# Patient Record
Sex: MALE | Race: ASIAN | NOT HISPANIC OR LATINO | Employment: FULL TIME | URBAN - METROPOLITAN AREA
[De-identification: names, ages, dates, MRNs, and addresses within clinical notes are randomized per-mention and may not be internally consistent; named-entity substitution may affect disease eponyms.]

---

## 2017-01-16 RX ORDER — BETAMETHASONE DIPROPIONATE 0.5 MG/G
CREAM TOPICAL
Qty: 1 TUBE | Refills: 0 | Status: SHIPPED | OUTPATIENT
Start: 2017-01-16 | End: 2017-10-19 | Stop reason: SDUPTHER

## 2017-01-16 NOTE — TELEPHONE ENCOUNTER
Was the patient seen in the last year in this department? Yes  Seen 11/21/16    Does patient have an active prescription for medications requested? No     Received Request Via: Pharmacy

## 2017-01-30 DIAGNOSIS — G47.09 OTHER INSOMNIA: ICD-10-CM

## 2017-01-31 RX ORDER — ZOLPIDEM TARTRATE 10 MG/1
10 TABLET ORAL NIGHTLY PRN
Qty: 30 EACH | Refills: 0 | Status: SHIPPED
Start: 2017-01-31 | End: 2017-03-27 | Stop reason: SDUPTHER

## 2017-01-31 NOTE — TELEPHONE ENCOUNTER
From: Don Olivas Jr.  To: Rebekah Calix M.D.  Sent: 1/30/2017 8:54 PM PST  Subject: Medication Renewal Request    Original authorizing provider: CLYDE Dow Jr. would like a refill of the following medications:  zolpidem (AMBIEN) 10 MG Tab [Rebekah Calix M.D.]    Preferred pharmacy: [facility] [id number] [facility], [place] - [address]    Comment:

## 2017-03-27 DIAGNOSIS — G47.09 OTHER INSOMNIA: ICD-10-CM

## 2017-03-27 RX ORDER — ZOLPIDEM TARTRATE 10 MG/1
10 TABLET ORAL NIGHTLY PRN
Qty: 30 EACH | Refills: 0 | Status: SHIPPED
Start: 2017-03-27 | End: 2017-05-09 | Stop reason: SDUPTHER

## 2017-03-27 NOTE — TELEPHONE ENCOUNTER
From: Don Olivas Jr.  To: Rebekah Calix M.D.  Sent: 3/27/2017 1:12 PM PDT  Subject: Medication Renewal Request    Original authorizing provider: CLYDE Dow Jr. would like a refill of the following medications:  zolpidem (AMBIEN) 10 MG Tab [Rebekah Calix M.D.]    Preferred pharmacy: [facility] [id number] [facility], [place] - [address]    Comment:

## 2017-04-03 ENCOUNTER — OFFICE VISIT (OUTPATIENT)
Dept: ENDOCRINOLOGY | Facility: MEDICAL CENTER | Age: 52
End: 2017-04-03
Payer: COMMERCIAL

## 2017-04-03 VITALS
BODY MASS INDEX: 27.65 KG/M2 | OXYGEN SATURATION: 96 % | SYSTOLIC BLOOD PRESSURE: 140 MMHG | HEIGHT: 67 IN | WEIGHT: 176.2 LBS | HEART RATE: 96 BPM | DIASTOLIC BLOOD PRESSURE: 90 MMHG

## 2017-04-03 DIAGNOSIS — C73 PAPILLARY THYROID CARCINOMA (HCC): ICD-10-CM

## 2017-04-03 DIAGNOSIS — E89.0 POSTSURGICAL HYPOTHYROIDISM: ICD-10-CM

## 2017-04-03 PROCEDURE — 99214 OFFICE O/P EST MOD 30 MIN: CPT | Performed by: INTERNAL MEDICINE

## 2017-04-03 NOTE — MR AVS SNAPSHOT
"Don Olivas Jr.   4/3/2017 12:20 PM   Office Visit   MRN: [id number]    Department:  Endocrinology [facility]   Dept Phone:  [phone]    Description:  Male : 1965   Provider:  Henry Hernández M.D.           Reason for Visit     Follow-Up Papillary Thyroid Carcinoma      Allergies as of 4/3/2017     Allergen Noted Reactions    Lisinopril 2015       Cough      You were diagnosed with     Papillary thyroid carcinoma (CMS-HCC)   [120477]         Vital Signs     Blood Pressure Pulse Height Weight Body Mass Index Oxygen Saturation    140/90 mmHg 96 1.689 m (5' 6.5\") 79.924 kg (176 lb 3.2 oz) 28.02 kg/m2 96%    Smoking Status                   Current Every Day Smoker           Basic Information     Date Of Birth Sex Race Ethnicity Preferred Language    1965 Male  Non- English      Your appointments     May 22, 2017 10:20 AM   Established Patient with Rebekah Calix M.D.   [facility])    [address]  [address]  [facility] [address] [phone]           You will be receiving a confirmation call a few days before your appointment from our automated call confirmation system.            Oct 02, 2017 10:40 AM   Established Patient with Henry Hernández M.D.   [facility]    [address], [address]  [facility] [address] [phone]           You will be receiving a confirmation call a few days before your appointment from our automated call confirmation system.              Problem List              ICD-10-CM Priority Class Noted - Resolved    Hypothyroidism E03.9   Unknown - Present    Insomnia G47.00   Unknown - Present    Papillary carcinoma of thyroid (CMS-HCC) C73   Unknown - Present    Postsurgical hypothyroidism E89.0   10/24/2012 - Present    ASTHMA    2013 - Present    HTN (hypertension) I10   Unknown - Present    Parotid gland enlargement K11.1   2015 - Present    Essential " hypertension I10   7/13/2015 - Present    Vitamin D deficiency E55.9   10/29/2015 - Present      Health Maintenance        Date Due Completion Dates    IMM DTaP/Tdap/Td Vaccine (1 - Tdap) 11/14/1983 ---    IMM PNEUMOCOCCAL 19-64 (ADULT) MEDIUM RISK SERIES (1 of 1 - PPSV23) 11/14/1983 ---    COLONOSCOPY 11/14/2014 ---            Current Immunizations     Influenza TIV (IM) 10/3/2012, 9/25/2012    Influenza Vaccine Quad Inj (Pf) 11/21/2016, 10/22/2014    Influenza Vaccine Quad Inj (Preserved) 10/29/2015    Tuberculin Skin Test 1/4/2013  4:05 PM, 1/25/2012 12:34 PM, 1/18/2012  4:20 PM      Below and/or attached are the medications your provider expects you to take. Review all of your home medications and newly ordered medications with your provider and/or pharmacist. Follow medication instructions as directed by your provider and/or pharmacist. Please keep your medication list with you and share with your provider. Update the information when medications are discontinued, doses are changed, or new medications (including over-the-counter products) are added; and carry medication information at all times in the event of emergency situations     Allergies:  LISINOPRIL - (reactions not documented)               Medications  Valid as of: April 03, 2017 - 12:22 PM    Generic Name Brand Name Tablet Size Instructions for use    Albuterol Sulfate (Aero Soln) albuterol 108 (90 BASE) MCG/ACT Inhale 2 Puffs by mouth every 6 hours as needed for Shortness of Breath.        Betamethasone Dipropionate (Cream) DIPROLENE 0.05 % Apply to affected areas twice daily.        Betamethasone Dipropionate Aug (Cream) DIPROLENE-AF 0.05 % APPLY TO AFFECTED AREAS TWICE DAILY.        Calcium Carb-Cholecalciferol   Take  by mouth 2 Times a Day.        Calcium Carbonate Antacid   Take 1,000 mg by mouth 2 Times a Day.        Levothyroxine Sodium (Tab) SYNTHROID 100 MCG Take 1 Tab by mouth every day.        Lisinopril (Tab) PRINIVIL 10 MG Take 1 Tab by  mouth every day.        Losartan Potassium (Tab) COZAAR 25 MG Take 1 Tab by mouth every day.        Meloxicam (Tab) MOBIC 15 MG TAKE 1 TABLET BY MOUTH EVERY DAY        Zolpidem Tartrate (Tab) AMBIEN 10 MG Take 1 Tab by mouth at bedtime as needed for Sleep.        .                 Medicines prescribed today were sent to:     [facility] [id number] - [place], [place] - [address]    [address] [place] [address]    Phone: [phone] Fax: [phone]    Open 24 Hours?: No      Medication refill instructions:       If your prescription bottle indicates you have medication refills left, it is not necessary to call your provider’s office. Please contact your pharmacy and they will refill your medication.    If your prescription bottle indicates you do not have any refills left, you may request refills at any time through one of the following ways: The online [employer] system (except Urgent Care), by calling your provider’s office, or by asking your pharmacy to contact your provider’s office with a refill request. Medication refills are processed only during regular business hours and may not be available until the next business day. Your provider may request additional information or to have a follow-up visit with you prior to refilling your medication.   *Please Note: Medication refills are assigned a new Rx number when refilled electronically. Your pharmacy may indicate that no refills were authorized even though a new prescription for the same medication is available at the pharmacy. Please request the medicine by name with the pharmacy before contacting your provider for a refill.        Your To Do List     Future Labs/Procedures Complete By Expires    ANTITHYROGLOBULIN AB  As directed 4/3/2018    ANTITHYROGLOBULIN AB  As directed 4/3/2018    FREE THYROXINE  As directed 4/3/2018    FREE THYROXINE  As directed 4/3/2018    THYROGLOBULIN, QT  As directed 4/3/2018    THYROGLOBULIN, QT  As directed 4/3/2018       TSH  As directed 4/3/2018    TSH  As directed 4/3/2018    US-SOFT TISSUES OF HEAD - NECK  As directed 4/3/2018         MyChart Access Code: Activation code not generated  Current MyChart Status: Active          Quit Tobacco Information     Do you want to quit using tobacco?    Quitting tobacco decreases risks of cancer, heart and lung disease, increases life expectancy, improves sense of taste and smell, and increases spending money, among other benefits.    If you are thinking about quitting, we can help.  • [facility] Quit Tobacco Program: [phone]  o Program occurs weekly for four weeks and includes pharmacist consultation on products to support quitting smoking or chewing tobacco. A provider referral is needed for pharmacist consultation.  • Tobacco Users Help Hotline: 3-939-QUITNOW (669-1270) or https://[place].quitlogix.org/  o Free, confidential telephone and online coaching for [place] residents. Sessions are designed on a schedule that is convenient for you. Eligible clients receive free nicotine replacement therapy.  • Nationally: www.smokefree.gov  o Information and professional assistance to support both immediate and long-term needs as you become, and remain, a non-smoker. Smokefree.gov allows you to choose the help that best fits your needs.

## 2017-04-03 NOTE — PROGRESS NOTES
Endocrinology Clinic Progress Note  PCP: Rebekah Calix M.D.    CC: Papillary thyroid carcinoma    HPI:  Don Olivas Jr. is a 51 y.o. old patient who comes in today for routine follow up. Patient is new to me today, previously saw Dr. Peguero. In 2007 he underwent total thyroidectomy, pathology report reviewed. He had 2.0 cm focus of papillary thyroid cancer in right thyroid lobe, no extrathyroidal extension, surgical margin uninvolved. He also had a 0.3 cm focus of papillary thyroid carcinoma in the left thyroid lobe, without extrathyroidal extension. He did not receive radioactive iodine remnant ablation. Thyroid bed ultrasound in May 2015 showed 22 mm mass in the right thyroid bed with internal vascular flow. It also showed possible remnant thyroid tissue measuring 12 mm in the left thyroid lobe. He also had 22 mm cystic mass in the left parotid gland. FNA of left parotid mass in July 2015 was benign. He denies noticing any neck lumps or masses. He is currently on levothyroxine 100 µg daily. He reports compliance with medications. No family history of thyroid cancer.    ROS:  Constitutional: No unintentional weight loss  Endo: Denies excessive thirst or frequent urination    Past Medical History:  Patient Active Problem List    Diagnosis Date Noted   • Vitamin D deficiency 10/29/2015   • Essential hypertension 07/13/2015   • Parotid gland enlargement 06/24/2015   • HTN (hypertension)    • ASTHMA 01/09/2013   • Postsurgical hypothyroidism 10/24/2012   • Hypothyroidism    • Insomnia    • Papillary carcinoma of thyroid (CMS-HCC)        Medications:    Current outpatient prescriptions:   •  zolpidem (AMBIEN) 10 MG Tab, Take 1 Tab by mouth at bedtime as needed for Sleep., Disp: 30 Each, Rfl: 0  •  levothyroxine (SYNTHROID) 100 MCG Tab, Take 1 Tab by mouth every day., Disp: 30 Tab, Rfl: 5  •  losartan (COZAAR) 25 MG Tab, Take 1 Tab by mouth every day., Disp: 30 Tab, Rfl: 5  •  meloxicam (MOBIC) 15 MG  tablet, TAKE 1 TABLET BY MOUTH EVERY DAY, Disp: 30 Tab, Rfl: 1  •  Calcium Carbonate Antacid (TUMS PO), Take 1,000 mg by mouth 2 Times a Day., Disp: , Rfl:   •  Calcium Carb-Cholecalciferol (CALCIUM 600 + D PO), Take  by mouth 2 Times a Day., Disp: , Rfl:   •  Aug Betamethasone Dipropionate (DIPROLENE-AF) 0.05 % Cream, APPLY TO AFFECTED AREAS TWICE DAILY., Disp: 1 Tube, Rfl: 0  •  betamethasone dipropionate (DIPROLENE) 0.05 % Cream, Apply to affected areas twice daily., Disp: 30 g, Rfl: 1  •  lisinopril (PRINIVIL) 10 MG TABS, Take 1 Tab by mouth every day., Disp: 30 Tab, Rfl: 5  •  albuterol (VENTOLIN OR PROVENTIL) 108 (90 BASE) MCG/ACT AERS, Inhale 2 Puffs by mouth every 6 hours as needed for Shortness of Breath., Disp: 8.5 g, Rfl: 3    Labs:   [facility]  [facility] [id number][facility] as of 4/3/2017 12:29   Ref. Range 11/19/2016 09:34   TSH Latest Ref Range: 0.300-3.700 uIU/mL 0.300   Free T-4 Latest Ref Range: 0.53-1.43 ng/dL 0.97   T3,Free Latest Ref Range: 2.40-4.20 pg/mL 3.47   Thyroglobulin by LC-MC/MS-S/P Latest Ref Range: 1.3-31.8 ng/mL Not Applicable   Thyroglobulin Latest Ref Range: 1.3-31.8 ng/mL 2.0   Anti-Thyroglobulin Latest Ref Range: 0.0-4.0 IU/mL <0.9     Pathology report from 2007:  FINAL DIAGNOSIS:    A.  Right thyroid lobe:  Carcinoma having the following characteristics:    -Histologic type:       Papillary carcinoma.    -Extent of invasion:  The tumor measures 2 cm in greatest dimension and there is no evidence  of extra-thyroidal extension.    -Margin status:       The surgical margin is uninvolved by carcinoma.  B.  Left thyroid lobe:  Carcinoma having the following characteristics:    -Histologic type:       Papillary carcinoma.    -Extent of invasion:  The tumor measures 0.3 cm in greatest dimension and there is no evidence  of extra-thyroidal extension.    -Margin status:       The surgical margin is uninvolved by carcinoma.    5/4/2015 12:04 PM    HISTORY/REASON FOR EXAM:  " Left parotid gland mass, post 2007 thyroidectomy for papillary carcinoma    TECHNIQUE/EXAM DESCRIPTION:  Ultrasound of the soft tissues of the head and neck.    COMPARISON:  5/22/07    FINDINGS:  The thyroid gland is surgically absent by report.    There is intermediate echogenicity material in the right thyroid bed measuring 12 x 22 x 13 mm. In the center of this there is an indistinct hypoechoic region measuring 8 x 8 mm. There is internal vascular flow. No calcification    In the left thyroid bed there is what also appears to represent some thyroid gland measuring 12 x 7 x 6 mm    In the region of the left parotid gland/tail there is a cystic mass measuring 22 x 20 x 11 mm. Some internal echoes are seen and calcification along the margins is not excluded. The 2007 comparison demonstrated a 15 x 13 x 12 mm similar cystic mass in   this region         Impression        Solid tissue in the thyroid bed is suspicious for residual thyroid gland. Recommend clinical correlation and if indicated iodine-123 study could be performed    Suspicious for a solid 8mm nodule within the right thyroid lobe    Left parotid tail mass has enlarged measuring 22 from 15 mm previously. Reportedly this was previously biopsied. Recommend clinical correlation.         Physical Examination:  Vital signs: /90 mmHg  Pulse 96  Ht 1.689 m (5' 6.5\")  Wt 79.924 kg (176 lb 3.2 oz)  BMI 28.02 kg/m2  SpO2 96%  General: No apparent distress, cooperative  Eyes: No scleral icterus, no discharge, normal eyelids  Neck: No abnormal masses on palpation  Resp: Normal effort, clear to auscultation bilaterally  CVS: Regular rate and rhythm, S1 S2 normal, no murmur  Extremities: No lower extremity edema  Musculoskeletal: Normal digits and nails  Skin: No rash on visible skin  Psych: Alert and oriented, normal mood and affect    Assessment and Plan:    1. Papillary thyroid carcinoma (CMS-HCC)  · Status post total thyroidectomy in 2007. Pathology " showed  2.0 cm focus of papillary thyroid cancer in right thyroid lobe, no extrathyroidal extension, surgical margin uninvolved. He also had a 0.3 cm focus of papillary thyroid carcinoma in the left thyroid lobe, without extrathyroidal extension.   · He did not receive radioactive iodine remnant ablation.   · Thyroid bed ultrasound in May 2015 showed 22 mm mass in the right thyroid bed with internal vascular flow. It also showed possible remnant thyroid tissue measuring 12 mm in the left thyroid lobe. He also had 22 mm cystic mass in the left parotid gland. FNA of left parotid mass in July 2015 was benign.  · Thyroglobulin level in November 2016 was 2.0, compared to 3.1 in 2015, thyroglobulin antibody is negative  · We discussed that at this time he meets criteria for indeterminate response versus structural incomplete response  · We will repeat thyroid bed ultrasound now  · We will repeat thyroglobulin panel now and then again in 6 months  - FREE THYROXINE; Future  - TSH; Future  - THYROGLOBULIN, QT; Future  - ANTITHYROGLOBULIN AB; Future  - US-SOFT TISSUES OF HEAD - NECK; Future  - TSH; Future  - FREE THYROXINE; Future  - THYROGLOBULIN, QT; Future  - ANTITHYROGLOBULIN AB; Future    2. Postsurgical hypothyroidism  · Goal TSH at this time in the range of 0.1-0.5  · Most recent TSH 0.3  · Continue levothyroxine 100 µg daily  · Repeat labs for TSH and free T4 now    Return in about 6 months (around 10/3/2017).    Thank you for allowing me to participate in the care of this patient.    Henry Hernández M.D.    CC:   Rebekah Calix M.D.    This note was created using voice recognition software (Dragon). The accuracy of the dictation is limited by the abilities of the software. I have reviewed the note prior to signing, however some errors in grammar and context are still possible. If you have any questions related to this note please do not hesitate to contact our office.

## 2017-04-11 ENCOUNTER — HOSPITAL ENCOUNTER (OUTPATIENT)
Dept: LAB | Facility: MEDICAL CENTER | Age: 52
End: 2017-04-11
Attending: INTERNAL MEDICINE
Payer: COMMERCIAL

## 2017-04-11 DIAGNOSIS — C73 PAPILLARY THYROID CARCINOMA (HCC): ICD-10-CM

## 2017-04-11 LAB
T4 FREE SERPL-MCNC: 1.19 NG/DL (ref 0.53–1.43)
TSH SERPL DL<=0.005 MIU/L-ACNC: 0.08 UIU/ML (ref 0.3–3.7)

## 2017-04-11 PROCEDURE — 36415 COLL VENOUS BLD VENIPUNCTURE: CPT

## 2017-04-11 PROCEDURE — 84443 ASSAY THYROID STIM HORMONE: CPT

## 2017-04-11 PROCEDURE — 86800 THYROGLOBULIN ANTIBODY: CPT

## 2017-04-11 PROCEDURE — 84432 ASSAY OF THYROGLOBULIN: CPT | Mod: 91

## 2017-04-11 PROCEDURE — 84439 ASSAY OF FREE THYROXINE: CPT

## 2017-04-13 LAB
THYROGLOB AB SERPL-ACNC: <0.9 IU/ML (ref 0–4)
THYROGLOB SERPL-MCNC: 2 NG/ML (ref 1.3–31.8)
THYROGLOB SERPL-MCNC: NORMAL NG/ML (ref 1.3–31.8)

## 2017-04-18 ENCOUNTER — HOSPITAL ENCOUNTER (OUTPATIENT)
Dept: RADIOLOGY | Facility: MEDICAL CENTER | Age: 52
End: 2017-04-18
Attending: INTERNAL MEDICINE
Payer: COMMERCIAL

## 2017-04-18 DIAGNOSIS — E89.0 POSTSURGICAL HYPOTHYROIDISM: ICD-10-CM

## 2017-04-18 DIAGNOSIS — C73 PAPILLARY THYROID CARCINOMA (HCC): ICD-10-CM

## 2017-04-18 DIAGNOSIS — I10 ESSENTIAL HYPERTENSION: ICD-10-CM

## 2017-04-18 PROCEDURE — 76536 US EXAM OF HEAD AND NECK: CPT

## 2017-04-18 RX ORDER — LEVOTHYROXINE SODIUM 0.1 MG/1
100 TABLET ORAL
Qty: 30 TAB | Refills: 0 | OUTPATIENT
Start: 2017-04-18

## 2017-04-18 RX ORDER — LEVOTHYROXINE SODIUM 0.1 MG/1
100 TABLET ORAL
Qty: 30 TAB | Refills: 5 | Status: SHIPPED | OUTPATIENT
Start: 2017-04-18 | End: 2017-07-24 | Stop reason: SDUPTHER

## 2017-04-18 RX ORDER — LOSARTAN POTASSIUM 25 MG/1
25 TABLET ORAL
Qty: 30 TAB | Refills: 5 | Status: SHIPPED | OUTPATIENT
Start: 2017-04-18 | End: 2017-07-24 | Stop reason: SDUPTHER

## 2017-04-18 RX ORDER — LOSARTAN POTASSIUM 25 MG/1
25 TABLET ORAL
Qty: 30 TAB | Refills: 0 | OUTPATIENT
Start: 2017-04-18

## 2017-04-21 ENCOUNTER — TELEPHONE (OUTPATIENT)
Dept: ENDOCRINOLOGY | Facility: MEDICAL CENTER | Age: 52
End: 2017-04-21

## 2017-04-21 NOTE — TELEPHONE ENCOUNTER
Called Pt and notified  Thyroid bed ultrasound shows a questionable area measuring 1.2 cm. Please schedule an appointment in the clinic to follow-up on this finding, within the next month.    Thank you  Lilibeth

## 2017-05-08 DIAGNOSIS — G47.09 OTHER INSOMNIA: ICD-10-CM

## 2017-05-09 RX ORDER — ZOLPIDEM TARTRATE 10 MG/1
10 TABLET ORAL NIGHTLY PRN
Qty: 30 EACH | Refills: 0 | Status: CANCELLED
Start: 2017-05-09

## 2017-05-09 RX ORDER — ZOLPIDEM TARTRATE 10 MG/1
10 TABLET ORAL NIGHTLY PRN
Qty: 30 EACH | Refills: 0 | Status: SHIPPED | OUTPATIENT
Start: 2017-05-09 | End: 2017-07-05 | Stop reason: SDUPTHER

## 2017-05-09 NOTE — TELEPHONE ENCOUNTER
Was the patient seen in the last year in this department? Yes    Last appt 11/21/17, pending appt 5/22/17    Does patient have an active prescription for medications requested? No   Last filled 3/27/17    Received Request Via: Patient

## 2017-05-09 NOTE — TELEPHONE ENCOUNTER
From: Don Olivas Jr.  To: Sussy Tan M.D.  Sent: 5/8/2017 8:35 PM PDT  Subject: Medication Renewal Request    Original authorizing provider: CLYDE Alejo Jr. would like a refill of the following medications:  zolpidem (AMBIEN) 10 MG Tab [Sussy Tan M.D.]    Preferred pharmacy: [facility] [place] - [address]    Comment:

## 2017-05-11 NOTE — TELEPHONE ENCOUNTER
I have refilled this on 5/9/17. Please check with pharmacy if they got the fax if not okay to call in prescription.

## 2017-05-12 DIAGNOSIS — M54.5 ACUTE LOW BACK PAIN, UNSPECIFIED BACK PAIN LATERALITY, WITH SCIATICA PRESENCE UNSPECIFIED: ICD-10-CM

## 2017-05-12 RX ORDER — MELOXICAM 15 MG/1
15 TABLET ORAL
Qty: 30 TAB | Refills: 0 | OUTPATIENT
Start: 2017-05-12

## 2017-05-15 ENCOUNTER — OFFICE VISIT (OUTPATIENT)
Dept: ENDOCRINOLOGY | Facility: MEDICAL CENTER | Age: 52
End: 2017-05-15
Payer: COMMERCIAL

## 2017-05-15 VITALS
HEIGHT: 66 IN | OXYGEN SATURATION: 98 % | DIASTOLIC BLOOD PRESSURE: 78 MMHG | WEIGHT: 172.4 LBS | BODY MASS INDEX: 27.71 KG/M2 | SYSTOLIC BLOOD PRESSURE: 128 MMHG | HEART RATE: 85 BPM

## 2017-05-15 DIAGNOSIS — C73 PAPILLARY THYROID CARCINOMA (HCC): ICD-10-CM

## 2017-05-15 DIAGNOSIS — E89.0 POSTSURGICAL HYPOTHYROIDISM: ICD-10-CM

## 2017-05-15 PROCEDURE — 99213 OFFICE O/P EST LOW 20 MIN: CPT | Performed by: INTERNAL MEDICINE

## 2017-05-15 NOTE — PROGRESS NOTES
"Endocrinology Clinic Progress Note  PCP: Rebekah Calix M.D.    CC: Here to discuss thyroid ultrasound report    HPI:  Don Olivas Jr. is a 51 y.o. old patient who comes in today for follow up. Thyroid bed ultrasound in April 2017 showed 1.2 cm residual left thyroid tissue and 1.6 cm residual right thyroid. He underwent total thyroidectomy in 2007, details below. His most recent thyroglobulin level is 2.0, with undetectable thyroglobulin antibody level.    Copy of HPI from last clinic visit note: \"In 2007 he underwent total thyroidectomy, pathology report reviewed. He had 2.0 cm focus of papillary thyroid cancer in right thyroid lobe, no extrathyroidal extension, surgical margin uninvolved. He also had a 0.3 cm focus of papillary thyroid carcinoma in the left thyroid lobe, without extrathyroidal extension. He did not receive radioactive iodine remnant ablation. Thyroid bed ultrasound in May 2015 showed 22 mm mass in the right thyroid bed with internal vascular flow. It also showed possible remnant thyroid tissue measuring 12 mm in the left thyroid lobe. He also had 22 mm cystic mass in the left parotid gland. FNA of left parotid mass in July 2015 was benign. He denies noticing any neck lumps or masses. He is currently on levothyroxine 100 µg daily. He reports compliance with medications. No family history of thyroid cancer.\"    ROS:  Constitutional: No unintentional weight loss    Past Medical History:  Patient Active Problem List    Diagnosis Date Noted   • Vitamin D deficiency 10/29/2015   • Essential hypertension 07/13/2015   • Parotid gland enlargement 06/24/2015   • HTN (hypertension)    • ASTHMA 01/09/2013   • Postsurgical hypothyroidism 10/24/2012   • Hypothyroidism    • Insomnia    • Papillary carcinoma of thyroid (CMS-HCC)        Medications:    Current outpatient prescriptions:   •  meloxicam (MOBIC) 15 MG tablet, TAKE 1 TABLET BY MOUTH EVERY DAY, Disp: 30 Tab, Rfl: 2  •  zolpidem (AMBIEN) 10 MG " Tab, Take 1 Tab by mouth at bedtime as needed for Sleep., Disp: 30 Each, Rfl: 0  •  levothyroxine (SYNTHROID) 100 MCG Tab, Take 1 Tab by mouth every day., Disp: 30 Tab, Rfl: 5  •  losartan (COZAAR) 25 MG Tab, Take 1 Tab by mouth every day., Disp: 30 Tab, Rfl: 5  •  Aug Betamethasone Dipropionate (DIPROLENE-AF) 0.05 % Cream, APPLY TO AFFECTED AREAS TWICE DAILY., Disp: 1 Tube, Rfl: 0  •  betamethasone dipropionate (DIPROLENE) 0.05 % Cream, Apply to affected areas twice daily., Disp: 30 g, Rfl: 1  •  Calcium Carbonate Antacid (TUMS PO), Take 1,000 mg by mouth 2 Times a Day., Disp: , Rfl:   •  Calcium Carb-Cholecalciferol (CALCIUM 600 + D PO), Take  by mouth 2 Times a Day., Disp: , Rfl:   •  lisinopril (PRINIVIL) 10 MG TABS, Take 1 Tab by mouth every day., Disp: 30 Tab, Rfl: 5  •  albuterol (VENTOLIN OR PROVENTIL) 108 (90 BASE) MCG/ACT AERS, Inhale 2 Puffs by mouth every 6 hours as needed for Shortness of Breath., Disp: 8.5 g, Rfl: 3    Labs:  Results for [facility], ALBA EVI IRBY (MRN [id number]) as of 5/15/2017 10:47   Ref. Range 2015 07:54 2015 07:47 2015 08:13 2016 06:09 2016 09:34 2017 15:24   TSH Latest Ref Range: 0.300-3.700 uIU/mL 1.230 0.130 (L)   0.300 0.080 (L)   Free T-4 Latest Ref Range: 0.53-1.43 ng/dL 0.86 0.84   0.97 1.19   T3 Latest Ref Range: 60.0-181.0 ng/dL 97.5 91.5       T3,Free Latest Ref Range: 2.40-4.20 pg/mL     3.47    Thyroglobulin by LC-MC/MS-S/P Latest Ref Range: 1.3-31.8 ng/mL Not Applicable    Not Applicable Not Applicable   Thyroglobulin Latest Ref Range: 1.3-31.8 ng/mL 3.1    2.0 2.0   Anti-Thyroglobulin Latest Ref Range: 0.0-4.0 IU/mL <0.9    <0.9 <0.9       Imagin2017 3:50 PM    HISTORY/REASON FOR EXAM:  Papillary carcinoma thyroid gland      TECHNIQUE/EXAM DESCRIPTION:  Ultrasound of the soft tissues of the head and neck.    COMPARISON:  2015    FINDINGS:  The thyroid gland is heterogeneous.  Vascularity is normal.    The right lobe of the  "thyroid gland measures 1.10 cm x 1.66 cm x 1.13 cm.  The residual left lobe of the thyroid gland measures 1.04 cm x 1.26 cm x 0.82 cm.  The isthmus measures 0.22 cm.    No abnormal appearing lymph nodes are identified in the neck bilaterally.         Impression        1.  No focal nodules or masses are identified in the remaining thyroid gland on the current exam.    2.  Residual or recurrent tissue again appears to be present in the left thyroid bed measuring 1.2 x 1.0 x 0.8 cm. Prior exam was 1.2 x 0.7 x 0.6 cm.     Cytology from 2007:  FINAL DIAGNOSIS:    A.  Right thyroid lobe:  Carcinoma having the following characteristics:    -Histologic type:       Papillary carcinoma.    -Extent of invasion:  The tumor measures 2 cm in greatest dimension and there is no evidence  of extra-thyroidal extension.    -Margin status:       The surgical margin is uninvolved by carcinoma.  B.  Left thyroid lobe:  Carcinoma having the following characteristics:    -Histologic type:       Papillary carcinoma.    -Extent of invasion:  The tumor measures 0.3 cm in greatest dimension and there is no evidence  of extra-thyroidal extension.    -Margin status:       The surgical margin is uninvolved by carcinoma.    COMMENT:  The pathologic AJCC stage is pT1; pNX; pMX.      Physical Examination:  Vital signs: /78 mmHg  Pulse 85  Ht 1.676 m (5' 6\")  Wt 78.2 kg (172 lb 6.4 oz)  BMI 27.84 kg/m2  SpO2 98%  General: No apparent distress, cooperative  Eyes: No scleral icterus, no discharge  Resp: Normal effort, clear to auscultation bilaterally  CVS: Regular rate and rhythm, S1 S2 normal, no murmur  Extremities: No lower extremity edema  Psych: Alert and oriented, normal mood and affect    Assessment and Plan:    1. Papillary thyroid carcinoma (CMS-HCC)  · Status post total thyroidectomy in 2007. Pathology showed  2.0 cm focus of papillary thyroid cancer in right thyroid lobe, no extrathyroidal extension, surgical margin uninvolved. " He also had a 0.3 cm focus of papillary thyroid carcinoma in the left thyroid lobe, without extrathyroidal extension.    · He did not receive radioactive iodine remnant ablation  · Thyroid bed ultrasound in May 2015 showed 22 mm mass in the right thyroid bed with internal vascular flow. It also showed possible remnant thyroid tissue measuring 12 mm in the left thyroid lobe. He also had 22 mm cystic mass in the left parotid gland. FNA of left parotid mass in July 2015 was benign.  · Thyroglobulin level in April 2017 is 2.0, compared to 3.1 in 2015, thyroglobulin antibody is negative  · Thyroid bed ultrasound in April 2017 showed 1.2 cm residual left thyroid tissue and 1.6 cm residual right thyroid  · We will refer to Dr. Angel Lindsey for completion thyroidectomy  · Following repeat surgery we will proceed with radioactive iodine remnant ablation    2. Postsurgical hypothyroidism  · Goal TSH at this time in the range of <0.1  · Most recent TSH 0.08  · Continue levothyroxine 100 µg daily    Return in about 3 months (around 8/15/2017).    Thank you for allowing me to participate in the care of this patient.    Henry Hernández M.D.    CC:   Rebekah Calix M.D.  Angel Lindsey M.D.    This note was created using voice recognition software (Dragon). The accuracy of the dictation is limited by the abilities of the software. I have reviewed the note prior to signing, however some errors in grammar and context are still possible. If you have any questions related to this note please do not hesitate to contact our office.

## 2017-05-15 NOTE — MR AVS SNAPSHOT
"Don Olivas Jr.   5/15/2017 10:40 AM   Office Visit   MRN: [id number]    Department:  Endocrinology [facility]   Dept Phone:  [phone]    Description:  Male : 1965   Provider:  Henry Hernández M.D.           Reason for Visit     Follow-Up Papillary Thyroid Carcinoma      Allergies as of 5/15/2017     Allergen Noted Reactions    Lisinopril 2015       Cough      You were diagnosed with     Papillary thyroid carcinoma (CMS-HCC)   [528432]       Postsurgical hypothyroidism   [244.0.ICD-9-CM]         Vital Signs     Blood Pressure Pulse Height Weight Body Mass Index Oxygen Saturation    128/78 mmHg 85 1.676 m (5' 6\") 78.2 kg (172 lb 6.4 oz) 27.84 kg/m2 98%    Smoking Status                   Current Every Day Smoker           Basic Information     Date Of Birth Sex Race Ethnicity Preferred Language    1965 Male  Non- English      Your appointments     May 22, 2017 10:20 AM   Established Patient with Rebekah Calix M.D.   [facility] [employer] [facility] (--)    [address]  [address]  [place] [id number] [phone]           You will be receiving a confirmation call a few days before your appointment from our automated call confirmation system.            Aug 15, 2017  7:20 AM   Established Patient with Henry Hernández M.D.   [facility] ([facility])    [address], [address]  [place] [address] [phone]           You will be receiving a confirmation call a few days before your appointment from our automated call confirmation system.            Oct 02, 2017 10:40 AM   Established Patient with Henry Hernández M.D.   [facility] ([facility])    [address], [address]  [place] [address] [phone]           You will be receiving a confirmation call a few days before your appointment from our automated call confirmation system.              Problem List              ICD-10-CM Priority " Class Noted - Resolved    Hypothyroidism E03.9   Unknown - Present    Insomnia G47.00   Unknown - Present    Papillary carcinoma of thyroid (CMS-HCC) C73   Unknown - Present    Postsurgical hypothyroidism E89.0   10/24/2012 - Present    ASTHMA    1/9/2013 - Present    HTN (hypertension) I10   Unknown - Present    Parotid gland enlargement K11.1   6/24/2015 - Present    Essential hypertension I10   7/13/2015 - Present    Vitamin D deficiency E55.9   10/29/2015 - Present      Health Maintenance        Date Due Completion Dates    IMM DTaP/Tdap/Td Vaccine (1 - Tdap) 11/14/1983 ---    IMM PNEUMOCOCCAL 19-64 (ADULT) MEDIUM RISK SERIES (1 of 1 - PPSV23) 11/14/1983 ---    COLONOSCOPY 11/14/2014 ---            Current Immunizations     Influenza TIV (IM) 10/3/2012, 9/25/2012    Influenza Vaccine Quad Inj (Pf) 11/21/2016, 10/22/2014    Influenza Vaccine Quad Inj (Preserved) 10/29/2015    Tuberculin Skin Test 1/4/2013  4:05 PM, 1/25/2012 12:34 PM, 1/18/2012  4:20 PM      Below and/or attached are the medications your provider expects you to take. Review all of your home medications and newly ordered medications with your provider and/or pharmacist. Follow medication instructions as directed by your provider and/or pharmacist. Please keep your medication list with you and share with your provider. Update the information when medications are discontinued, doses are changed, or new medications (including over-the-counter products) are added; and carry medication information at all times in the event of emergency situations     Allergies:  LISINOPRIL - (reactions not documented)               Medications  Valid as of: May 15, 2017 - 10:47 AM    Generic Name Brand Name Tablet Size Instructions for use    Albuterol Sulfate (Aero Soln) albuterol 108 (90 BASE) MCG/ACT Inhale 2 Puffs by mouth every 6 hours as needed for Shortness of Breath.        Betamethasone Dipropionate (Cream) DIPROLENE 0.05 % Apply to affected areas twice daily.         Betamethasone Dipropionate Aug (Cream) DIPROLENE-AF 0.05 % APPLY TO AFFECTED AREAS TWICE DAILY.        Calcium Carb-Cholecalciferol   Take  by mouth 2 Times a Day.        Calcium Carbonate Antacid   Take 1,000 mg by mouth 2 Times a Day.        Levothyroxine Sodium (Tab) SYNTHROID 100 MCG Take 1 Tab by mouth every day.        Lisinopril (Tab) PRINIVIL 10 MG Take 1 Tab by mouth every day.        Losartan Potassium (Tab) COZAAR 25 MG Take 1 Tab by mouth every day.        Meloxicam (Tab) MOBIC 15 MG TAKE 1 TABLET BY MOUTH EVERY DAY        Zolpidem Tartrate (Tab) AMBIEN 10 MG Take 1 Tab by mouth at bedtime as needed for Sleep.        .                 Medicines prescribed today were sent to:     [id number] - [place], [place] - [address] [address]    Phone: [phone] Fax: [phone]    Open 24 Hours?: No      Medication refill instructions:       If your prescription bottle indicates you have medication refills left, it is not necessary to call your provider’s office. Please contact your pharmacy and they will refill your medication.    If your prescription bottle indicates you do not have any refills left, you may request refills at any time through one of the following ways: The online [employer] system (except Urgent Care), by calling your provider’s office, or by asking your pharmacy to contact your provider’s office with a refill request. Medication refills are processed only during regular business hours and may not be available until the next business day. Your provider may request additional information or to have a follow-up visit with you prior to refilling your medication.   *Please Note: Medication refills are assigned a new Rx number when refilled electronically. Your pharmacy may indicate that no refills were authorized even though a new prescription for the same medication is available at the pharmacy. Please request the medicine by name with the  pharmacy before contacting your provider for a refill.        Referral     A referral request has been sent to our patient care coordination department. Please allow 3-5 business days for us to process this request and contact you either by phone or mail. If you do not hear from us by the 5th business day, please call us at [phone].           [employer] Access Code: Activation code not generated  Current MyChart Status: Active          Quit Tobacco Information     Do you want to quit using tobacco?    Quitting tobacco decreases risks of cancer, heart and lung disease, increases life expectancy, improves sense of taste and smell, and increases spending money, among other benefits.    If you are thinking about quitting, we can help.  • [employer] Quit Tobacco Program: 778.766.3854  o Program occurs weekly for four weeks and includes pharmacist consultation on products to support quitting smoking or chewing tobacco. A provider referral is needed for pharmacist consultation.  • Tobacco Users Help Hotline: 8-241-QUIT-NOW (231-5375) or https://[place].quitlogix.org/  o Free, confidential telephone and online coaching for [place] residents. Sessions are designed on a schedule that is convenient for you. Eligible clients receive free nicotine replacement therapy.  • Nationally: www.smokefree.gov  o Information and professional assistance to support both immediate and long-term needs as you become, and remain, a non-smoker. Smokefree.gov allows you to choose the help that best fits your needs.

## 2017-05-22 ENCOUNTER — APPOINTMENT (OUTPATIENT)
Dept: MEDICAL GROUP | Facility: PHYSICIAN GROUP | Age: 52
End: 2017-05-22
Payer: COMMERCIAL

## 2017-07-05 RX ORDER — ZOLPIDEM TARTRATE 10 MG/1
10 TABLET ORAL NIGHTLY PRN
Qty: 30 EACH | Refills: 0 | Status: SHIPPED
Start: 2017-07-05 | End: 2017-08-25 | Stop reason: SDUPTHER

## 2017-07-05 NOTE — TELEPHONE ENCOUNTER
From: Don Olivas Jr.  To: Rebekah Calix M.D.  Sent: 7/4/2017 7:53 AM PDT  Subject: Medication Renewal Request    Original authorizing provider: CLYDE Dow Jr. would like a refill of the following medications:  zolpidem (AMBIEN) 10 MG Tab [Rebekah Calix M.D.]    Preferred pharmacy: [id number] [place] - [address]    Comment:

## 2017-07-11 ENCOUNTER — HOSPITAL ENCOUNTER (OUTPATIENT)
Dept: RADIOLOGY | Facility: MEDICAL CENTER | Age: 52
End: 2017-07-11
Attending: SURGERY
Payer: COMMERCIAL

## 2017-07-11 DIAGNOSIS — C73 MALIGNANT NEOPLASM OF THYROID GLAND (HCC): ICD-10-CM

## 2017-07-24 DIAGNOSIS — I10 ESSENTIAL HYPERTENSION: ICD-10-CM

## 2017-07-24 DIAGNOSIS — E89.0 POSTSURGICAL HYPOTHYROIDISM: ICD-10-CM

## 2017-07-24 RX ORDER — LEVOTHYROXINE SODIUM 0.1 MG/1
100 TABLET ORAL
Qty: 30 TAB | Refills: 5 | Status: SHIPPED | OUTPATIENT
Start: 2017-07-24 | End: 2018-02-02 | Stop reason: SDUPTHER

## 2017-07-24 RX ORDER — LOSARTAN POTASSIUM 25 MG/1
25 TABLET ORAL
Qty: 30 TAB | Refills: 5 | Status: SHIPPED | OUTPATIENT
Start: 2017-07-24 | End: 2017-07-25

## 2017-07-24 NOTE — TELEPHONE ENCOUNTER
Pharmacy requested refill of losartan for a Dr. Calix patient. Please check if patient is taking both lisinopril and losartan. He should not be taking both of these at the same time.  Sussy Tan M.D.

## 2017-07-25 RX ORDER — LOSARTAN POTASSIUM 25 MG/1
TABLET ORAL
Qty: 90 TAB | Refills: 3 | Status: SHIPPED | OUTPATIENT
Start: 2017-07-25 | End: 2018-08-07 | Stop reason: SDUPTHER

## 2017-07-25 NOTE — TELEPHONE ENCOUNTER
Phone Number Called: [phone] (home)     Message: LVM to call back.     Left Message for patient to call back: yes

## 2017-07-25 NOTE — TELEPHONE ENCOUNTER
Phone Number Called: [phone] (home)     Message: LVM to call back.     Left Message for patient to call back: yes

## 2017-07-25 NOTE — TELEPHONE ENCOUNTER
Phone Number Called: [phone] (home)     Message: Pt is only taking losartan      Left Message for patient to call back: N\A

## 2017-08-15 ENCOUNTER — APPOINTMENT (OUTPATIENT)
Dept: ENDOCRINOLOGY | Facility: MEDICAL CENTER | Age: 52
End: 2017-08-15
Payer: COMMERCIAL

## 2017-08-25 RX ORDER — ZOLPIDEM TARTRATE 10 MG/1
10 TABLET ORAL NIGHTLY PRN
Qty: 30 EACH | Refills: 0 | Status: SHIPPED
Start: 2017-08-25 | End: 2017-10-09 | Stop reason: SDUPTHER

## 2017-08-25 NOTE — TELEPHONE ENCOUNTER
Was the patient seen in the last year in this department? Yes   Last appt 11/21/16    Does patient have an active prescription for medications requested? No   Last filled 7/5/17     Received Request Via: Patient

## 2017-08-25 NOTE — TELEPHONE ENCOUNTER
Patient should be seen at least once every 6 months to be able to continue getting refills because this is a controlled substance. He also needs follow-up of his medical problems every 6 months. He did not return in May 2017. Patient needs follow-up appointment. No further refill after this without appointment.

## 2017-08-25 NOTE — TELEPHONE ENCOUNTER
From: Don Olivas Jr.  To: Rebekah Calix M.D.  Sent: 8/24/2017 6:30 PM PDT  Subject: Medication Renewal Request    Original authorizing provider: CLYDE Dow Jr. would like a refill of the following medications:  zolpidem (AMBIEN) 10 MG Tab [Rebekah Calix M.D.]    Preferred pharmacy: [id number], [place] - [address]    Comment:

## 2017-08-28 ENCOUNTER — OFFICE VISIT (OUTPATIENT)
Dept: MEDICAL GROUP | Facility: PHYSICIAN GROUP | Age: 52
End: 2017-08-28
Payer: COMMERCIAL

## 2017-08-28 VITALS
WEIGHT: 175 LBS | HEIGHT: 67 IN | TEMPERATURE: 99.2 F | HEART RATE: 82 BPM | DIASTOLIC BLOOD PRESSURE: 80 MMHG | RESPIRATION RATE: 18 BRPM | OXYGEN SATURATION: 97 % | BODY MASS INDEX: 27.47 KG/M2 | SYSTOLIC BLOOD PRESSURE: 132 MMHG

## 2017-08-28 DIAGNOSIS — G47.00 INSOMNIA, UNSPECIFIED TYPE: ICD-10-CM

## 2017-08-28 DIAGNOSIS — C73 PAPILLARY CARCINOMA OF THYROID (HCC): ICD-10-CM

## 2017-08-28 DIAGNOSIS — E89.0 POSTSURGICAL HYPOTHYROIDISM: ICD-10-CM

## 2017-08-28 DIAGNOSIS — Z12.5 SCREENING FOR PROSTATE CANCER: ICD-10-CM

## 2017-08-28 DIAGNOSIS — I10 ESSENTIAL HYPERTENSION: ICD-10-CM

## 2017-08-28 DIAGNOSIS — Z23 NEED FOR TDAP VACCINATION: ICD-10-CM

## 2017-08-28 DIAGNOSIS — E78.5 DYSLIPIDEMIA: ICD-10-CM

## 2017-08-28 PROCEDURE — 90471 IMMUNIZATION ADMIN: CPT | Performed by: FAMILY MEDICINE

## 2017-08-28 PROCEDURE — 99214 OFFICE O/P EST MOD 30 MIN: CPT | Mod: 25 | Performed by: FAMILY MEDICINE

## 2017-08-28 PROCEDURE — 90715 TDAP VACCINE 7 YRS/> IM: CPT | Performed by: FAMILY MEDICINE

## 2017-08-28 ASSESSMENT — PAIN SCALES - GENERAL: PAINLEVEL: NO PAIN

## 2017-08-28 ASSESSMENT — PATIENT HEALTH QUESTIONNAIRE - PHQ9: CLINICAL INTERPRETATION OF PHQ2 SCORE: 0

## 2017-08-28 NOTE — PROGRESS NOTES
"Subjective:      Don Olivas Jr. is a 51 y.o. male who presents with Medication Refill            HPI     Patient returns for follow-up of his medical problems. The last visit was in 11/16.    He has been going to the endocrinologist for papillary carcinoma of the thyroid status post thyroidectomy in 2007. His ultrasound showed residual left thyroid tissue 1.2 cm and right thyroid tissue 1.6 cm. He was referred to the surgeon for completion total thyroidectomy. He was seen and evaluated by Dr. Lindsey was subsequently sent him for FNA. He said he was asked to pay of $800 on the day of the procedure which he couldn't pay. He was also sent to ENT specialist for evaluation of hoarseness of voice. He couldn't remember who he saw. He said he had laryngoscopy that did not show any abnormality. He continues to take thyroid replacement for hypothyroidism.    In terms of his hypertension, this is under control on losartan.    He continues to manage his dyslipidemia with his own efforts. He has not done a follow-up blood work as ordered. His ten-year cardiovascular disease risk was low below 7.5%    He continues to take zolpidem for insomnia with good results.    I referred him for colonoscopy and he has not made the appointment yet.    He needs tdap.    Past medical history, past surgical history, family history reviewed-no changes    Social history reviewed-no changes    Allergies reviewed-no changes    Medications reviewed-no changes.    ROS     Review of systems as per history of present illness, the rest are negative.       Objective:     /80   Pulse 82   Temp 37.3 °C (99.2 °F)   Resp 18   Ht 1.689 m (5' 6.5\")   Wt 79.4 kg (175 lb)   SpO2 97%   BMI 27.82 kg/m²        Physical Exam     Examined alert, awake, oriented, not in distress    Neck-supple, no lymphadenopathy, no thyromegaly  Lungs-clear to auscultation, no rales, no wheezes  Heart-regular rate and rhythm, no murmur  Extremities-no edema, " clubbing, cyanosis            Assessment/Plan:     1. Papillary carcinoma of thyroid (CMS-HCC)  Advised to call the surgeon's office to let them know that she couldn't afford the co-pay for FNA so they can advise on the next step.  - LIPID PROFILE; Future  - COMP METABOLIC PANEL; Future  - CBC WITH DIFFERENTIAL; Future  - PROSTATE SPECIFIC AG SCREENING; Future    2. Postsurgical hypothyroidism  He has residual thyroid tissue. He couldn't afford the co-pay for the FNA. He was advised to call the surgeon's office to inform them about this. Continue thyroid replacement. He has an appointment with endocrinologist in October.  - LIPID PROFILE; Future  - COMP METABOLIC PANEL; Future  - CBC WITH DIFFERENTIAL; Future  - PROSTATE SPECIFIC AG SCREENING; Future    3. Essential hypertension  Controlled on his medication. I rechecked his blood pressure and he was 124/82.  - LIPID PROFILE; Future  - COMP METABOLIC PANEL; Future  - CBC WITH DIFFERENTIAL; Future  - PROSTATE SPECIFIC AG SCREENING; Future    4. Dyslipidemia  He will do blood work as previously ordered. He will continue to manage this with his own efforts.  - LIPID PROFILE; Future  - COMP METABOLIC PANEL; Future  - CBC WITH DIFFERENTIAL; Future  - PROSTATE SPECIFIC AG SCREENING; Future    5. Insomnia, unspecified type  Continue zolpidem.  - LIPID PROFILE; Future  - COMP METABOLIC PANEL; Future  - CBC WITH DIFFERENTIAL; Future  - PROSTATE SPECIFIC AG SCREENING; Future    6. Screening for prostate cancer  We will do a screening PSA.  - LIPID PROFILE; Future  - COMP METABOLIC PANEL; Future  - CBC WITH DIFFERENTIAL; Future  - PROSTATE SPECIFIC AG SCREENING; Future    7. Need for Tdap vaccination  He was given tdap.  - TDAP VACCINE =>8YO IM    Follow-up in 6 months or sooner if needed.      Please note that this dictation was created using voice recognition software. I have worked with consultants from the vendor as well as technical experts from [employer] to optimize  the interface. I have made every reasonable attempt to correct obvious errors, but I expect that there are errors of grammar and possibly content I did not discover before finalizing the note.

## 2017-08-29 ENCOUNTER — HOSPITAL ENCOUNTER (OUTPATIENT)
Dept: LAB | Facility: MEDICAL CENTER | Age: 52
End: 2017-08-29
Attending: FAMILY MEDICINE
Payer: COMMERCIAL

## 2017-08-29 DIAGNOSIS — C73 PAPILLARY CARCINOMA OF THYROID (HCC): ICD-10-CM

## 2017-08-29 DIAGNOSIS — I10 ESSENTIAL HYPERTENSION: ICD-10-CM

## 2017-08-29 DIAGNOSIS — Z12.5 SCREENING FOR PROSTATE CANCER: ICD-10-CM

## 2017-08-29 DIAGNOSIS — E78.5 DYSLIPIDEMIA: ICD-10-CM

## 2017-08-29 DIAGNOSIS — G47.00 INSOMNIA, UNSPECIFIED TYPE: ICD-10-CM

## 2017-08-29 DIAGNOSIS — E89.0 POSTSURGICAL HYPOTHYROIDISM: ICD-10-CM

## 2017-08-29 LAB
ALBUMIN SERPL BCP-MCNC: 3.9 G/DL (ref 3.2–4.9)
ALBUMIN/GLOB SERPL: 1.3 G/DL
ALP SERPL-CCNC: 54 U/L (ref 30–99)
ALT SERPL-CCNC: 42 U/L (ref 2–50)
ANION GAP SERPL CALC-SCNC: 8 MMOL/L (ref 0–11.9)
AST SERPL-CCNC: 32 U/L (ref 12–45)
BASOPHILS # BLD AUTO: 1.3 % (ref 0–1.8)
BASOPHILS # BLD: 0.09 K/UL (ref 0–0.12)
BILIRUB SERPL-MCNC: 0.7 MG/DL (ref 0.1–1.5)
BUN SERPL-MCNC: 15 MG/DL (ref 8–22)
CALCIUM SERPL-MCNC: 8.2 MG/DL (ref 8.5–10.5)
CHLORIDE SERPL-SCNC: 105 MMOL/L (ref 96–112)
CHOLEST SERPL-MCNC: 191 MG/DL (ref 100–199)
CO2 SERPL-SCNC: 26 MMOL/L (ref 20–33)
CREAT SERPL-MCNC: 1.02 MG/DL (ref 0.5–1.4)
EOSINOPHIL # BLD AUTO: 0.31 K/UL (ref 0–0.51)
EOSINOPHIL NFR BLD: 4.5 % (ref 0–6.9)
ERYTHROCYTE [DISTWIDTH] IN BLOOD BY AUTOMATED COUNT: 42.3 FL (ref 35.9–50)
GFR SERPL CREATININE-BSD FRML MDRD: >60 ML/MIN/1.73 M 2
GLOBULIN SER CALC-MCNC: 2.9 G/DL (ref 1.9–3.5)
GLUCOSE SERPL-MCNC: 82 MG/DL (ref 65–99)
HCT VFR BLD AUTO: 47.9 % (ref 42–52)
HDLC SERPL-MCNC: 49 MG/DL
HGB BLD-MCNC: 16.8 G/DL (ref 14–18)
IMM GRANULOCYTES # BLD AUTO: 0.04 K/UL (ref 0–0.11)
IMM GRANULOCYTES NFR BLD AUTO: 0.6 % (ref 0–0.9)
LDLC SERPL CALC-MCNC: 111 MG/DL
LYMPHOCYTES # BLD AUTO: 1.46 K/UL (ref 1–4.8)
LYMPHOCYTES NFR BLD: 21.2 % (ref 22–41)
MCH RBC QN AUTO: 32.2 PG (ref 27–33)
MCHC RBC AUTO-ENTMCNC: 35.1 G/DL (ref 33.7–35.3)
MCV RBC AUTO: 91.8 FL (ref 81.4–97.8)
MONOCYTES # BLD AUTO: 0.68 K/UL (ref 0–0.85)
MONOCYTES NFR BLD AUTO: 9.9 % (ref 0–13.4)
NEUTROPHILS # BLD AUTO: 4.3 K/UL (ref 1.82–7.42)
NEUTROPHILS NFR BLD: 62.5 % (ref 44–72)
NRBC # BLD AUTO: 0 K/UL
NRBC BLD AUTO-RTO: 0 /100 WBC
PLATELET # BLD AUTO: 171 K/UL (ref 164–446)
PMV BLD AUTO: 11 FL (ref 9–12.9)
POTASSIUM SERPL-SCNC: 3.9 MMOL/L (ref 3.6–5.5)
PROT SERPL-MCNC: 6.8 G/DL (ref 6–8.2)
PSA SERPL-MCNC: 0.68 NG/ML (ref 0–4)
RBC # BLD AUTO: 5.22 M/UL (ref 4.7–6.1)
SODIUM SERPL-SCNC: 139 MMOL/L (ref 135–145)
TRIGL SERPL-MCNC: 155 MG/DL (ref 0–149)
WBC # BLD AUTO: 6.9 K/UL (ref 4.8–10.8)

## 2017-08-29 PROCEDURE — 84153 ASSAY OF PSA TOTAL: CPT

## 2017-08-29 PROCEDURE — 36415 COLL VENOUS BLD VENIPUNCTURE: CPT

## 2017-08-29 PROCEDURE — 80061 LIPID PANEL: CPT

## 2017-08-29 PROCEDURE — 80053 COMPREHEN METABOLIC PANEL: CPT

## 2017-08-29 PROCEDURE — 85025 COMPLETE CBC W/AUTO DIFF WBC: CPT

## 2017-08-29 RX ORDER — ATORVASTATIN CALCIUM 20 MG/1
20 TABLET, FILM COATED ORAL EVERY EVENING
Qty: 90 TAB | Refills: 1 | Status: SHIPPED | OUTPATIENT
Start: 2017-08-29 | End: 2017-08-29 | Stop reason: SDUPTHER

## 2017-08-29 RX ORDER — ATORVASTATIN CALCIUM 20 MG/1
20 TABLET, FILM COATED ORAL EVERY EVENING
Qty: 90 TAB | Refills: 1 | Status: SHIPPED | OUTPATIENT
Start: 2017-08-29 | End: 2018-03-03 | Stop reason: SDUPTHER

## 2017-10-08 RX ORDER — ZOLPIDEM TARTRATE 10 MG/1
10 TABLET ORAL NIGHTLY PRN
Qty: 30 EACH | Refills: 0 | Status: CANCELLED | OUTPATIENT
Start: 2017-10-08

## 2017-10-09 RX ORDER — ZOLPIDEM TARTRATE 10 MG/1
10 TABLET ORAL NIGHTLY PRN
Qty: 30 EACH | Refills: 0 | Status: SHIPPED
Start: 2017-10-09 | End: 2017-12-04 | Stop reason: SDUPTHER

## 2017-10-09 NOTE — TELEPHONE ENCOUNTER
Was the patient seen in the last year in this department? Yes     Does patient have an active prescription for medications requested? No     Received Request Via: Patient      in media.

## 2017-10-09 NOTE — TELEPHONE ENCOUNTER
From: Don Olivas Jr.  Sent: 10/8/2017 9:21 PM PDT  Subject: Medication Renewal Request    Don Olivas Jr. would like a refill of the following medications:  zolpidem (AMBIEN) 10 MG Tab [Rebekah Calix M.D.]    Preferred pharmacy: [id number] - [place], [place] - [address]    Comment:

## 2017-11-01 ENCOUNTER — NON-PROVIDER VISIT (OUTPATIENT)
Dept: URGENT CARE | Facility: PHYSICIAN GROUP | Age: 52
End: 2017-11-01
Payer: COMMERCIAL

## 2017-11-01 DIAGNOSIS — Z23 NEED FOR INFLUENZA VACCINATION: ICD-10-CM

## 2017-11-01 PROCEDURE — 90471 IMMUNIZATION ADMIN: CPT | Performed by: FAMILY MEDICINE

## 2017-11-01 PROCEDURE — 90686 IIV4 VACC NO PRSV 0.5 ML IM: CPT | Performed by: FAMILY MEDICINE

## 2017-12-04 DIAGNOSIS — G47.00 INSOMNIA, UNSPECIFIED TYPE: ICD-10-CM

## 2017-12-04 RX ORDER — ZOLPIDEM TARTRATE 10 MG/1
10 TABLET ORAL NIGHTLY PRN
Qty: 30 EACH | Refills: 0 | Status: SHIPPED
Start: 2017-12-04 | End: 2018-01-29 | Stop reason: SDUPTHER

## 2018-01-29 DIAGNOSIS — G47.00 INSOMNIA, UNSPECIFIED TYPE: ICD-10-CM

## 2018-01-29 RX ORDER — ZOLPIDEM TARTRATE 10 MG/1
10 TABLET ORAL NIGHTLY PRN
Qty: 30 EACH | Refills: 0 | Status: SHIPPED
Start: 2018-01-29 | End: 2018-03-26 | Stop reason: SDUPTHER

## 2018-01-29 RX ORDER — ZOLPIDEM TARTRATE 10 MG/1
10 TABLET ORAL NIGHTLY PRN
Qty: 30 EACH | Refills: 0 | Status: CANCELLED | OUTPATIENT
Start: 2018-01-29 | End: 2018-02-28

## 2018-02-02 DIAGNOSIS — E89.0 POSTSURGICAL HYPOTHYROIDISM: ICD-10-CM

## 2018-02-02 DIAGNOSIS — I10 ESSENTIAL HYPERTENSION: ICD-10-CM

## 2018-02-02 RX ORDER — LOSARTAN POTASSIUM 25 MG/1
TABLET ORAL
Qty: 30 TAB | Refills: 5 | Status: SHIPPED | OUTPATIENT
Start: 2018-02-02 | End: 2018-03-05

## 2018-02-02 RX ORDER — LEVOTHYROXINE SODIUM 0.1 MG/1
TABLET ORAL
Qty: 30 TAB | Refills: 5 | Status: SHIPPED | OUTPATIENT
Start: 2018-02-02 | End: 2018-08-07 | Stop reason: SDUPTHER

## 2018-03-05 ENCOUNTER — OFFICE VISIT (OUTPATIENT)
Dept: MEDICAL GROUP | Facility: PHYSICIAN GROUP | Age: 53
End: 2018-03-05
Payer: COMMERCIAL

## 2018-03-05 VITALS
HEART RATE: 89 BPM | TEMPERATURE: 98.2 F | OXYGEN SATURATION: 97 % | BODY MASS INDEX: 28.58 KG/M2 | SYSTOLIC BLOOD PRESSURE: 120 MMHG | HEIGHT: 67 IN | DIASTOLIC BLOOD PRESSURE: 80 MMHG | WEIGHT: 182.1 LBS

## 2018-03-05 DIAGNOSIS — Z12.11 SCREEN FOR COLON CANCER: ICD-10-CM

## 2018-03-05 DIAGNOSIS — Z12.5 SCREENING FOR PROSTATE CANCER: ICD-10-CM

## 2018-03-05 DIAGNOSIS — C73 PAPILLARY CARCINOMA OF THYROID (HCC): ICD-10-CM

## 2018-03-05 DIAGNOSIS — F13.20 SEDATIVE, HYPNOTIC OR ANXIOLYTIC DEPENDENCE (HCC): ICD-10-CM

## 2018-03-05 DIAGNOSIS — E78.5 DYSLIPIDEMIA: ICD-10-CM

## 2018-03-05 DIAGNOSIS — E89.0 POSTSURGICAL HYPOTHYROIDISM: ICD-10-CM

## 2018-03-05 DIAGNOSIS — G47.00 INSOMNIA, UNSPECIFIED TYPE: ICD-10-CM

## 2018-03-05 DIAGNOSIS — I10 ESSENTIAL HYPERTENSION: ICD-10-CM

## 2018-03-05 PROCEDURE — 99214 OFFICE O/P EST MOD 30 MIN: CPT | Performed by: FAMILY MEDICINE

## 2018-03-05 NOTE — PROGRESS NOTES
"Subjective:      Don Olivas Jr. is a 52 y.o. male who presents with Follow-Up            HPI     Patient returns for follow-up of his medical problems.    For his papillary carcinoma of the thyroid, he underwent thyroidectomy. Ultrasound however showed he has residual tissue in both the right and left lobes. He was referred back to the surgeon and the surgeon recommended FNA of the residual tissue. The co-pay is very expensive for the patient and he would rather have total thyroidectomy done. He has not made a follow-up appointment with his endocrinologist ordered a surgeon yet. He continues to take thyroid replacement regularly.    Blood pressure is under control on losartan.    We started him on atorvastatin and of August 2017 because his ten-year ASCVD risk was 9.2%. He is taking the medication regularly without side effects. We need to do follow-up lipid panel to check for improvement.    He continues to take zolpidem as needed for insomnia. The last dose was 4 days ago. He does not take it every night.    Past medical history, past surgical history, family history reviewed-no changes    Social history reviewed-no changes    Allergies reviewed-no changes    Medications reviewed-no changes    ROS     As per history of present illness, the rest are negative.       Objective:     /80   Pulse 89   Temp 36.8 °C (98.2 °F)   Ht 1.689 m (5' 6.5\")   Wt 82.6 kg (182 lb 1.6 oz)   SpO2 97%   BMI 28.95 kg/m²      Physical Exam     Examined alert, awake, oriented, not in distress    Neck-supple, no lymphadenopathy, no thyromegaly  Lungs-clear to auscultation, no rales, no wheezes  Heart-regular rate and rhythm, no murmur  Extremities-no edema, clubbing, cyanosis          Assessment/Plan:     1. Papillary carcinoma of thyroid (CMS-HCC  He was given phone number of the surgeon so he can make an appointment for follow-up. He will discuss with surgeon if he can proceed with total thyroidectomy instead of " having FNA done first which has a very high co-pay for him.    2. Postsurgical hypothyroidism  He will follow-up with his endocrinologist. He will continue thyroid replacement.    3. Essential hypertension  Controlled on his medication.  - LIPID PROFILE; Future  - COMP METABOLIC PANEL; Future    4. Dyslipidemia  He will do follow-up lipid panel to see if this has improved with atorvastatin.  - LIPID PROFILE; Future  - COMP METABOLIC PANEL; Future    5. Insomnia, unspecified type  [facility] drug screen was done today. Controlled substance treatment agreement was signed today. His last dose of the zolpidem was 4 days ago therefore we may not be able to detect the medication in his system. He is not due for refill yet. He will not take the controlled substance with alcohol, recreational drugs or other illicit drugs.I have reviewed the medical records, the prescription monitoring program and I have determined that the controlled prescription medication is medically indicated. I have advised the patient to keep the medication in a safe place and not to drive while taking the medication.  - Controlled Substance Treatment Agreement  - [facility] SCREEN; Future    6. Sedative, hypnotic or anxiolytic dependence (CMS-HCC)  Plan the same as #5.  - Controlled Substance Treatment Agreement  [facility]; Future    7. Screen for colon cancer  This time he is ready to proceed with screening colonoscopy. He has been referred in the past but was not able to follow through. Referral placed.  - REFERRAL TO GI FOR COLONOSCOPY    8. Screening for prostate cancer  We will do screening PSA with the next blood work.  - [facility] SPECIFIC AG SCREENING; Future      Please note that this dictation was created using voice recognition software. I have worked with consultants from the vendor as well as technical experts from [employer] to optimize the interface. I have made every reasonable attempt to  correct obvious errors, but I expect that there are errors of grammar and possibly content I did not discover before finalizing the note.

## 2018-03-12 ENCOUNTER — TELEPHONE (OUTPATIENT)
Dept: MEDICAL GROUP | Facility: PHYSICIAN GROUP | Age: 53
End: 2018-03-12

## 2018-03-12 NOTE — TELEPHONE ENCOUNTER
Patient called and is in a panic about his Drug test.  It was released to My Chart and His wife say it.  And is very mad.  He would like a return call  To see what can be done.  Please call him a [phone]

## 2018-03-26 DIAGNOSIS — G47.00 INSOMNIA, UNSPECIFIED TYPE: ICD-10-CM

## 2018-03-27 RX ORDER — ZOLPIDEM TARTRATE 10 MG/1
10 TABLET ORAL NIGHTLY PRN
Qty: 30 EACH | Refills: 0 | Status: SHIPPED
Start: 2018-03-27 | End: 2018-05-02 | Stop reason: SDUPTHER

## 2018-05-02 DIAGNOSIS — G47.00 INSOMNIA, UNSPECIFIED TYPE: ICD-10-CM

## 2018-05-02 RX ORDER — ZOLPIDEM TARTRATE 10 MG/1
10 TABLET ORAL NIGHTLY PRN
Qty: 30 EACH | Refills: 0 | Status: SHIPPED
Start: 2018-05-02 | End: 2018-06-10 | Stop reason: SDUPTHER

## 2018-05-29 ENCOUNTER — APPOINTMENT (OUTPATIENT)
Dept: MEDICAL GROUP | Facility: PHYSICIAN GROUP | Age: 53
End: 2018-05-29
Payer: COMMERCIAL

## 2018-06-10 DIAGNOSIS — G47.00 INSOMNIA, UNSPECIFIED TYPE: ICD-10-CM

## 2018-06-11 RX ORDER — ZOLPIDEM TARTRATE 10 MG/1
10 TABLET ORAL NIGHTLY PRN
Qty: 30 EACH | Refills: 0 | Status: SHIPPED
Start: 2018-06-11 | End: 2018-07-31 | Stop reason: SDUPTHER

## 2018-06-11 NOTE — TELEPHONE ENCOUNTER
From: Don Olivas Jr.  Sent: 6/10/2018 8:41 PM PDT  Subject: Medication Renewal Request    Don Olivas Jr. would like a refill of the following medications:     zolpidem (AMBIEN) 10 MG Tab [Rebekah Calix M.D.]    Preferred pharmacy: [id number] - [place], [place] - [address]    Comment:

## 2018-06-11 NOTE — TELEPHONE ENCOUNTER
Was the patient seen in the last year in this department? Yes     Does patient have an active prescription for medications requested? No     Received Request Via: Pharmacy     printed.

## 2018-06-12 RX ORDER — ATORVASTATIN CALCIUM 20 MG/1
TABLET, FILM COATED ORAL
Qty: 90 TAB | Refills: 1 | Status: SHIPPED | OUTPATIENT
Start: 2018-06-12 | End: 2018-09-12

## 2018-07-31 DIAGNOSIS — G47.00 INSOMNIA, UNSPECIFIED TYPE: ICD-10-CM

## 2018-07-31 RX ORDER — ZOLPIDEM TARTRATE 10 MG/1
10 TABLET ORAL NIGHTLY PRN
Qty: 30 EACH | Refills: 0 | Status: SHIPPED
Start: 2018-07-31 | End: 2018-09-11 | Stop reason: SDUPTHER

## 2018-08-07 DIAGNOSIS — E89.0 POSTSURGICAL HYPOTHYROIDISM: ICD-10-CM

## 2018-08-07 RX ORDER — LEVOTHYROXINE SODIUM 0.1 MG/1
100 TABLET ORAL
Qty: 30 TAB | Refills: 1 | Status: SHIPPED | OUTPATIENT
Start: 2018-08-07 | End: 2018-09-20 | Stop reason: SDUPTHER

## 2018-08-07 RX ORDER — LOSARTAN POTASSIUM 25 MG/1
25 TABLET ORAL
Qty: 90 TAB | Refills: 0 | Status: SHIPPED | OUTPATIENT
Start: 2018-08-07 | End: 2018-11-19 | Stop reason: SDUPTHER

## 2018-09-10 ENCOUNTER — HOSPITAL ENCOUNTER (OUTPATIENT)
Dept: LAB | Facility: MEDICAL CENTER | Age: 53
End: 2018-09-10
Attending: FAMILY MEDICINE
Payer: COMMERCIAL

## 2018-09-10 DIAGNOSIS — Z12.5 SCREENING FOR PROSTATE CANCER: ICD-10-CM

## 2018-09-10 DIAGNOSIS — I10 ESSENTIAL HYPERTENSION: ICD-10-CM

## 2018-09-10 DIAGNOSIS — E78.5 DYSLIPIDEMIA: ICD-10-CM

## 2018-09-10 LAB
ALBUMIN SERPL BCP-MCNC: 4.3 G/DL (ref 3.2–4.9)
ALBUMIN/GLOB SERPL: 1.4 G/DL
ALP SERPL-CCNC: 68 U/L (ref 30–99)
ALT SERPL-CCNC: 38 U/L (ref 2–50)
ANION GAP SERPL CALC-SCNC: 7 MMOL/L (ref 0–11.9)
AST SERPL-CCNC: 28 U/L (ref 12–45)
BILIRUB SERPL-MCNC: 1 MG/DL (ref 0.1–1.5)
BUN SERPL-MCNC: 17 MG/DL (ref 8–22)
CALCIUM SERPL-MCNC: 8.3 MG/DL (ref 8.5–10.5)
CHLORIDE SERPL-SCNC: 105 MMOL/L (ref 96–112)
CHOLEST SERPL-MCNC: 165 MG/DL (ref 100–199)
CO2 SERPL-SCNC: 29 MMOL/L (ref 20–33)
CREAT SERPL-MCNC: 1.16 MG/DL (ref 0.5–1.4)
FASTING STATUS PATIENT QL REPORTED: NORMAL
GLOBULIN SER CALC-MCNC: 3 G/DL (ref 1.9–3.5)
GLUCOSE SERPL-MCNC: 91 MG/DL (ref 65–99)
HDLC SERPL-MCNC: 59 MG/DL
LDLC SERPL CALC-MCNC: 73 MG/DL
POTASSIUM SERPL-SCNC: 4.1 MMOL/L (ref 3.6–5.5)
PROT SERPL-MCNC: 7.3 G/DL (ref 6–8.2)
PSA SERPL-MCNC: 0.95 NG/ML (ref 0–4)
SODIUM SERPL-SCNC: 141 MMOL/L (ref 135–145)
TRIGL SERPL-MCNC: 167 MG/DL (ref 0–149)

## 2018-09-10 PROCEDURE — 80053 COMPREHEN METABOLIC PANEL: CPT

## 2018-09-10 PROCEDURE — 84153 ASSAY OF PSA TOTAL: CPT

## 2018-09-10 PROCEDURE — 80061 LIPID PANEL: CPT

## 2018-09-10 PROCEDURE — 36415 COLL VENOUS BLD VENIPUNCTURE: CPT

## 2018-09-10 RX ORDER — MELOXICAM 15 MG/1
TABLET ORAL
Qty: 30 TAB | Refills: 2 | Status: SHIPPED | OUTPATIENT
Start: 2018-09-10 | End: 2019-01-15 | Stop reason: SDUPTHER

## 2018-09-11 ENCOUNTER — OFFICE VISIT (OUTPATIENT)
Dept: MEDICAL GROUP | Facility: PHYSICIAN GROUP | Age: 53
End: 2018-09-11
Payer: COMMERCIAL

## 2018-09-11 VITALS
HEIGHT: 67 IN | BODY MASS INDEX: 27.89 KG/M2 | HEART RATE: 94 BPM | TEMPERATURE: 98.7 F | OXYGEN SATURATION: 95 % | WEIGHT: 177.69 LBS

## 2018-09-11 DIAGNOSIS — R82.5 POSITIVE URINE DRUG SCREEN: ICD-10-CM

## 2018-09-11 DIAGNOSIS — C73 PAPILLARY CARCINOMA OF THYROID (HCC): ICD-10-CM

## 2018-09-11 DIAGNOSIS — I10 ESSENTIAL HYPERTENSION: ICD-10-CM

## 2018-09-11 DIAGNOSIS — Z23 NEED FOR IMMUNIZATION AGAINST INFLUENZA: ICD-10-CM

## 2018-09-11 DIAGNOSIS — E78.5 DYSLIPIDEMIA: ICD-10-CM

## 2018-09-11 DIAGNOSIS — F13.20 SEDATIVE, HYPNOTIC OR ANXIOLYTIC DEPENDENCE (HCC): ICD-10-CM

## 2018-09-11 DIAGNOSIS — G47.00 INSOMNIA, UNSPECIFIED TYPE: ICD-10-CM

## 2018-09-11 DIAGNOSIS — E89.0 POSTSURGICAL HYPOTHYROIDISM: ICD-10-CM

## 2018-09-11 PROCEDURE — 99214 OFFICE O/P EST MOD 30 MIN: CPT | Mod: 25 | Performed by: FAMILY MEDICINE

## 2018-09-11 PROCEDURE — 90471 IMMUNIZATION ADMIN: CPT | Performed by: FAMILY MEDICINE

## 2018-09-11 PROCEDURE — 90686 IIV4 VACC NO PRSV 0.5 ML IM: CPT | Performed by: FAMILY MEDICINE

## 2018-09-11 RX ORDER — ZOLPIDEM TARTRATE 10 MG/1
10 TABLET ORAL NIGHTLY PRN
Qty: 30 EACH | Refills: 0 | Status: SHIPPED
Start: 2018-09-11 | End: 2018-10-22 | Stop reason: SDUPTHER

## 2018-09-11 ASSESSMENT — PATIENT HEALTH QUESTIONNAIRE - PHQ9: CLINICAL INTERPRETATION OF PHQ2 SCORE: 0

## 2018-09-13 NOTE — PROGRESS NOTES
"Subjective:      Don Olivas Jr. is a 52 y.o. male who presents with Thyroid Carcinoma and Medication Refill            HPI     She returns for follow-up of his medical problems.    For the insomnia, patient continues to take zolpidem 10 mg 1 tablet at bedtime with good results.  He had a positive urine drug screen in March 2018 that THC.  We counseled him on avoiding alcohol, marijuana, illegal drugs if taking zolpidem.  He said he has not been using any recreational drugs including marijuana since then.  His last dose of the zolpidem was last night.  Controlled substance treatment agreement was signed in March 2018.    In terms of papillary carcinoma of the thyroid status post total thyroidectomy in 2007, he continues to take thyroid replacement.  He has  not returned for follow-up to see his endocrinologist with the last visit in August 2017.  His last thyroid function tests were in April 2017 including thyroglobulin.  At that time the TSH was slightly suppressed with normal free T4 and undetectable antithyroglobulin antibodies and normal thyroglobulin.    For his hypertension, patient continues to take losartan without any side effects with good control of his blood pressure.    For the dyslipidemia, he continues to take atorvastatin without myalgias.    He needs flu shot today.    Past medical history, past surgical history, family history reviewed-no changes    Social history reviewed-no changes    Allergies reviewed-no changes    Medications reviewed-no changes    ROS    As per HPI, the rest are negative.     Objective:     Pulse 94   Temp 37.1 °C (98.7 °F)   Ht 1.689 m (5' 6.5\")   Wt 80.6 kg (177 lb 11.1 oz)   SpO2 95%   BMI 28.25 kg/m²      Physical Exam     Examined alert, awake, oriented, not in distress    Neck-supple, no lymphadenopathy, no thyromegaly  Lungs-clear to auscultation, no rales, no wheezes  Heart-regular rate and rhythm, no murmur  Extremities-no edema, clubbing, " [facility] Outpatient Visit on 09/10/2018   Component Date Value   • Cholesterol,Tot 09/10/2018 165    • Triglycerides 09/10/2018 167*   • HDL 09/10/2018 59    • LDL 09/10/2018 73    • Sodium 09/10/2018 141    • Potassium 09/10/2018 4.1    • Chloride 09/10/2018 105    • Co2 09/10/2018 29    • Anion Gap 09/10/2018 7.0    • Glucose 09/10/2018 91    • Bun 09/10/2018 17    • Creatinine 09/10/2018 1.16    • Calcium 09/10/2018 8.3*   • AST(SGOT) 09/10/2018 28    • ALT(SGPT) 09/10/2018 38    • Alkaline Phosphatase 09/10/2018 68    • Total Bilirubin 09/10/2018 1.0    • Albumin 09/10/2018 4.3    • Total Protein 09/10/2018 7.3    • Globulin 09/10/2018 3.0    • A-G Ratio 09/10/2018 1.4    • Prostatic Specific Antig* 09/10/2018 0.95    • Fasting Status 09/10/2018 Fasting    • GFR If  09/10/2018 >60    • GFR If Non  Ameri* 09/10/2018 >60         Assessment/Plan:     1. Insomnia, unspecified type  [facility] drug screen was done today.  I refilled the zolpidem.  Discussed avoidance of recreational drugs and illicit drugs when on zolpidem.I have reviewed the medical records, the prescription monitoring program and I have determined that the controlled prescription medication is medically indicated. I have advised the patient to keep the medication in a safe place and not to drive while taking the medication.  - [facility] SCREEN; Future  - zolpidem (AMBIEN) 10 MG Tab; Take 1 Tab by mouth at bedtime as needed for Sleep for up to 30 days.  Dispense: 30 Each; Refill: 0    2. Sedative, hypnotic or anxiolytic dependence (HCC)  Same as #1.  - [facility] SCREEN; Future  - zolpidem (AMBIEN) 10 MG Tab; Take 1 Tab by mouth at bedtime as needed for Sleep for up to 30 days.  Dispense: 30 Each; Refill: 0    3. Positive urine drug screen  He has been counseled regarding avoidance of recreational drugs including alcohol, marijuana and illegal drugs when on zolpidem.  We have done  repeat the urine drug screen today.  - [facility] PAIN MANAGEMENT SCREEN; Future    4. Papillary carcinoma of thyroid (HCC)  We will refer him back to our [facility] endocrinology group for follow-up since he has not been seen in over a year.  We will check TSH, free T4 and thyroglobulin.  - REFERRAL TO ENDOCRINOLOGY  - TSH; Future  - FREE THYROXINE; Future  - THYROGLOBULIN, QT; Future    5. Postsurgical hypothyroidism  Same as #4.  - REFERRAL TO ENDOCRINOLOGY  - TSH; Future  - FREE THYROXINE; Future  - THYROGLOBULIN, QT; Future    6. Essential hypertension  Controlled on his medications.    7. Dyslipidemia  Slight elevation of triglycerides but the rest are at target.  Continue atorvastatin.    8. Need for immunization against influenza  Flu shot was given.  - INFLUENZA VACCINE QUAD INJ >3Y(PF)      Please note that this dictation was created using voice recognition software. I have worked with consultants from the vendor as well as technical experts from [facility] to optimize the interface. I have made every reasonable attempt to correct obvious errors, but I expect that there are errors of grammar and possibly content I did not discover before finalizing the note.

## 2018-09-20 DIAGNOSIS — E89.0 POSTSURGICAL HYPOTHYROIDISM: ICD-10-CM

## 2018-09-20 RX ORDER — LEVOTHYROXINE SODIUM 0.1 MG/1
100 TABLET ORAL
Qty: 30 TAB | Refills: 1 | Status: SHIPPED | OUTPATIENT
Start: 2018-09-20 | End: 2018-11-21 | Stop reason: SDUPTHER

## 2018-09-20 NOTE — TELEPHONE ENCOUNTER
Phone Number Called: [phone] (home)     Message: left vm to notify pt medication was sent to the pharmacy.     Left Message for patient to call back: yes

## 2018-09-24 ENCOUNTER — HOSPITAL ENCOUNTER (OUTPATIENT)
Dept: LAB | Facility: MEDICAL CENTER | Age: 53
End: 2018-09-24
Attending: FAMILY MEDICINE
Payer: COMMERCIAL

## 2018-09-24 DIAGNOSIS — E89.0 POSTSURGICAL HYPOTHYROIDISM: ICD-10-CM

## 2018-09-24 DIAGNOSIS — C73 PAPILLARY CARCINOMA OF THYROID (HCC): ICD-10-CM

## 2018-09-24 LAB
T4 FREE SERPL-MCNC: 0.96 NG/DL (ref 0.53–1.43)
TSH SERPL DL<=0.005 MIU/L-ACNC: 0.04 UIU/ML (ref 0.38–5.33)

## 2018-09-24 PROCEDURE — 36415 COLL VENOUS BLD VENIPUNCTURE: CPT

## 2018-09-24 PROCEDURE — 84432 ASSAY OF THYROGLOBULIN: CPT

## 2018-09-24 PROCEDURE — 86800 THYROGLOBULIN ANTIBODY: CPT

## 2018-09-24 PROCEDURE — 84439 ASSAY OF FREE THYROXINE: CPT

## 2018-09-24 PROCEDURE — 84443 ASSAY THYROID STIM HORMONE: CPT

## 2018-09-26 LAB
THYROGLOB AB SERPL-ACNC: <0.9 IU/ML (ref 0–4)
THYROGLOB SERPL-MCNC: 2.7 NG/ML (ref 1.3–31.8)
THYROGLOB SERPL-MCNC: NORMAL NG/ML (ref 1.3–31.8)

## 2018-10-22 DIAGNOSIS — F13.20 SEDATIVE, HYPNOTIC OR ANXIOLYTIC DEPENDENCE (HCC): ICD-10-CM

## 2018-10-22 DIAGNOSIS — G47.00 INSOMNIA, UNSPECIFIED TYPE: ICD-10-CM

## 2018-10-22 RX ORDER — ZOLPIDEM TARTRATE 10 MG/1
10 TABLET ORAL NIGHTLY PRN
Qty: 30 EACH | Refills: 0 | Status: SHIPPED
Start: 2018-10-22 | End: 2018-11-27 | Stop reason: SDUPTHER

## 2018-11-19 RX ORDER — LOSARTAN POTASSIUM 25 MG/1
25 TABLET ORAL
Qty: 90 TAB | Refills: 1 | Status: SHIPPED | OUTPATIENT
Start: 2018-11-19 | End: 2019-06-03 | Stop reason: SDUPTHER

## 2018-11-21 DIAGNOSIS — E89.0 POSTSURGICAL HYPOTHYROIDISM: ICD-10-CM

## 2018-11-21 RX ORDER — LEVOTHYROXINE SODIUM 0.1 MG/1
100 TABLET ORAL
Qty: 30 TAB | Refills: 5 | Status: SHIPPED | OUTPATIENT
Start: 2018-11-21 | End: 2019-06-11 | Stop reason: SDUPTHER

## 2018-12-20 RX ORDER — ATORVASTATIN CALCIUM 20 MG/1
20 TABLET, FILM COATED ORAL DAILY
Qty: 90 TAB | Refills: 1 | Status: SHIPPED | OUTPATIENT
Start: 2018-12-20 | End: 2019-07-02 | Stop reason: SDUPTHER

## 2019-01-09 ENCOUNTER — TELEPHONE (OUTPATIENT)
Dept: MEDICAL GROUP | Facility: PHYSICIAN GROUP | Age: 54
End: 2019-01-09

## 2019-01-09 NOTE — TELEPHONE ENCOUNTER
I have not seen him for this problem in the last 1 year and so he needs to be seen first before I can do the referral.  If there is no appointment available he can schedule with a different provider.

## 2019-01-09 NOTE — TELEPHONE ENCOUNTER
Patient called about his finger and know cannot use his finger..Would like a referral to Ortho for this

## 2019-01-15 ENCOUNTER — OFFICE VISIT (OUTPATIENT)
Dept: MEDICAL GROUP | Facility: PHYSICIAN GROUP | Age: 54
End: 2019-01-15
Payer: COMMERCIAL

## 2019-01-15 VITALS
BODY MASS INDEX: 29.58 KG/M2 | HEART RATE: 81 BPM | DIASTOLIC BLOOD PRESSURE: 80 MMHG | HEIGHT: 67 IN | SYSTOLIC BLOOD PRESSURE: 120 MMHG | OXYGEN SATURATION: 96 % | TEMPERATURE: 98.6 F | WEIGHT: 188.49 LBS

## 2019-01-15 DIAGNOSIS — M65.341 TRIGGER FINGER, RIGHT RING FINGER: ICD-10-CM

## 2019-01-15 PROCEDURE — 99213 OFFICE O/P EST LOW 20 MIN: CPT | Performed by: FAMILY MEDICINE

## 2019-01-15 ASSESSMENT — PATIENT HEALTH QUESTIONNAIRE - PHQ9: CLINICAL INTERPRETATION OF PHQ2 SCORE: 0

## 2019-01-15 NOTE — PROGRESS NOTES
"Subjective:      Don Olivas Jr. is a 53 y.o. male who presents with Referral Needed (ortho) and Finger Pain        HPI:    He reports one month of right 4th finger pain. He was previously referred to orthopedics and was seen by the [facility] for similar pain to his right middle finger in 2015 for trigger finger. He had one injection of Cortisone with improvement of his pain in 2015.  He has not had any recurrence after that.  He said he has difficulty bending the right fourth finger.  Even minimal pressure causes pain. Patient denies trauma to the area. His pain is exacerbated with palpation and movement. He has taken Aleve and Meloxicam with no relief of his pain.     Past medical history, past surgical history, family history reviewed-no changes    Social history reviewed-no changes    Allergies reviewed-no changes    Medications reviewed-no changes      ROS:  As per the HPI as shown above, the rest are negative.       Objective:     /80 (BP Location: Left arm, Patient Position: Sitting, BP Cuff Size: Adult)   Pulse 81   Temp 37 °C (98.6 °F) (Temporal)   Ht 1.689 m (5' 6.5\")   Wt 85.5 kg (188 lb 7.9 oz)   SpO2 96%   BMI 29.97 kg/m²     Physical Exam  Examined alert, awake, oriented, not in distress    Extremities-Marked tenderness of the MCP joint of the right 4th finger. He cannot flex the finger at the MCP joint.  There is no erythema, swelling, intact neurovascular status, there is marked tenderness on palpation of the tendon of the fourth finger just proximal to the MCP joint       Assessment/Plan:   1. Trigger finger, right ring finger  Patient has trigger finger of his right ring finger which is a new problem. I encouraged him to apply heat to the area and to take Meloxicam daily for pain relief until he can be seen by orthopedics.  He was referred to orthopedics for further care.   - REFERRAL TO ORTHOPEDICS      I, Neetu Finnegan (Scribe), am scribing for, and in the presence of, " Rebekah Calix MD     Electronically signed by: Neetu Finnegan (Scribe), 1/15/2019    IRebekah MD personally performed the services described in this documentation, as scribed by Neetu Finnegan in my presence, and it is both accurate and complete.

## 2019-01-16 RX ORDER — MELOXICAM 15 MG/1
15 TABLET ORAL
Qty: 30 TAB | Refills: 1 | Status: SHIPPED | OUTPATIENT
Start: 2019-01-16 | End: 2019-06-03 | Stop reason: SDUPTHER

## 2019-03-11 ENCOUNTER — OFFICE VISIT (OUTPATIENT)
Dept: MEDICAL GROUP | Facility: PHYSICIAN GROUP | Age: 54
End: 2019-03-11
Payer: COMMERCIAL

## 2019-03-11 VITALS
WEIGHT: 190.48 LBS | TEMPERATURE: 98.1 F | OXYGEN SATURATION: 95 % | SYSTOLIC BLOOD PRESSURE: 130 MMHG | DIASTOLIC BLOOD PRESSURE: 80 MMHG | HEART RATE: 86 BPM | BODY MASS INDEX: 29.9 KG/M2 | HEIGHT: 67 IN

## 2019-03-11 DIAGNOSIS — E89.0 POSTSURGICAL HYPOTHYROIDISM: ICD-10-CM

## 2019-03-11 DIAGNOSIS — C73 PAPILLARY CARCINOMA OF THYROID (HCC): ICD-10-CM

## 2019-03-11 DIAGNOSIS — M25.512 ACUTE PAIN OF LEFT SHOULDER: ICD-10-CM

## 2019-03-11 DIAGNOSIS — E78.5 DYSLIPIDEMIA: ICD-10-CM

## 2019-03-11 DIAGNOSIS — I10 ESSENTIAL HYPERTENSION: ICD-10-CM

## 2019-03-11 DIAGNOSIS — G47.00 INSOMNIA, UNSPECIFIED TYPE: ICD-10-CM

## 2019-03-11 DIAGNOSIS — E55.9 VITAMIN D DEFICIENCY: ICD-10-CM

## 2019-03-11 PROCEDURE — 99214 OFFICE O/P EST MOD 30 MIN: CPT | Performed by: FAMILY MEDICINE

## 2019-03-11 NOTE — PROGRESS NOTES
Subjective:      Don Olivas Jr. is a 53 y.o. male who presents with Follow-Up        HPI:    Trigger finger of right 4th digit  I saw the patient two months ago for this issue and referred him to orthopedics. He was given a steroid injection which did not seem to provide alleviation for the first week. The plan at that time was to have surgery, but he began noticing improvement shortly after.  He has canceled the surgery.  He continues to see improvement at this time.    Insomnia  He continues to take Ambien 10 mg for this chronic issue. Last urine drug screen was in September 2018 which came back consistent with medication prescribed without illicit drugs or recreational drugs.  Prior to this drug screen in March 2018 it was positive for marijuana.  He has been counseled to avoid marijuana completely which he has done since then..    Left shoulder pain  He states he has been having pain in his left shoulder that acute onset 1 month ago. He first noticed it when putting on his jacket in the morning. Denies any trauma or other obvious cause for his pain. Exacerbation of his pain from lifting his arm laterally and moving his arm behind his back.    History of thyroid cancer  Patient has not yet set-up an appointment for follow-up regarding this issue. He states he will call today after his appointment to get set up. He continues to take thyroid replacement medication for postsurgical hypothyroidism.    Hypertension  He takes losartan for his hypertension without any side effects. Blood pressure levels in the office are within goal.     Dyslipidemia  He has been taking Atorvastatin as prescribed for his dyslipidemia without myalgias or other side effects. There are no new lab results for this visit. Triglycerides were slightly elevated when he had his last blood work in September 2018 with the other values being within target range.    Vitamin D Deficiency  He has been taking his supplements everyday. He  "began having leg cramping as well, but he increased his dosage as recommended which took away his pain. He is unsure of his current dosage.      Past medical history, past surgical history, family history reviewed-no changes    Social history reviewed-no changes    Allergies reviewed-no changes    Medications reviewed-no changes      ROS:  As per the HPI as shown above, the rest are negative.       Objective:     /80 (BP Location: Left arm, Patient Position: Sitting, BP Cuff Size: Adult)   Pulse 86   Temp 36.7 °C (98.1 °F) (Temporal)   Ht 1.689 m (5' 6.5\")   Wt 86.4 kg (190 lb 7.6 oz)   SpO2 95%   BMI 30.28 kg/m²     Physical Exam    Examined alert, awake, oriented, not in distress    Neck-supple, no lymphadenopathy, no thyromegaly  Lungs-clear to auscultation, no rales, no wheezes  Heart-regular rate and rhythm, no murmur  Extremities-no edema, clubbing, cyanosis, left shoulder exam-no deformity, no pinpoint tenderness at the acromioclavicular joint and subacromial area, no limitation of forward elevation, abduction up to 145 degrees only, slight limitation of internal and external rotation negative Diaz negative Neer's tests.      Assessment/Plan:     1. Insomnia, unspecified type  Stable on current medications. Last UDS was in September 2018 which came back consistent with medication prescribed without illicit drugs or recreational drugs.  Continue current plan of care and dosage.  - Lipid Profile; Future  - Comp Metabolic Panel; Future  - CBC WITH DIFFERENTIAL; Future  - VITAMIN D,25 HYDROXY; Future  - TSH; Future  - FREE THYROXINE; Future  - ANTITHYROGLOBULIN AB; Future    2. Essential hypertension  Stable on current medications. Continue current plan of care. Advised to avoid salty foods and improve his exercise regimen.  - Lipid Profile; Future  - Comp Metabolic Panel; Future  - CBC WITH DIFFERENTIAL; Future  - VITAMIN D,25 HYDROXY; Future  - TSH; Future  - FREE THYROXINE; Future  - " ANTITHYROGLOBULIN AB; Future    3. Dyslipidemia  Last lipid panel on 9/10/2018 showed mildly elevated triglycerides at 167. All other values were within normal limits. I advised to make diet changes to improve the glucose levels.  We discussed the plate method.  Advised to avoid sweets, decrease the carbs, exercise regularly and keep a healthy weight. We will repeat lab testing. Patient states he will complete these tomorrow when he has his shoulder xray done.   - Lipid Profile; Future  - Comp Metabolic Panel; Future  - CBC WITH DIFFERENTIAL; Future  - VITAMIN D,25 HYDROXY; Future  - TSH; Future  - FREE THYROXINE; Future  - ANTITHYROGLOBULIN AB; Future    4. Postsurgical hypothyroidism  Patient is to follow-up with an endocrinologist regarding this chronic issue. He states he will call them today to arrange an appointment. He has been to their office previously.  We will do follow-up thyroid function tests including antithyroglobulin antibody.  Continue thyroid replacement.  - Lipid Profile; Future  - Comp Metabolic Panel; Future  - CBC WITH DIFFERENTIAL; Future  - VITAMIN D,25 HYDROXY; Future  - TSH; Future  - FREE THYROXINE; Future  - ANTITHYROGLOBULIN AB; Future    5. Papillary carcinoma of thyroid (HCC)  Same as #4.  - Lipid Profile; Future  - Comp Metabolic Panel; Future  - CBC WITH DIFFERENTIAL; Future  - VITAMIN D,25 HYDROXY; Future  - TSH; Future  - FREE THYROXINE; Future  - ANTITHYROGLOBULIN AB; Future    6. Vitamin D deficiency  Continue taking OTC vitamin D supplements. Labs have been ordered for the next follow up visit.  - Lipid Profile; Future  - Comp Metabolic Panel; Future  - CBC WITH DIFFERENTIAL; Future  - VITAMIN D,25 HYDROXY; Future  - TSH; Future  - FREE THYROXINE; Future  - ANTITHYROGLOBULIN AB; Future    7. Acute pain of left shoulder  Xray of his left shoulder has been ordered.  I have advised him to perform stretching exercises which I have demonstrated for him.  - DX-SHOULDER 2+ LEFT;  Future    I, Abraham Mccormack (Scribe), am scribing for, and in the presence of, Rebekah Calix MD     Electronically signed by: Abraham Mccormack (Scribe), 3/11/2019    IRebekah MD personally performed the services described in this documentation, as scribed by Abraham Mccormack in my presence, and it is both accurate and complete.

## 2019-03-12 ENCOUNTER — HOSPITAL ENCOUNTER (OUTPATIENT)
Dept: LAB | Facility: MEDICAL CENTER | Age: 54
End: 2019-03-12
Attending: FAMILY MEDICINE
Payer: COMMERCIAL

## 2019-03-12 ENCOUNTER — HOSPITAL ENCOUNTER (OUTPATIENT)
Dept: RADIOLOGY | Facility: MEDICAL CENTER | Age: 54
End: 2019-03-12
Attending: FAMILY MEDICINE
Payer: COMMERCIAL

## 2019-03-12 DIAGNOSIS — I10 ESSENTIAL HYPERTENSION: ICD-10-CM

## 2019-03-12 DIAGNOSIS — M25.512 ACUTE PAIN OF LEFT SHOULDER: ICD-10-CM

## 2019-03-12 DIAGNOSIS — E89.0 POSTSURGICAL HYPOTHYROIDISM: ICD-10-CM

## 2019-03-12 DIAGNOSIS — E78.5 DYSLIPIDEMIA: ICD-10-CM

## 2019-03-12 DIAGNOSIS — E55.9 VITAMIN D DEFICIENCY: ICD-10-CM

## 2019-03-12 DIAGNOSIS — G47.00 INSOMNIA, UNSPECIFIED TYPE: ICD-10-CM

## 2019-03-12 DIAGNOSIS — C73 PAPILLARY CARCINOMA OF THYROID (HCC): ICD-10-CM

## 2019-03-12 LAB
25(OH)D3 SERPL-MCNC: 29 NG/ML (ref 30–100)
ALBUMIN SERPL BCP-MCNC: 4.1 G/DL (ref 3.2–4.9)
ALBUMIN/GLOB SERPL: 1.5 G/DL
ALP SERPL-CCNC: 62 U/L (ref 30–99)
ALT SERPL-CCNC: 23 U/L (ref 2–50)
ANION GAP SERPL CALC-SCNC: 11 MMOL/L (ref 0–11.9)
AST SERPL-CCNC: 22 U/L (ref 12–45)
BASOPHILS # BLD AUTO: 1.3 % (ref 0–1.8)
BASOPHILS # BLD: 0.09 K/UL (ref 0–0.12)
BILIRUB SERPL-MCNC: 0.7 MG/DL (ref 0.1–1.5)
BUN SERPL-MCNC: 16 MG/DL (ref 8–22)
CALCIUM SERPL-MCNC: 8 MG/DL (ref 8.5–10.5)
CHLORIDE SERPL-SCNC: 107 MMOL/L (ref 96–112)
CHOLEST SERPL-MCNC: 140 MG/DL (ref 100–199)
CO2 SERPL-SCNC: 23 MMOL/L (ref 20–33)
CREAT SERPL-MCNC: 1.19 MG/DL (ref 0.5–1.4)
EOSINOPHIL # BLD AUTO: 0.17 K/UL (ref 0–0.51)
EOSINOPHIL NFR BLD: 2.4 % (ref 0–6.9)
ERYTHROCYTE [DISTWIDTH] IN BLOOD BY AUTOMATED COUNT: 43.8 FL (ref 35.9–50)
FASTING STATUS PATIENT QL REPORTED: NORMAL
GLOBULIN SER CALC-MCNC: 2.8 G/DL (ref 1.9–3.5)
GLUCOSE SERPL-MCNC: 98 MG/DL (ref 65–99)
HCT VFR BLD AUTO: 47.9 % (ref 42–52)
HDLC SERPL-MCNC: 54 MG/DL
HGB BLD-MCNC: 16.6 G/DL (ref 14–18)
IMM GRANULOCYTES # BLD AUTO: 0.04 K/UL (ref 0–0.11)
IMM GRANULOCYTES NFR BLD AUTO: 0.6 % (ref 0–0.9)
LDLC SERPL CALC-MCNC: 66 MG/DL
LYMPHOCYTES # BLD AUTO: 1.37 K/UL (ref 1–4.8)
LYMPHOCYTES NFR BLD: 19.5 % (ref 22–41)
MCH RBC QN AUTO: 32.6 PG (ref 27–33)
MCHC RBC AUTO-ENTMCNC: 34.7 G/DL (ref 33.7–35.3)
MCV RBC AUTO: 94.1 FL (ref 81.4–97.8)
MONOCYTES # BLD AUTO: 0.64 K/UL (ref 0–0.85)
MONOCYTES NFR BLD AUTO: 9.1 % (ref 0–13.4)
NEUTROPHILS # BLD AUTO: 4.71 K/UL (ref 1.82–7.42)
NEUTROPHILS NFR BLD: 67.1 % (ref 44–72)
NRBC # BLD AUTO: 0 K/UL
NRBC BLD-RTO: 0 /100 WBC
PLATELET # BLD AUTO: 168 K/UL (ref 164–446)
PMV BLD AUTO: 10.9 FL (ref 9–12.9)
POTASSIUM SERPL-SCNC: 3.7 MMOL/L (ref 3.6–5.5)
PROT SERPL-MCNC: 6.9 G/DL (ref 6–8.2)
RBC # BLD AUTO: 5.09 M/UL (ref 4.7–6.1)
SODIUM SERPL-SCNC: 141 MMOL/L (ref 135–145)
T4 FREE SERPL-MCNC: 0.97 NG/DL (ref 0.53–1.43)
TRIGL SERPL-MCNC: 98 MG/DL (ref 0–149)
TSH SERPL DL<=0.005 MIU/L-ACNC: 0.98 UIU/ML (ref 0.38–5.33)
WBC # BLD AUTO: 7 K/UL (ref 4.8–10.8)

## 2019-03-12 PROCEDURE — 36415 COLL VENOUS BLD VENIPUNCTURE: CPT

## 2019-03-12 PROCEDURE — 85025 COMPLETE CBC W/AUTO DIFF WBC: CPT

## 2019-03-12 PROCEDURE — 86800 THYROGLOBULIN ANTIBODY: CPT

## 2019-03-12 PROCEDURE — 84439 ASSAY OF FREE THYROXINE: CPT

## 2019-03-12 PROCEDURE — 80053 COMPREHEN METABOLIC PANEL: CPT

## 2019-03-12 PROCEDURE — 73030 X-RAY EXAM OF SHOULDER: CPT | Mod: LT

## 2019-03-12 PROCEDURE — 82306 VITAMIN D 25 HYDROXY: CPT

## 2019-03-12 PROCEDURE — 80061 LIPID PANEL: CPT

## 2019-03-12 PROCEDURE — 84443 ASSAY THYROID STIM HORMONE: CPT

## 2019-03-13 LAB — THYROGLOB AB SERPL-ACNC: <0.9 IU/ML (ref 0–4)

## 2019-03-27 DIAGNOSIS — G47.00 INSOMNIA, UNSPECIFIED TYPE: ICD-10-CM

## 2019-03-27 RX ORDER — ZOLPIDEM TARTRATE 10 MG/1
10 TABLET ORAL NIGHTLY PRN
Qty: 30 TAB | Refills: 0 | Status: SHIPPED
Start: 2019-03-27 | End: 2019-04-29 | Stop reason: SDUPTHER

## 2019-03-27 NOTE — TELEPHONE ENCOUNTER
ALBA KENT     Age: 53  demographics  Data as of: 03/27/2019              NARCOTIC 100        SEDATIVE 220       STIMULANT 000       NARxSCORES can range from 000 to 999. The first two digits represent the composite percentile risk based on an overall analysis of prescription drug use. The third digit represents the number of active prescriptions. The distribution of scores in the population is such that approximately 75% fall below 200, 95% fall below 500 and 99% fall below 650. The information on this report is not warranted as accurate or complete. This report is based on the search criteria supplied and the data entered by the dispensing pharmacy. For more information about any prescription, please contact the dispensing pharmacy or the prescriber. NARxSCORES and Reports are intended to aid, not replace medical decision making. None of the information presented should be used as sole justification for providing or refusing to provide medications.       Rx Graph Grayed out drugs could not be included in score calculations.   [x] Narcotic [x] Sedative [x] Stimulant [x] Buprenorphine [x] Other    Created with Raphaël 2.2.0All Prescribers  Created with Raphaël [id number] - Rebekah Marlowd2 - Sussy CAMPOS Percival  Lorazepam MgEq (LME)  Created with Raphaël 2.2.2870429  Created with Raphaël 2.2.5Qaaideie86/[id number]  *Per CDC guidance, the MME conversion factors prescribed or provided as part of the medication-assisted treatment for opioid use disorder should not be used to benchmark against dosage thresholds meant for opioids prescribed for pain. Buprenorphine products have no agreed upon morphine equivalency, and as partial opioid agonists, are not expected to be associated with overdose risk in the same dose-dependent manner as doses for full agonist opioids. MME = morphine milligram equivalents. LME = Lorazepam milligram equivalents. mg = dose in milligrams.     Data Analysis    Narcotics (100) 2 months 6 months 1 year 2 years   Prescribers (narcotic, sedative) 1  19 1  12 2  16 2  11   Pharmacies (narcotic, sedative) 1  25 1  16 2  25 2  19   Morphine mg 0  0 0  0 0  0 0  0   Morphine overlap (1) 0  0 0  0 0  0 0  0           Sedatives (220) 2 months 6 months 1 year 2 years   Prescribers (narcotic, sedative) 1  19 1  12 2  16 2  11   Pharmacies (narcotic, sedative) 1  25 1  16 2  25 2  19   Sedative mg 15  20 60  46 135  57 240  66   Sedative overlap (1) 0  0 0  0 0  0 0  0           Stimulants (000)  2 months 6 months 1 year 2 years   Prescribers (stimulant) 0  0 0  0 0  0 0  0   Pharmacies (stimulant) 0  0 0  0 0  0 0  0   Stimulant days 0  0 0  0 0  0 0  0   Stimulant overlap (1) 0  0 0  0 0  0 0  0      (1) Number of days for which a simliar type of medication was prescribed from different prescribers for use on the same day.         Summary   Total Prescriptions: 16   Total Prescribers: 4   Total Pharmacies: 2   Narcotics*    (excluding buprenorphine)  Current Qty: 0   Current MME/day: 0.00   30 Day Avg MME/day: 0.00   Buprenorphine*   Current Qty: 0   Current mg/day: 0.00   30 Day Avg mg/day: 0.00   Sedatives*   Current Qty: 0   Current LME/day: 0.00   30 Day Avg LME/day: 0.40   *Highlighted drugs could not be included in score calculations   PRESCRIPTIONS  Total Prescriptions: 16   Total Private Pay: 1   Fill Date ID Written Drug Qty Days Prescriber Rx # Pharmacy Refill Daily Dose * Pymt Type    02/19/2019  3   02/19/2019  Zolpidem Tartrate 10 MG Tablet  30 30 [facility] Gal  [id number] [facility] ([id number])  0 0.50 L[place]   01/14/2019  3   01/14/2019  Zolpidem Tartrate 10 MG Tablet  30 30 [facility] Gal  [id number] [facility] ([id number][facility]  [place] 0.50 [place]   11/27/2018  3   11/27/2018  Zolpidem Tartrate 10 MG Tablet  30 30 [facility] Gal  [id number] [facility] (8963)  [place] 0.50 [place]   10/22/2018  3   10/22/2018  Zolpidem Tartrate 10 MG Tablet  30 30 Be Gal  [id number]  [facility] (8870)  [place] 0.50 [place]   09/11/2018  2   09/11/2018  Zolpidem Tartrate 10 MG Tablet  30 30 [facility] Gal  [id number] [facility] (9031)  [place] 0.50 [place]   07/31/2018  1   07/31/2018  Zolpidem Tartrate 10 MG Tablet  30 30 [facility] Gal  [id number] [facility] (0492)  0 0.50 LME  [place]   06/11/2018  1   06/11/2018  Zolpidem Tartrate 10 MG Tablet  30 30 Be Gal  [id number] [facility] (0492)  0 0.50 [place]   05/02/2018  1   05/02/2018  Zolpidem Tartrate 10 MG Tablet  30 30 Be Gal  [id number] [facility] (0492)  0 0.50 L[place]   03/27/2018  1   03/27/2018  Zolpidem Tartrate 10 MG Tablet  30 30 Me War  [id number] [facility] (0492)  0 0.50 L[place]   01/29/2018  1   01/29/2018  Zolpidem Tartrate 10 MG Tablet  30 30 Be Gal  [id number] [facility] (0492)  0 0.50 L[place]   12/04/2017  1   12/04/2017  Zolpidem Tartrate 10 MG Tablet  30 30 Be Gal  [id number] [facility] (0492)  0 0.50 L[place]   10/09/2017  1   10/09/2017  Zolpidem Tartrate 10 MG Tablet  30 30 [facility] Gal  [id number] [facility] (0492)  0 0.50 L[place]   08/25/2017  1   08/25/2017  Zolpidem Tartrate 10 MG Tablet  30 30 [facility] Gal  [id number] [facility] (0492)  0 0.50 L[place]   07/05/2017  1   07/05/2017  Zolpidem Tartrate 10 MG Tablet  30 30 Be Gal  [id number] [facility] (0492)  0 0.50 L[place]   05/10/2017  1   05/09/2017  Zolpidem Tartrate 10 MG Tablet  30 30 Be Gal  [id number] [facility] (0492)  0 0.50 LME  [place]   03/27/2017  1   03/27/2017  Zolpidem Tartrate 10 MG Tablet  30 30 Me War  [id number] [facility] (0492)  0 0.50 L[place]   *Per CDC guidance, the MME conversion factors prescribed or provided as part of the medication-assisted treatment for opioid use disorder should not be used to benchmark against dosage thresholds meant for opioids prescribed for pain. Buprenorphine products have no agreed upon morphine equivalency, and as partial opioid agonists, are not expected to be associated with overdose risk in the same dose-dependent manner as doses for full agonist opioids. MME =  morphine milligram equivalents. LME = Lorazepam milligram equivalents. MG = dose in milligrams.   PROVIDERS  Total Providers: 4   Name Address City State Zipcode Phone   Sussy Tan [address] [facility] [address]    Rebekah Calix  [facility] [address]    Rebekah Calix [address] [facility] [address]    Rebekah Calix [address]  [facility] [address]    PHARMACIES  Total Pharmacies: 2   Name Address City State Zipcode Phone   [facility] [phone]  [id number] [id number] [phone]   [facility] [phone]) [phone] [facility] [address] [phone]

## 2019-06-02 NOTE — PROGRESS NOTES
Endocrinology Clinic Progress Note  PCP: Rebekah Calix M.D.    HPI:  Don Olivas Jr. is a 53 y.o. old patient who comes in today for review of endocrine problems.    Papillary thyroid carcinoma with thyroidectomy in 2007:   Pathology showed  2.0 cm focus of papillary thyroid cancer in right thyroid lobe, no extrathyroidal extension, surgical margin uninvolved. He also had a 0.3 cm focus of papillary thyroid carcinoma in the left thyroid lobe, without extrathyroidal extension.  He did not receive radioactive iodine remnant ablation.  Thyroid bed ultrasound in May 2015 showed 22 mm mass in the right thyroid bed with internal vascular flow. It also showed possible remnant thyroid tissue measuring 12 mm in the left thyroid lobe. He also had 22 mm cystic mass in the left parotid gland. FNAC of left parotid mass in July 2015 was benign.  Thyroid ultrasound in April 2017 showed 1.2 cm residual left thyroid tissue and 1.6 cm residual right thyroid.  He was referred to Dr. Lindsey for completion of thyroidectomy but could not afford the co pay of $800.00 for another FNAC that he wanted done.  He states that he could not take the time off work at that time either.  He states he has now saved up his vacation time to have the procedure done if needed.    Hypothyroidism:  Currently taking Levothyroxine 100 mcg daily.    Vitamin D Deficiency:  Currently taking Vitamin D 4000 units daily plus Calcium with D.    ROS:  Constitutional: No unintentional weight loss  Endo: Denies excessive thirst or frequent urination  All other systems were reviewed and were negative.    Past Medical History:  Patient Active Problem List    Diagnosis Date Noted   • Sedative, hypnotic or anxiolytic dependence (HCC)    • Vitamin D deficiency 10/29/2015   • Essential hypertension 07/13/2015   • Parotid gland enlargement 06/24/2015   • HTN (hypertension)    • ASTHMA 01/09/2013   • Postsurgical hypothyroidism 10/24/2012   • Hypothyroidism    •  "Insomnia    • Papillary carcinoma of thyroid (HCC)        Medications:    Current Outpatient Prescriptions:   •  Cholecalciferol (VITAMIN D3) 2000 UNIT Cap, Take 2,000 Units by mouth 2 Times a Day., Disp: , Rfl:   •  therapeutic multivitamin-minerals (THERAGRAN-M) Tab, Take 1 Tab by mouth every day., Disp: , Rfl:   •  atorvastatin (LIPITOR) 20 MG Tab, Take 1 Tab by mouth every day., Disp: 90 Tab, Rfl: 1  •  levothyroxine (SYNTHROID) 100 MCG Tab, Take 1 Tab by mouth every day., Disp: 30 Tab, Rfl: 5  •  Aug Betamethasone Dipropionate (DIPROLENE-AF) 0.05 % Cream, APPLY TO AFFECTED AREAS TWICE DAILY., Disp: 1 Tube, Rfl: 2  •  Calcium Carb-Cholecalciferol (CALCIUM 600 + D PO), Take  by mouth 2 Times a Day., Disp: , Rfl:   •  meloxicam (MOBIC) 15 MG tablet, Take 1 Tab by mouth every day., Disp: 30 Tab, Rfl: 1  •  losartan (COZAAR) 25 MG Tab, Take 1 Tab by mouth every day., Disp: 90 Tab, Rfl: 1    Labs: Reviewed    Physical Examination:  Vital signs: Pulse 91   Ht 1.676 m (5' 6\")   Wt 84.4 kg (186 lb)   SpO2 99%   BMI 30.02 kg/m²  Body mass index is 30.02 kg/m². Patient's body mass index is 30.02 kg/m². Exercise and nutrition counseling were performed at this visit.  Walking a lot at work at [employer].  General: No apparent distress, cooperative  Eyes: No scleral icterus, no discharge, normal eyelids  Neck: No abnormal masses on inspection, scar from previous thyroidectomy present.  Resp: Normal effort, clear to auscultation bilaterally  CVS: Regular rate and rhythm, S1 S2 normal, no murmur  Extremities: No lower extremity edema  Abdomen: abdominal obesity present  Musculoskeletal: Normal digits and nails  Skin: No rash on visible skin  Psych: Alert and oriented, normal mood and affect, intact memory and able to make informed decisions.    Assessment and Plan:    1. Postoperative hypothyroidism  Instead of another completion thyroidectomy may be we can do radioactive iodine ablation to destroy the residual thyroid " tissue    2. Vitamin D deficiency  Continue vitamin D supplementation    Return in about 2 months (around 8/3/2019).    Thank you for allowing me to participate in the care of this patient.    Vadim Hyatt  This note was scribed by Ave Lucas RN CDE  CC:   Rebekah Calix M.D.    This note was created using voice recognition software (Dragon). The accuracy of the dictation is limited by the abilities of the software. I have reviewed the note prior to signing, however some errors in grammar and context are still possible. If you have any questions related to this note please do not hesitate to contact our office.

## 2019-06-03 ENCOUNTER — OFFICE VISIT (OUTPATIENT)
Dept: ENDOCRINOLOGY | Facility: MEDICAL CENTER | Age: 54
End: 2019-06-03
Payer: COMMERCIAL

## 2019-06-03 VITALS — WEIGHT: 186 LBS | BODY MASS INDEX: 29.89 KG/M2 | HEART RATE: 91 BPM | OXYGEN SATURATION: 99 % | HEIGHT: 66 IN

## 2019-06-03 DIAGNOSIS — E53.8 VITAMIN B 12 DEFICIENCY: ICD-10-CM

## 2019-06-03 DIAGNOSIS — C73 CANCER OF THYROID (HCC): ICD-10-CM

## 2019-06-03 DIAGNOSIS — E89.0 POSTOPERATIVE HYPOTHYROIDISM: ICD-10-CM

## 2019-06-03 DIAGNOSIS — E55.9 VITAMIN D DEFICIENCY: ICD-10-CM

## 2019-06-03 PROCEDURE — 99214 OFFICE O/P EST MOD 30 MIN: CPT | Performed by: INTERNAL MEDICINE

## 2019-06-03 RX ORDER — MELOXICAM 15 MG/1
15 TABLET ORAL
Qty: 30 TAB | Refills: 1 | Status: SHIPPED | OUTPATIENT
Start: 2019-06-03 | End: 2019-12-14

## 2019-06-03 RX ORDER — ACETAMINOPHEN 160 MG
2000 TABLET,DISINTEGRATING ORAL 2 TIMES DAILY
COMMUNITY
End: 2019-08-12

## 2019-06-03 RX ORDER — M-VIT,TX,IRON,MINS/CALC/FOLIC 27MG-0.4MG
1 TABLET ORAL DAILY
COMMUNITY
End: 2019-09-16

## 2019-06-03 RX ORDER — LOSARTAN POTASSIUM 25 MG/1
25 TABLET ORAL
Qty: 90 TAB | Refills: 1 | Status: SHIPPED | OUTPATIENT
Start: 2019-06-03 | End: 2019-12-13

## 2019-06-11 DIAGNOSIS — E89.0 POSTSURGICAL HYPOTHYROIDISM: ICD-10-CM

## 2019-06-11 RX ORDER — LEVOTHYROXINE SODIUM 0.1 MG/1
TABLET ORAL
Qty: 30 TAB | Refills: 5 | Status: SHIPPED | OUTPATIENT
Start: 2019-06-11 | End: 2019-08-12 | Stop reason: SDUPTHER

## 2019-06-25 ENCOUNTER — TELEPHONE (OUTPATIENT)
Dept: MEDICAL GROUP | Facility: PHYSICIAN GROUP | Age: 54
End: 2019-06-25

## 2019-06-25 DIAGNOSIS — G47.00 INSOMNIA, UNSPECIFIED TYPE: ICD-10-CM

## 2019-06-25 RX ORDER — ZOLPIDEM TARTRATE 10 MG/1
10 TABLET ORAL NIGHTLY PRN
Qty: 30 TAB | Refills: 0 | Status: SHIPPED
Start: 2019-06-29 | End: 2019-08-06 | Stop reason: SDUPTHER

## 2019-06-25 NOTE — TELEPHONE ENCOUNTER
Was the patient seen in the last year in this department? Yes    Does patient have an active prescription for medications requested? No     Received Request Via: Patient     I don't see Emiliano on Med list.

## 2019-06-25 NOTE — TELEPHONE ENCOUNTER
Last filled 5/29/19 - controlled meds have an end date so they fall off the active med list.   Can you please forward to one of the on site covering providers. Thank you!

## 2019-06-25 NOTE — TELEPHONE ENCOUNTER
----- Message from Don Olivas Jr. sent at 6/24/2019  7:32 PM PDT -----  Regarding: Prescription Question  Contact: [phone]  Hello. Can please i get a refill of my ambien? Thank you.    Don

## 2019-07-03 RX ORDER — ATORVASTATIN CALCIUM 20 MG/1
TABLET, FILM COATED ORAL
Qty: 90 TAB | Refills: 0 | Status: SHIPPED | OUTPATIENT
Start: 2019-07-03 | End: 2019-10-08 | Stop reason: SDUPTHER

## 2019-08-05 ENCOUNTER — HOSPITAL ENCOUNTER (OUTPATIENT)
Dept: LAB | Facility: MEDICAL CENTER | Age: 54
End: 2019-08-05
Attending: INTERNAL MEDICINE
Payer: COMMERCIAL

## 2019-08-05 DIAGNOSIS — C73 CANCER OF THYROID (HCC): ICD-10-CM

## 2019-08-05 DIAGNOSIS — E89.0 POSTOPERATIVE HYPOTHYROIDISM: ICD-10-CM

## 2019-08-05 DIAGNOSIS — E53.8 VITAMIN B 12 DEFICIENCY: ICD-10-CM

## 2019-08-05 DIAGNOSIS — E55.9 VITAMIN D DEFICIENCY: ICD-10-CM

## 2019-08-05 LAB
25(OH)D3 SERPL-MCNC: 40 NG/ML (ref 30–100)
T3 SERPL-MCNC: 131.7 NG/DL (ref 60–181)
T3FREE SERPL-MCNC: 3.81 PG/ML (ref 2.4–4.2)
T4 FREE SERPL-MCNC: 1.17 NG/DL (ref 0.53–1.43)
TSH SERPL DL<=0.005 MIU/L-ACNC: 0.07 UIU/ML (ref 0.38–5.33)
VIT B12 SERPL-MCNC: 527 PG/ML (ref 211–911)

## 2019-08-05 PROCEDURE — 84481 FREE ASSAY (FT-3): CPT

## 2019-08-05 PROCEDURE — 84480 ASSAY TRIIODOTHYRONINE (T3): CPT

## 2019-08-05 PROCEDURE — 84439 ASSAY OF FREE THYROXINE: CPT

## 2019-08-05 PROCEDURE — 36415 COLL VENOUS BLD VENIPUNCTURE: CPT

## 2019-08-05 PROCEDURE — 84443 ASSAY THYROID STIM HORMONE: CPT

## 2019-08-05 PROCEDURE — 82607 VITAMIN B-12: CPT

## 2019-08-05 PROCEDURE — 86800 THYROGLOBULIN ANTIBODY: CPT

## 2019-08-05 PROCEDURE — 82306 VITAMIN D 25 HYDROXY: CPT

## 2019-08-06 DIAGNOSIS — G47.00 INSOMNIA, UNSPECIFIED TYPE: ICD-10-CM

## 2019-08-06 LAB
THYROGLOB AB SERPL-ACNC: <0.9 IU/ML (ref 0–4)
THYROGLOB SERPL-MCNC: 2.2 NG/ML (ref 1.3–31.8)
THYROGLOB SERPL-MCNC: NORMAL NG/ML (ref 1.3–31.8)

## 2019-08-06 RX ORDER — ZOLPIDEM TARTRATE 10 MG/1
10 TABLET ORAL NIGHTLY PRN
Qty: 30 TAB | Refills: 0 | Status: SHIPPED
Start: 2019-08-06 | End: 2019-09-08 | Stop reason: SDUPTHER

## 2019-08-06 NOTE — TELEPHONE ENCOUNTER
Was the patient seen in the last year in this department? Yes    Does patient have an active prescription for medications requested? No     Received Request Via: Almaz                  ALBA KENT     Age: 53  demographics  Data as of: 08/06/2019              NARCOTIC 100    Created with Raphaël [id number]   SEDATIVE 220       STIMULANT 000       NARxSCORES can range from 000 to 999. The first two digits represent the composite percentile risk based on an overall analysis of prescription drug use. The third digit represents the number of active prescriptions. The distribution of scores in the population is such that approximately 75% fall below 200, 95% fall below 500 and 99% fall below 650. The information on this report is not warranted as accurate or complete. This report is based on the search criteria supplied and the data entered by the dispensing pharmacy. For more information about any prescription, please contact the dispensing pharmacy or the prescriber. NARxSCORES and Reports are intended to aid, not replace medical decision making. None of the information presented should be used as sole justification for providing or refusing to provide medications.       RX GRAPH Grayed out drugs could not be included in score calculations.   [x] Narcotic [x] Sedative [x] Stimulant [x] Buprenorphine [x] Other    Created with Raphaël 2.2.0All Prescribers  Created with Raphaël 2.2.5Xwswjqgs72/[id number] - Sussy Tan2 - Rebekah Whitney  Lorazepam MgEq (LME)  Created with Raphaël 2.2.7530266  Created with Raphaël 2.2.0Enxwkxnc56/667a0p6l8o  *Per CDC guidance, the MME conversion factors prescribed or provided as part of the medication-assisted treatment for opioid use disorder should not be used to benchmark against dosage thresholds meant for opioids prescribed for pain. Buprenorphine products have no agreed upon morphine equivalency, and as partial opioid agonists, are  not expected to be associated with overdose risk in the same dose-dependent manner as doses for full agonist opioids. MME = morphine milligram equivalents. LME = Lorazepam milligram equivalents. mg = dose in milligrams.     Data Analysis   Narcotics (100) 2 months 6 months 1 year 2 years   Prescribers (narcotic, sedative) 1  19 2  22 2  16 2  11   Pharmacies (narcotic, sedative) 1  25 1  16 1  13 2  19   Morphine mg 0  0 0  0 0  0 0  0   Morphine overlap (1) 0  0 0  0 0  0 0  0           Sedatives (220) 2 months 6 months 1 year 2 years   Prescribers (narcotic, sedative) 1  19 2  22 2  16 2  11   Pharmacies (narcotic, sedative) 1  25 1  16 1  13 2  19   Sedative mg 15  20 60  46 120  55 240  66   Sedative overlap (1) 0  0 0  0 0  0 0  0           Stimulants (000)  2 months 6 months 1 year 2 years   Prescribers (stimulant) 0  0 0  0 0  0 0  0   Pharmacies (stimulant) 0  0 0  0 0  0 0  0   Stimulant days 0  0 0  0 0  0 0  0   Stimulant overlap (1) 0  0 0  0 0  0 0  0      (1) Number of days for which a simliar type of medication was prescribed from different prescribers for use on the same day.         Summary   Total Prescriptions: 16   Total Prescribers: 2   Total Pharmacies: 2   Narcotics*    (excluding buprenorphine)  Current Qty: 0   Current MME/day: 0.00   30 Day Avg MME/day: 0.00   Buprenorphine*   Current Qty: 0   Current mg/day: 0.00   30 Day Avg mg/day: 0.00   Sedatives*   Current Qty: 0   Current LME/day: 0.00   30 Day Avg LME/day: 0.30   *Highlighted drugs could not be included in score calculations   PRESCRIPTIONS  Total Prescriptions: 16   Total Private Pay: 0   Fill Date ID Written Drug Qty Days Prescriber Rx # Pharmacy Refill Daily Dose * Pymt Type    06/25/2019  3   06/25/2019  Zolpidem Tartrate 10 MG Tablet  30.00 30 Me War  [id number]   [facility] (7470)  0  0.50 L[place]   04/30/2019  3   04/30/2019  Zolpidem Tartrate 10 MG Tablet  30.00 30 Be Gal   [id number]   [facility] (7470)  0  0.50 L[place]   03/27/2019  3   03/27/2019  Zolpidem Tartrate 10 MG Tablet  30.00 30 Be Gal  [id number]   [facility] (7470)  0  0.50 L[place]   02/19/2019  3   02/19/2019  Zolpidem Tartrate 10 MG Tablet  30.00 30 Be Gal  [id number]   [facility] (7470)  0  0.50 L[place]   01/14/2019  3   01/14/2019  Zolpidem Tartrate 10 MG Tablet  30.00 30 Be Gal  [id number]   [facility] (7470)  0  0.50 L[place]   11/27/2018  3   11/27/2018  Zolpidem Tartrate 10 MG Tablet  30.00 30 Be Gal  [id number]   [facility] (7470)  0  0.50 L[place]   10/22/2018  3   10/22/2018  Zolpidem Tartrate 10 MG Tablet  30.00 30 Be Gal  [id number]   [facility] (7470)  0  0.50 L[place]   09/11/2018  2   09/11/2018  Zolpidem Tartrate 10 MG Tablet  30.00 30 Be Gal  [id number]   [facility] (7470)  0  0.50 LME  [place]   07/31/2018  1   07/31/2018  Zolpidem Tartrate 10 MG Tablet  30.00 30 Be Gal  [id number]   [facility] (0492)  0  0.50 LME  [place]   06/11/2018  1   06/11/2018  Zolpidem Tartrate 10 MG Tablet  30.00 30 Be Gal  [id number]   [facility] (0492)  0  0.50 LME  [place]   05/02/2018  1   05/02/2018  Zolpidem Tartrate 10 MG Tablet  30.00 30 Be Gal  [id number]   [facility] (0492)  0  0.50 LME  [place]   03/27/2018  1   03/27/2018  Zolpidem Tartrate 10 MG Tablet  30.00 30 Me War  [id number]   [facility] (0492)  0  0.50 LME  [place]   01/29/2018  1   01/29/2018  Zolpidem Tartrate 10 MG Tablet  30.00 30 Be Gal  [id number]   [facility] (0492)  0  0.50 LME  [place]   12/04/2017  1   12/04/2017  Zolpidem Tartrate 10 MG Tablet  30.00 30 Be Gal  [id number]   [facility] (0492)  0  0.50 LME  [place]   10/09/2017  1   10/09/2017  Zolpidem Tartrate 10 MG Tablet  30.00 30 Be Gal  [id number]   [facility] (0492)  0  0.50 LME  [place]   08/25/2017  1   08/25/2017  Zolpidem Tartrate 10 MG Tablet  30.00 30 Be Gal  [id number]   [facility] (0492)  0  0.50 LME  [place]   *Per CDC guidance, the MME conversion factors prescribed or provided as part of the medication-assisted treatment for opioid use  disorder should not be used to benchmark against dosage thresholds meant for opioids prescribed for pain. Buprenorphine products have no agreed upon morphine equivalency, and as partial opioid agonists, are not expected to be associated with overdose risk in the same dose-dependent manner as doses for full agonist opioids. MME = morphine milligram equivalents. LME = Lorazepam milligram equivalents. MG = dose in milligrams.   PROVIDERS  Total Providers: 2   Name Address City State Zipcode Phone   Rebekah Calix [address] [place] [address]    Sussy Tan [address] [place] [address]    PHARMACIES  Total Pharmacies: 2   Name Address City State Zipcode Phone   [facility] [phone]) [address]  [facility] [place] [address] [phone]   [facility] (7372) [address] [place] [address] [phone]

## 2019-08-11 NOTE — PROGRESS NOTES
Endocrinology Clinic Progress Note  PCP: Rebekah Calix M.D.    HPI:  Don Olivas Jr. is a 53 y.o. old patient who comes in today for review of endocrine problems.    History of papillary thyroid carcinoma in 2007 with total thyroidectomy  .Pathology showed  2.0 cm focus of papillary thyroid cancer in right thyroid lobe, no extrathyroidal extension, surgical margin uninvolved. He also had a 0.3 cm focus of papillary thyroid carcinoma in the left thyroid lobe, without extrathyroidal extension.  He did not receive radioactive iodine remnant ablation.  Thyroid bed ultrasound in May 2015 showed 22 mm mass in the right thyroid bed with internal vascular flow. It also showed possible remnant thyroid tissue measuring 12 mm in the left thyroid lobe. He also had 22 mm cystic mass in the left parotid gland. FNAC of left parotid mass in July 2015 was benign.  Thyroid ultrasound in April 2017 showed 1.2 cm residual left thyroid tissue and 1.6 cm residual right thyroid.  He was referred to Dr. Lindsey for completion of thyroidectomy but could not afford the co pay of $800.00 for another FNAC.  Recent lab work done on 8/5/19 shows an antithyroglobulin of < 9.  He states he was waiting for a call for the radioactive iodine ablation.      Hypothyroidism:  Currently taking Levothyroxine 100 mcg daily.  Feeling good with no complaints.    Vitamin D Deficiency:  Currently taking Vitamin D 10,000 units daily.    ROS:  Constitutional: No unintentional weight loss  Endo: Denies excessive thirst or frequent urination  All other systems were reviewed and were negative.    Past Medical History:  Patient Active Problem List    Diagnosis Date Noted   • Sedative, hypnotic or anxiolytic dependence (HCC)    • Vitamin D deficiency 10/29/2015   • Essential hypertension 07/13/2015   • Parotid gland enlargement 06/24/2015   • HTN (hypertension)    • ASTHMA 01/09/2013   • Postsurgical hypothyroidism 10/24/2012   • Hypothyroidism    •  "Insomnia    • Papillary carcinoma of thyroid (HCC)        Medications:    Current Outpatient Medications:   •  Cholecalciferol (VITAMIN D3) 5000 units Cap, Take 1 Cap by mouth 2 Times a Day., Disp: , Rfl:   •  levothyroxine (SYNTHROID) 100 MCG Tab, Take 1 Tab by mouth every day., Disp: 90 Tab, Rfl: 3  •  zolpidem (AMBIEN) 10 MG Tab, Take 1 Tab by mouth at bedtime as needed for Sleep for up to 30 days., Disp: 30 Tab, Rfl: 0  •  atorvastatin (LIPITOR) 20 MG Tab, TAKE 1 TABLET BY MOUTH EVERY DAY, Disp: 90 Tab, Rfl: 0  •  meloxicam (MOBIC) 15 MG tablet, Take 1 Tab by mouth every day., Disp: 30 Tab, Rfl: 1  •  losartan (COZAAR) 25 MG Tab, Take 1 Tab by mouth every day., Disp: 90 Tab, Rfl: 1  •  therapeutic multivitamin-minerals (THERAGRAN-M) Tab, Take 1 Tab by mouth every day., Disp: , Rfl:   •  Aug Betamethasone Dipropionate (DIPROLENE-AF) 0.05 % Cream, APPLY TO AFFECTED AREAS TWICE DAILY., Disp: 1 Tube, Rfl: 2  •  Calcium Carb-Cholecalciferol (CALCIUM 600 + D PO), Take  by mouth 2 Times a Day., Disp: , Rfl:     Labs: Reviewed    Physical Examination:  Vital signs: /80   Pulse 86   Ht 1.676 m (5' 6\")   Wt 83 kg (183 lb)   SpO2 98%   BMI 29.54 kg/m²  Body mass index is 29.54 kg/m². Patient's body mass index is 29.54 kg/m². Exercise and nutrition counseling were performed at this visit.  Very active daily.  General: No apparent distress, cooperative  Eyes: No scleral icterus, no discharge, normal eyelids  Neck: No abnormal masses on inspection, scar from previous thyroidectomy present.   Resp: Normal effort, clear to auscultation bilaterally  CVS: Regular rate and rhythm, S1 S2 normal, no murmur  Extremities: No lower extremity edema  Abdomen: abdominal obesity present  Musculoskeletal: Normal digits and nails  Skin: No rash on visible skin  Psych: Alert and oriented, normal mood and affect, intact memory and able to make informed decisions.    Assessment and Plan:    1. Cancer of thyroid (HCC)  Will need ROA " ablation. Form faxed to nuclear medicine.    2. Postoperative hypothyroidism  Cont levothyroxine daily.     3. Vitamin D deficiency  Cont Vit D.     Return in about 3 months (around 11/12/2019).    Thank you for allowing me to participate in the care of this patient.    Vadim Hyatt M.D.  This note was scribed by Ave Lucas RN CDE  CC:   Rebekah Calix M.D.    This note was created using voice recognition software (Dragon). The accuracy of the dictation is limited by the abilities of the software. I have reviewed the note prior to signing, however some errors in grammar and context are still possible. If you have any questions related to this note please do not hesitate to contact our office.

## 2019-08-12 ENCOUNTER — OFFICE VISIT (OUTPATIENT)
Dept: ENDOCRINOLOGY | Facility: MEDICAL CENTER | Age: 54
End: 2019-08-12
Payer: COMMERCIAL

## 2019-08-12 VITALS
OXYGEN SATURATION: 98 % | BODY MASS INDEX: 29.41 KG/M2 | SYSTOLIC BLOOD PRESSURE: 120 MMHG | HEART RATE: 86 BPM | WEIGHT: 183 LBS | DIASTOLIC BLOOD PRESSURE: 80 MMHG | HEIGHT: 66 IN

## 2019-08-12 DIAGNOSIS — C73 PAPILLARY ADENOCARCINOMA OF THYROID (HCC): ICD-10-CM

## 2019-08-12 DIAGNOSIS — E89.0 POSTSURGICAL HYPOTHYROIDISM: ICD-10-CM

## 2019-08-12 DIAGNOSIS — C73 CANCER OF THYROID (HCC): ICD-10-CM

## 2019-08-12 DIAGNOSIS — E55.9 VITAMIN D DEFICIENCY: ICD-10-CM

## 2019-08-12 DIAGNOSIS — E89.0 POSTOPERATIVE HYPOTHYROIDISM: ICD-10-CM

## 2019-08-12 PROCEDURE — 99214 OFFICE O/P EST MOD 30 MIN: CPT | Performed by: INTERNAL MEDICINE

## 2019-08-12 RX ORDER — LEVOTHYROXINE SODIUM 0.1 MG/1
100 TABLET ORAL
Qty: 90 TAB | Refills: 3 | Status: SHIPPED | OUTPATIENT
Start: 2019-08-12 | End: 2020-09-08 | Stop reason: SDUPTHER

## 2019-09-08 DIAGNOSIS — G47.00 INSOMNIA, UNSPECIFIED TYPE: ICD-10-CM

## 2019-09-09 ENCOUNTER — HOSPITAL ENCOUNTER (OUTPATIENT)
Dept: RADIOLOGY | Facility: MEDICAL CENTER | Age: 54
End: 2019-09-09
Attending: INTERNAL MEDICINE
Payer: COMMERCIAL

## 2019-09-09 DIAGNOSIS — C73 MALIGNANT NEOPLASM OF THYROID GLAND (HCC): ICD-10-CM

## 2019-09-09 DIAGNOSIS — Z85.850 PERSONAL HISTORY OF MALIGNANT NEOPLASM OF THYROID: ICD-10-CM

## 2019-09-09 PROCEDURE — 700111 HCHG RX REV CODE 636 W/ 250 OVERRIDE (IP): Performed by: INTERNAL MEDICINE

## 2019-09-09 RX ORDER — ZOLPIDEM TARTRATE 10 MG/1
10 TABLET ORAL NIGHTLY PRN
Qty: 30 TAB | Refills: 0 | Status: SHIPPED
Start: 2019-09-09 | End: 2019-10-19 | Stop reason: SDUPTHER

## 2019-09-09 RX ADMIN — THYROTROPIN ALFA 0.9 MG: KIT at 13:30

## 2019-09-09 NOTE — TELEPHONE ENCOUNTER
Was the patient seen in the last year in this department? Yes    Does patient have an active prescription for medications requested? No     Received Request Via: [facility]       ALBA KENT     Age: 53  demographics  Data as of: 09/09/2019              NARCOTIC 100    Created with Raphaël 2.2.075%95%[id number]   SEDATIVE 220       STIMULANT 000       NARxSCORES can range from 000 to 999. The first two digits represent the composite percentile risk based on an overall analysis of prescription drug use. The third digit represents the number of active prescriptions. The distribution of scores in the population is such that approximately 75% fall below 200, 95% fall below 500 and 99% fall below 650. The information on this report is not warranted as accurate or complete. This report is based on the search criteria supplied and the data entered by the dispensing pharmacy. For more information about any prescription, please contact the dispensing pharmacy or the prescriber. NARxSCORES and Reports are intended to aid, not replace medical decision making. None of the information presented should be used as sole justification for providing or refusing to provide medications.       RX GRAPH Grayed out drugs could not be included in score calculations.   [x] Narcotic [x] Sedative [x] Stimulant [x] Buprenorphine [x] Other    Created with Raphaël 2.2.0All Prescribers  Created with Raphaël [id number] - Rebekah Marlowd2 - Sussy CAMPOS Tan  Lorazepam MgEq (LME)  Created with Raphaël 2.2.8075345  Created with Raphaël 2.2.0Ugdvnpwq18/[id number]  *Per CDC guidance, the MME conversion factors prescribed or provided as part of the medication-assisted treatment for opioid use disorder should not be used to benchmark against dosage thresholds meant for opioids prescribed for pain. Buprenorphine products have no agreed upon morphine equivalency, and as partial opioid agonists, are not  expected to be associated with overdose risk in the same dose-dependent manner as doses for full agonist opioids. MME = morphine milligram equivalents. LME = Lorazepam milligram equivalents. mg = dose in milligrams.     Data Analysis   Narcotics (100) 2 months 6 months 1 year 2 years   Prescribers (narcotic, sedative) 1  19 2  22 2  16 2  11   Pharmacies (narcotic, sedative) 1  25 1  16 1  13 2  19   Morphine mg 0  0 0  0 0  0 0  0   Morphine overlap (1) 0  0 0  0 0  0 0  0           Sedatives (220) 2 months 6 months 1 year 2 years   Prescribers (narcotic, sedative) 1  19 2  22 2  16 2  11   Pharmacies (narcotic, sedative) 1  25 1  16 1  13 2  19   Sedative mg 15  20 60  46 135  57 240  66   Sedative overlap (1) 0  0 0  0 0  0 0  0           Stimulants (000)  2 months 6 months 1 year 2 years   Prescribers (stimulant) 0  0 0  0 0  0 0  0   Pharmacies (stimulant) 0  0 0  0 0  0 0  0   Stimulant days 0  0 0  0 0  0 0  0   Stimulant overlap (1) 0  0 0  0 0  0 0  0      (1) Number of days for which a simliar type of medication was prescribed from different prescribers for use on the same day.         Summary   Total Prescriptions: 16   Total Prescribers: 2   Total Pharmacies: 2   Narcotics*    (excluding buprenorphine)  Current Qty: 0   Current MME/day: 0.00   30 Day Avg MME/day: 0.00   Buprenorphine*   Current Qty: 0   Current mg/day: 0.00   30 Day Avg mg/day: 0.00   Sedatives*   Current Qty: 0   Current LME/day: 0.00   30 Day Avg LME/day: 0.43   *Highlighted drugs could not be included in score calculations   PRESCRIPTIONS  Total Prescriptions: 16   Total Private Pay: 0   Fill Date ID Written Drug Qty Days Prescriber Rx # Pharmacy Refill Daily Dose * Pymt Type    08/06/2019  3   08/06/2019  Zolpidem Tartrate 10 MG Tablet  30.00 30 Be Gal  [id number]   [facility]  0  0.50 L[place]   06/25/2019  3   06/25/2019  Zolpidem Tartrate 10 MG Tablet  30.00 30 Me War   [id number]   [facility] ([facility]70[facility]  0  0.50 L[place]   04/30/2019  3   04/30/2019  Zolpidem Tartrate 10 MG Tablet  30.00 30 Be Gal  [id number]   [facility] ([facility]70[facility]  0  0.50 [place]   03/27/2019  3   03/27/2019  Zolpidem Tartrate 10 MG Tablet  30.00 30 Be Gal  [id number]   [facility] ([facility]70[facility]  0  0.50 [place]   02/19/2019  3   02/19/2019  Zolpidem Tartrate 10 MG Tablet  30.00 30 Be Gal  [id number]   [facility] ([facility]70[facility]  [place]  0.50 [place]   01/14/2019  3   01/14/2019  Zolpidem Tartrate 10 MG Tablet  30.00 30 Be Gal  [id number]   [facility] ([facility]70[facility]  0  0.50 [place]   11/27/2018  3   11/27/2018  Zolpidem Tartrate 10 MG Tablet  30.00 30 Be Gal  [id number]   [facility] ([facility]  [place]  0.50 [place]   10/22/2018  3   10/22/2018  Zolpidem Tartrate 10 MG Tablet  30.00 30 Be Gal  [id number]   [facility] ([facility]70[facility]  0  0.50 L[place]   09/11/2018  2   09/11/2018  Zolpidem Tartrate 10 MG Tablet  30.00 30 Be Gal  [id number]   [facility] ([facility]70[facility]  0  0.50 L[place]   07/31/2018  1   07/31/2018  Zolpidem Tartrate 10 MG Tablet  30.00 30 Be Gal  [id number]   [facility] (0492)  0  0.50 LME  [place]   06/11/2018  1   06/11/2018  Zolpidem Tartrate 10 MG Tablet  30.00 30 Be Gal  [id number]   [facility] (0492)  0  0.50 L[place]   05/02/2018  1   05/02/2018  Zolpidem Tartrate 10 MG Tablet  30.00 30 Be Gal  [id number]   [facility] (0492)  0  0.50 L[place]   03/27/2018  1   03/27/2018  Zolpidem Tartrate 10 MG Tablet  30.00 30 Me War  [id number]   [facility] (0492)  0  0.50 L[place]   01/29/2018  1   01/29/2018  Zolpidem Tartrate 10 MG Tablet  30.00 30 Be Gal  [id number]   [facility] (0492)  0  0.50 L[place]   12/04/2017  1   12/04/2017  Zolpidem Tartrate 10 MG Tablet  30.00 30 Be Gal  [id number]   [facility] (0492)  0  0.50 L[place]   10/09/2017  1   10/09/2017  Zolpidem Tartrate 10 MG Tablet  30.00 30 Be Gal  [id number]   [facility] (0492)  0  0.50 LME  [place]   *Per CDC guidance, the MME conversion factors prescribed or provided as part of the medication-assisted treatment for opioid use  disorder should not be used to benchmark against dosage thresholds meant for opioids prescribed for pain. Buprenorphine products have no agreed upon morphine equivalency, and as partial opioid agonists, are not expected to be associated with overdose risk in the same dose-dependent manner as doses for full agonist opioids. MME = morphine milligram equivalents. LME = Lorazepam milligram equivalents. MG = dose in milligrams.   PROVIDERS  Total Providers: 2   Name Address City State Zipcode Phone   Rebekah Calix [address] [place] [address]    Sussy Tan [address] [place] [address]    PHARMACIES  Total Pharmacies: 2   Name Address City State Zipcode Phone   [facility] [phone]) [phone]  [facility] [address] [place] [address] [phone]   [facility] (2945) [address] [place] [address] [phone]

## 2019-09-10 ENCOUNTER — HOSPITAL ENCOUNTER (OUTPATIENT)
Dept: RADIOLOGY | Facility: MEDICAL CENTER | Age: 54
End: 2019-09-10
Attending: INTERNAL MEDICINE
Payer: COMMERCIAL

## 2019-09-10 PROCEDURE — 700111 HCHG RX REV CODE 636 W/ 250 OVERRIDE (IP): Performed by: INTERNAL MEDICINE

## 2019-09-10 RX ADMIN — THYROTROPIN ALFA 0.9 MG: KIT at 13:19

## 2019-09-11 ENCOUNTER — HOSPITAL ENCOUNTER (OUTPATIENT)
Dept: RADIOLOGY | Facility: MEDICAL CENTER | Age: 54
End: 2019-09-11
Attending: INTERNAL MEDICINE
Payer: COMMERCIAL

## 2019-09-11 ENCOUNTER — TELEPHONE (OUTPATIENT)
Dept: ENDOCRINOLOGY | Facility: MEDICAL CENTER | Age: 54
End: 2019-09-11

## 2019-09-11 DIAGNOSIS — C73 MALIGNANT NEOPLASM OF THYROID GLAND (HCC): ICD-10-CM

## 2019-09-11 DIAGNOSIS — Z85.850 PERSONAL HISTORY OF MALIGNANT NEOPLASM OF THYROID: ICD-10-CM

## 2019-09-11 PROCEDURE — A9530 I131 IODIDE SOL, RX: HCPCS

## 2019-09-11 NOTE — TELEPHONE ENCOUNTER
Phone Number Called: [phone] (home)       Call outcome: spoke to patient regarding message below    Message: Spoke to patient letting him know.

## 2019-09-11 NOTE — TELEPHONE ENCOUNTER
1. Caller Name: Edmar                                          Call Back Number: [phone]        Patient approves a detailed voicemail message: no    Spoke to patient He recently got thyroid ablation and wondering what his instructions are now for how/when to take his thyroid medication.

## 2019-09-16 ENCOUNTER — OFFICE VISIT (OUTPATIENT)
Dept: MEDICAL GROUP | Facility: PHYSICIAN GROUP | Age: 54
End: 2019-09-16
Payer: COMMERCIAL

## 2019-09-16 VITALS
HEART RATE: 75 BPM | WEIGHT: 188.71 LBS | TEMPERATURE: 98.3 F | HEIGHT: 66 IN | BODY MASS INDEX: 30.33 KG/M2 | OXYGEN SATURATION: 99 % | SYSTOLIC BLOOD PRESSURE: 120 MMHG | DIASTOLIC BLOOD PRESSURE: 70 MMHG

## 2019-09-16 DIAGNOSIS — Z12.5 SCREENING FOR PROSTATE CANCER: ICD-10-CM

## 2019-09-16 DIAGNOSIS — G47.00 INSOMNIA, UNSPECIFIED TYPE: ICD-10-CM

## 2019-09-16 DIAGNOSIS — F13.20 SEDATIVE, HYPNOTIC OR ANXIOLYTIC DEPENDENCE (HCC): ICD-10-CM

## 2019-09-16 DIAGNOSIS — I10 ESSENTIAL HYPERTENSION: ICD-10-CM

## 2019-09-16 DIAGNOSIS — Z23 NEED FOR IMMUNIZATION AGAINST INFLUENZA: ICD-10-CM

## 2019-09-16 DIAGNOSIS — E78.5 DYSLIPIDEMIA: ICD-10-CM

## 2019-09-16 DIAGNOSIS — Z11.59 NEED FOR HEPATITIS C SCREENING TEST: ICD-10-CM

## 2019-09-16 DIAGNOSIS — C73 PAPILLARY CARCINOMA OF THYROID (HCC): ICD-10-CM

## 2019-09-16 DIAGNOSIS — E55.9 VITAMIN D DEFICIENCY: ICD-10-CM

## 2019-09-16 DIAGNOSIS — E89.0 POSTSURGICAL HYPOTHYROIDISM: ICD-10-CM

## 2019-09-16 PROCEDURE — 99214 OFFICE O/P EST MOD 30 MIN: CPT | Mod: 25 | Performed by: FAMILY MEDICINE

## 2019-09-16 PROCEDURE — 90686 IIV4 VACC NO PRSV 0.5 ML IM: CPT | Performed by: FAMILY MEDICINE

## 2019-09-16 PROCEDURE — 90471 IMMUNIZATION ADMIN: CPT | Performed by: FAMILY MEDICINE

## 2019-09-16 NOTE — PROGRESS NOTES
Subjective:      Don Olivas Jr. is a 53 y.o. male who presents with Hypertension      HPI:    The patient is here for follow up on his chronic medical problems.    Essential Hypertension  He takes Losartan 100 mcg for his hypertension without any side effects. Blood pressure in the office today is within goal at 120/70.    Dyslipidemia  He has been taking Atorvastatin 20 mg as prescribed for his dyslipidemia without myalgias or other side effects. Blood work was done before this visit.    Papillary carcinoma of thyroid (HCC)   The patient had radioactive iodine ablation  on 9/11/19 for thyroid cancer and received well thyroid gland post thyroidectomy. He goes back on the 31st for a follow up with Dr. Hyatt. He was told that this is better to do radiation than repeat surgery. He had complete removal of thyroid in 2007 with no radiation. Thyroid US in 20215 showing there was still thyroid tissue left bilaterally. Thyroid US in 2017 showing the same as 2015.    Postsurgical hypothyroidism  Patient continues to take thyroid replacement medication as prescribed.  This is managed by his endocrinologist Dr. Hyatt.    Insomnia  Sedative, hypnotic or anxiolytic dependence (HCC)  The patient takes Ambien 10 mg for treatment of insomnia every night. He reports that some nights he doesn't take it because he can fall asleep but he wakes up a few hours later.  He is due for urine drug screen and controlled substance treatment agreement.    Vitamin D deficiency  The patient has a history of vitamin D deficiency. He takes 4500 IU daily. We will recheck with future lab tests.    Back pain  The patient has reoccurring back pain. He had an x-ray in 2016 showing moderate DDD and facet arthropathy but no fracture or malalignment. His pain has worsened over the last week. He takes Meloxicam 15 mg for treatment of his symptoms with some help.  He denies any shooting pain down his legs.  He denies numbness or tingling  "down the legs.      Past medical history, past surgical history, family history reviewed-no changes    Social history reviewed-no changes    Allergies reviewed-no changes    Medications reviewed-no changes    ROS:  As per the HPI as shown above, the rest are negative.       Objective:     /70 (BP Location: Left arm, Patient Position: Sitting, BP Cuff Size: )   Pulse 75   Temp 36.8 °C (98.3 °F) (Temporal)   Ht 1.676 m (5' 6\")   Wt 85.6 kg (188 lb 11.4 oz)   SpO2 99%   BMI 30.46 kg/m²     Physical Exam  Examined alert, awake, oriented, not in distress    Neck-supple, no lymphadenopathy, no thyromegaly  Lungs-clear to auscultation, no rales, no wheezes  Heart-regular rate and rhythm, no murmur  Extremities-no edema, clubbing, cyanosis  Back exam- no pinpoint tenderness spinous processes lumbosacral spine, no spasms of the paravertebral muscles  Neuro exam- motor strength 5/5 upper and lower extremities, DTRs 2+, sensation intact to light touch          Assessment/Plan:     1. Essential hypertension  Blood pressure is stable and within goal on current medications. We will continue the current dosages. I have advised him to avoid salty foods and exercise regularly.    2. Dyslipidemia  Last blood work in 2019 came back all at target on atorvastatin.  We will do an updated blood work.    3. Papillary carcinoma of thyroid (HCC)  Status post total thyroidectomy in .  Thyroid ultrasound in  and  showed he has residual thyroid tissue versus recurrence.  He just had radioactive iodine ablation done last week for further management by his endocrinologist.  We will follow along.    4. Postsurgical hypothyroidism  Stable on thyroid replacement medications.  Followed by his endocrinologist.  We will follow along.    5. Insomnia, unspecified type  Stable on Ambien.  He is due for urine drug screen and this was done today.  We did an updated controlled substance treatment agreement as well.  He " is not due yet for refill.  - Pain Management Screen; Future  - Controlled Substance Treatment Agreement    6. Sedative, hypnotic or anxiolytic dependence (HCC)  Same as #5.  - Pain Management Screen; Future  - Controlled Substance Treatment Agreement    7. Vitamin D deficiency  The patient has a history of Vitamin D deficiency. He takes 4500 IU of Vitamin D daily.  Most recent vitamin D level done by his endocrinologist was improved from 29-40.  Continue current dose of vitamin D supplementation     8. Need for immunization against influenza  The patient is due for a flu shot.  - Influenza Vaccine Quad Injection (PF)    9. Need for hepatitis C screening test  The patient agrees to have the CDC recommended hepatitis C screening added to his blood work.   - Lipid Profile; Future  - Comp Metabolic Panel; Future  - HEP C VIRUS ANTIBODY; Future  - PROSTATE SPECIFIC AG SCREENING; Future    10. Screening for prostate cancer  The patient is due for a prostate cancer screening  - Lipid Profile; Future  - Comp Metabolic Panel; Future  - HEP C VIRUS ANTIBODY; Future  - PROSTATE SPECIFIC AG SCREENING; Future      Angela DOWNEY (Scribdonte), am scribing for, and in the presence of, Rebekah Calix MD     Electronically signed by: Angela Vásquez (Rajanibe), 9/16/2019    IRebekah MD personally performed the services described in this documentation, as scribed by Angela Vásquez in my presence, and it is both accurate and complete.

## 2019-09-17 ENCOUNTER — HOSPITAL ENCOUNTER (OUTPATIENT)
Dept: LAB | Facility: MEDICAL CENTER | Age: 54
End: 2019-09-17
Attending: FAMILY MEDICINE
Payer: COMMERCIAL

## 2019-09-17 DIAGNOSIS — I10 ESSENTIAL HYPERTENSION: ICD-10-CM

## 2019-09-17 DIAGNOSIS — Z12.5 SCREENING FOR PROSTATE CANCER: ICD-10-CM

## 2019-09-17 DIAGNOSIS — G47.00 INSOMNIA, UNSPECIFIED TYPE: ICD-10-CM

## 2019-09-17 DIAGNOSIS — E78.5 DYSLIPIDEMIA: ICD-10-CM

## 2019-09-17 DIAGNOSIS — F13.20 SEDATIVE, HYPNOTIC OR ANXIOLYTIC DEPENDENCE (HCC): ICD-10-CM

## 2019-09-17 DIAGNOSIS — Z11.59 NEED FOR HEPATITIS C SCREENING TEST: ICD-10-CM

## 2019-09-17 LAB
ALBUMIN SERPL BCP-MCNC: 4.2 G/DL (ref 3.2–4.9)
ALBUMIN/GLOB SERPL: 1.5 G/DL
ALP SERPL-CCNC: 69 U/L (ref 30–99)
ALT SERPL-CCNC: 35 U/L (ref 2–50)
ANION GAP SERPL CALC-SCNC: 10 MMOL/L (ref 0–11.9)
AST SERPL-CCNC: 26 U/L (ref 12–45)
BILIRUB SERPL-MCNC: 0.7 MG/DL (ref 0.1–1.5)
BUN SERPL-MCNC: 19 MG/DL (ref 8–22)
CALCIUM SERPL-MCNC: 8.1 MG/DL (ref 8.5–10.5)
CHLORIDE SERPL-SCNC: 104 MMOL/L (ref 96–112)
CHOLEST SERPL-MCNC: 134 MG/DL (ref 100–199)
CO2 SERPL-SCNC: 26 MMOL/L (ref 20–33)
CREAT SERPL-MCNC: 1.21 MG/DL (ref 0.5–1.4)
FASTING STATUS PATIENT QL REPORTED: NORMAL
GLOBULIN SER CALC-MCNC: 2.8 G/DL (ref 1.9–3.5)
GLUCOSE SERPL-MCNC: 90 MG/DL (ref 65–99)
HCV AB SER QL: NEGATIVE
HDLC SERPL-MCNC: 48 MG/DL
LDLC SERPL CALC-MCNC: 65 MG/DL
POTASSIUM SERPL-SCNC: 3.9 MMOL/L (ref 3.6–5.5)
PROT SERPL-MCNC: 7 G/DL (ref 6–8.2)
PSA SERPL-MCNC: 0.87 NG/ML (ref 0–4)
SODIUM SERPL-SCNC: 140 MMOL/L (ref 135–145)
TRIGL SERPL-MCNC: 106 MG/DL (ref 0–149)

## 2019-09-17 PROCEDURE — 80053 COMPREHEN METABOLIC PANEL: CPT

## 2019-09-17 PROCEDURE — 80061 LIPID PANEL: CPT

## 2019-09-17 PROCEDURE — 84153 ASSAY OF PSA TOTAL: CPT

## 2019-09-17 PROCEDURE — 36415 COLL VENOUS BLD VENIPUNCTURE: CPT

## 2019-09-17 PROCEDURE — 86803 HEPATITIS C AB TEST: CPT

## 2019-09-30 ENCOUNTER — HOSPITAL ENCOUNTER (OUTPATIENT)
Dept: RADIOLOGY | Facility: MEDICAL CENTER | Age: 54
End: 2019-09-30
Attending: INTERNAL MEDICINE
Payer: COMMERCIAL

## 2019-09-30 DIAGNOSIS — C73 MALIGNANT NEOPLASM OF THYROID GLAND (HCC): ICD-10-CM

## 2019-09-30 DIAGNOSIS — Z85.850 PERSONAL HISTORY OF MALIGNANT NEOPLASM OF THYROID: ICD-10-CM

## 2019-09-30 PROCEDURE — 78018 THYROID MET IMAGING BODY: CPT

## 2019-10-09 RX ORDER — ATORVASTATIN CALCIUM 20 MG/1
TABLET, FILM COATED ORAL
Qty: 90 TAB | Refills: 0 | Status: SHIPPED | OUTPATIENT
Start: 2019-10-09 | End: 2020-01-03

## 2019-10-19 DIAGNOSIS — G47.00 INSOMNIA, UNSPECIFIED TYPE: ICD-10-CM

## 2019-10-21 RX ORDER — ZOLPIDEM TARTRATE 10 MG/1
10 TABLET ORAL NIGHTLY PRN
Qty: 30 TAB | Refills: 0 | Status: SHIPPED
Start: 2019-10-21 | End: 2019-12-02 | Stop reason: SDUPTHER

## 2019-12-02 DIAGNOSIS — G47.00 INSOMNIA, UNSPECIFIED TYPE: ICD-10-CM

## 2019-12-02 RX ORDER — ZOLPIDEM TARTRATE 10 MG/1
10 TABLET ORAL NIGHTLY PRN
Qty: 30 TAB | Refills: 0 | Status: SHIPPED
Start: 2019-12-02 | End: 2020-01-02 | Stop reason: SDUPTHER

## 2019-12-02 RX ORDER — BETAMETHASONE DIPROPIONATE 0.5 MG/G
CREAM TOPICAL
Qty: 30 G | Refills: 1 | Status: SHIPPED | OUTPATIENT
Start: 2019-12-02 | End: 2020-12-08

## 2019-12-02 NOTE — TELEPHONE ENCOUNTER
ALBA KENT     Age: 54  demographics  Data as of: 12/02/2019              NARCOTIC 100    Created with Raphaël 2.2.075%95%[id number]   SEDATIVE 220       STIMULANT 000       NARxSCORES can range from 000 to 999. The first two digits represent the composite percentile risk based on an overall analysis of prescription drug use. The third digit represents the number of active prescriptions. The distribution of scores in the population is such that approximately 75% fall below 200, 95% fall below 500 and 99% fall below 650. The information on this report is not warranted as accurate or complete. This report is based on the search criteria supplied and the data entered by the dispensing pharmacy. For more information about any prescription, please contact the dispensing pharmacy or the prescriber. NARxSCORES and Reports are intended to aid, not replace medical decision making. None of the information presented should be used as sole justification for providing or refusing to provide medications.       RX GRAPH Grayed out drugs could not be included in score calculations.   [x] Narcotic [x] Sedative [x] Stimulant [x] Buprenorphine [x] Other    Created with Raphaël 2.2.0All Prescribers  Created with Raphaël 2.2.0Eggyuhdc19/[id number] - Rebekah Bangonzalo Gald2 - Sussy CAMPOS Tan  Lorazepam MgEq (LME)  Created with Raphaël 2.2.7146952  Created with Raphaël 2.2.7Elscoqzi13/[id number]  *Per CDC guidance, the MME conversion factors prescribed or provided as part of the medication-assisted treatment for opioid use disorder should not be used to benchmark against dosage thresholds meant for opioids prescribed for pain. Buprenorphine products have no agreed upon morphine equivalency, and as partial opioid agonists, are not expected to be associated with overdose risk in the same dose-dependent manner as doses for full agonist opioids. MME = morphine milligram equivalents. LME = Lorazepam milligram  equivalents. mg = dose in milligrams.     Data Analysis   Narcotics (100) 2 months 6 months 1 year 2 years   Prescribers (narcotic, sedative) 1  19 2  22 2  16 2  11   Pharmacies (narcotic, sedative) 1  25 1  16 1  13 2  19   Morphine mg 0  0 0  0 0  0 0  0   Morphine overlap (1) 0  0 0  0 0  0 0  0           Sedatives (220) 2 months 6 months 1 year 2 years   Prescribers (narcotic, sedative) 1  19 2  22 2  16 2  11   Pharmacies (narcotic, sedative) 1  25 1  16 1  13 2  19   Sedative mg 15  20 60  46 120  55 255  66   Sedative overlap (1) 0  0 0  0 0  0 0  0           Stimulants (000)  2 months 6 months 1 year 2 years   Prescribers (stimulant) 0  0 0  0 0  0 0  0   Pharmacies (stimulant) 0  0 0  0 0  0 0  0   Stimulant days 0  0 0  0 0  0 0  0   Stimulant overlap (1) 0  0 0  0 0  0 0  0      (1) Number of days for which a simliar type of medication was prescribed from different prescribers for use on the same day.         Summary   Total Prescriptions: 17   Total Prescribers: 2   Total Pharmacies: 2   Narcotics*    (excluding buprenorphine)  Current Qty: 0   Current MME/day: 0.00   30 Day Avg MME/day: 0.00   Buprenorphine*   Current Qty: 0   Current mg/day: 0.00   30 Day Avg mg/day: 0.00   Sedatives*   Current Qty: 0   Current LME/day: 0.00   30 Day Avg LME/day: 0.30   *Highlighted drugs could not be included in score calculations   PRESCRIPTIONS  Total Prescriptions: 17   Total Private Pay: 0   Fill Date ID Written Drug Qty Days Prescriber Rx # Pharmacy Refill Daily Dose * Pymt Type    10/21/2019  3   10/21/2019  Zolpidem Tartrate 10 MG Tablet  30.00 30 Be Gal  [id number]   [facility]  0  0.50 L[place]  [facility]  [place]   09/09/2019  3   09/09/2019  Zolpidem Tartrate 10 MG Tablet  30.00 30 Be Gal  [id number]   [facility] (7495)  0  0.50 L[place]   08/06/2019  3   08/06/2019  Zolpidem Tartrate 10 MG Tablet  30.00 30 Be Gal  [id number]   [facility] (0269)  0  0.50 L[place]    06/25/2019  3   06/25/2019  Zolpidem Tartrate 10 MG Tablet  30.00 30 Me War  [id number]   [facility] (7470)  0  0.50 L[place]   04/30/2019  3   04/30/2019  Zolpidem Tartrate 10 MG Tablet  30.00 30 Be Gal  [id number]   [facility] (7470)  0  0.50 L[place]   03/27/2019  3   03/27/2019  Zolpidem Tartrate 10 MG Tablet  30.00 30 Be Gal  [id number]   [facility] (7470)  0  0.50 L[place]   02/19/2019  3   02/19/2019  Zolpidem Tartrate 10 MG Tablet  30.00 30 Be Gal  [id number]   [facility] (7470)  0  0.50 L[place]   01/14/2019  3   01/14/2019  Zolpidem Tartrate 10 MG Tablet  30.00 30 Be Gal  [id number]   [facility] (7470)  0  0.50 L[place]   11/27/2018  3   11/27/2018  Zolpidem Tartrate 10 MG Tablet  30.00 30 Be Gal  [id number]   [facility] (7470)  0  0.50 L[place]   10/22/2018  3   10/22/2018  Zolpidem Tartrate 10 MG Tablet  30.00 30 Be Gal  [id number]   [facility] (7470)  0  0.50 L[place]   09/11/2018  2   09/11/2018  Zolpidem Tartrate 10 MG Tablet  30.00 30 Be Gal  [id number]   [facility] (7470)  0  0.50 L[place]   07/31/2018  1   07/31/2018  Zolpidem Tartrate 10 MG Tablet  30.00 30 Be Gal  [id number]   [facility] (0492)  0  0.50 LME  Other  [place]   06/11/2018  1   06/11/2018  Zolpidem Tartrate 10 MG Tablet  30.00 30 Be Gal  [id number]   [facility] (0492)  0  0.50 LME  [place]   05/02/2018  1   05/02/2018  Zolpidem Tartrate 10 MG Tablet  30.00 30 Be Gal  [id number]   [facility] (0492)  0  0.50 LME  [place]   03/27/2018  1   03/27/2018  Zolpidem Tartrate 10 MG Tablet  30.00 30 Me War  [id number]   [facility] (0492)  0  0.50 LME  [place]   01/29/2018  1   01/29/2018  Zolpidem Tartrate 10 MG Tablet  30.00 30 Be Gal  [id number]   [facility] (0492)  0  0.50 LME  [place]   12/04/2017  1   12/04/2017  Zolpidem Tartrate 10 MG Tablet  30.00 30 Be Gal  [id number]   [facility] (0492)  0  0.50 LME  [place]   *Per CDC guidance, the MME conversion factors prescribed or provided as part of the medication-assisted treatment for opioid use disorder should not be used to benchmark against  dosage thresholds meant for opioids prescribed for pain. Buprenorphine products have no agreed upon morphine equivalency, and as partial opioid agonists, are not expected to be associated with overdose risk in the same dose-dependent manner as doses for full agonist opioids. MME = morphine milligram equivalents. LME = Lorazepam milligram equivalents. MG = dose in milligrams.   PROVIDERS  Total Providers: 2   Name Address City State Zipcode Phone   Sussy Tan [address] [place] [address]    Rebekah Calix [address] [place] [address]    PHARMACIES  Total Pharmacies: 2   Name Address City State Zipcode Phone   [facility] [phone]  [facility] [address] [place] [address] [phone]   [facility] [phone]) [phone] [facility] [address] [phone]

## 2019-12-14 RX ORDER — LOSARTAN POTASSIUM 25 MG/1
25 TABLET ORAL DAILY
Qty: 90 TAB | Refills: 1 | Status: SHIPPED | OUTPATIENT
Start: 2019-12-14 | End: 2020-05-13

## 2019-12-14 RX ORDER — MELOXICAM 15 MG/1
TABLET ORAL
Qty: 30 TAB | Refills: 1 | Status: SHIPPED | OUTPATIENT
Start: 2019-12-14 | End: 2020-03-23

## 2020-01-02 DIAGNOSIS — G47.00 INSOMNIA, UNSPECIFIED TYPE: ICD-10-CM

## 2020-01-03 RX ORDER — ATORVASTATIN CALCIUM 20 MG/1
TABLET, FILM COATED ORAL
Qty: 90 TAB | Refills: 1 | Status: SHIPPED | OUTPATIENT
Start: 2020-01-03 | End: 2020-07-10

## 2020-01-03 RX ORDER — ZOLPIDEM TARTRATE 10 MG/1
10 TABLET ORAL NIGHTLY PRN
Qty: 30 TAB | Refills: 0 | Status: SHIPPED
Start: 2020-01-03 | End: 2020-02-04 | Stop reason: SDUPTHER

## 2020-01-03 NOTE — TELEPHONE ENCOUNTER
ALBA DONTE     Age: 54  demographics  Data as of: 01/03/2020              NARCOTIC 100    Created with Raphaël 2.3.075%95%99%[id number]   SEDATIVE 220       STIMULANT 000       NARxSCORES can range from 000 to 999. The first two digits represent the composite percentile risk based on an overall analysis of prescription drug use. The third digit represents the number of active prescriptions. The distribution of scores in the population is such that approximately 75% fall below 200, 95% fall below 500 and 99% fall below 650. The information on this report is not warranted as accurate or complete. This report is based on the search criteria supplied and the data entered by the dispensing pharmacy. For more information about any prescription, please contact the dispensing pharmacy or the prescriber. NARxSCORES and Reports are intended to aid, not replace medical decision making. None of the information presented should be used as sole justification for providing or refusing to provide medications.       RX GRAPH Grayed out drugs could not be included in score calculations.   [x] Narcotic [x] Sedative [x] Stimulant [x] Buprenorphine [x] Other    Created with Raphaël 2.3.0All Prescribers  Created with Raphaël 2.3.9Tqozslvs60/256q1x1i7nZmetxogysjt2 - Rebekah Bantug Gald2 - Sussy CAMPOS Tan  Lorazepam MgEq (LME)  Created with Raphaël 2.3.8245863  Created with Raphaël 2.3.2Dgiirwve69/121e4e1v8f  *Per CDC guidance, the MME conversion factors prescribed or provided as part of the medication-assisted treatment for opioid use disorder should not be used to benchmark against dosage thresholds meant for opioids prescribed for pain. Buprenorphine products have no agreed upon morphine equivalency, and as partial opioid agonists, are not expected to be associated with overdose risk in the same dose-dependent manner as doses for full agonist opioids. MME = morphine milligram equivalents. LME = Lorazepam milligram  equivalents. mg = dose in milligrams.     Data Analysis   Narcotics (100) 2 months 6 months 1 year 2 years   Prescribers (narcotic, sedative) 1  19 1  12 2  16 2  11   Pharmacies (narcotic, sedative) 1  25 1  16 1  13 2  19   Morphine mg 0  0 0  0 0  0 0  0   Morphine overlap (1) 0  0 0  0 0  0 0  0           Sedatives (220) 2 months 6 months 1 year 2 years   Prescribers (narcotic, sedative) 1  19 1  12 2  16 2  11   Pharmacies (narcotic, sedative) 1  25 1  16 1  13 2  19   Sedative mg 15  20 60  46 135  57 255  66   Sedative overlap (1) 0  0 0  0 0  0 0  0           Stimulants (000)  2 months 6 months 1 year 2 years   Prescribers (stimulant) 0  0 0  0 0  0 0  0   Pharmacies (stimulant) 0  0 0  0 0  0 0  0   Stimulant days 0  0 0  0 0  0 0  0   Stimulant overlap (1) 0  0 0  0 0  0 0  0      (1) Number of days for which a simliar type of medication was prescribed from different prescribers for use on the same day.         Summary   Total Prescriptions: 17   Total Prescribers: 2   Total Pharmacies: 2   Narcotics*    (excluding buprenorphine)  Current Qty: 0   Current MME/day: 0.00   30 Day Avg MME/day: 0.00   Buprenorphine*   Current Qty: 0   Current mg/day: 0.00   30 Day Avg mg/day: 0.00   Sedatives*   Current Qty: 0   Current LME/day: 0.00   30 Day Avg LME/day: 0.47   *Highlighted drugs could not be included in score calculations   PRESCRIPTIONS  Total Prescriptions: 17   Total Private Pay: 0   Fill Date ID Written Drug Qty Days Prescriber Rx # Pharmacy Refill Daily Dose * Pymt Type    12/02/2019  3   12/02/2019  Zolpidem Tartrate 10 MG Tablet  30.00 30 Be Gal  [id number]   [facility] (7[facility]  0  0.50 L[place]   10/21/2019  3   10/21/2019  Zolpidem Tartrate 10 MG Tablet  30.00 30 Be Gal  [id number]   [facility] (3828)  0  0.50 [place]   09/09/2019  3   09/09/2019  Zolpidem Tartrate 10 MG Tablet  30.00 30 Be Gal  [id number]   [facility] (1925)  0  0.50 [place]    08/06/2019  3   08/06/2019  Zolpidem Tartrate 10 MG Tablet  30.00 30 Be Gal  [id number]   [facility] (7470)  0  0.50 [place]   06/25/2019  3   06/25/2019  Zolpidem Tartrate 10 MG Tablet  30.00 30 Me War  [id number]   [facility] (7470)  0  0.50 [place]   04/30/2019  3   04/30/2019  Zolpidem Tartrate 10 MG Tablet  30.00 30 Be Gal  [id number]   [facility] (7470)  0  0.50 [place]   03/27/2019  3   03/27/2019  Zolpidem Tartrate 10 MG Tablet  30.00 30 Be Gal  [id number]   [facility] (7470)  0  0.50 [place]   02/19/2019  3   02/19/2019  Zolpidem Tartrate 10 MG Tablet  30.00 30 Be Gal  [id number]   [facility] (7470)  0  0.50 [place]   01/14/2019  3   01/14/2019  Zolpidem Tartrate 10 MG Tablet  30.00 30 Be Gal  [id number]   [facility] (7470)  0  0.50 [place]   11/27/2018  3   11/27/2018  Zolpidem Tartrate 10 MG Tablet  30.00 30 Be Gal  [id number]   [facility] (7470)  0  0.50 [place]   10/22/2018  3   10/22/2018  Zolpidem Tartrate 10 MG Tablet  30.00 30 Be Gal  [id number]   [facility] (7470)  0  0.50 [place]   09/11/2018  2   09/11/2018  Zolpidem Tartrate 10 MG Tablet  30.00 30 Be Gal  [id number]   [facility] (7470)  0  0.50 [place]   07/31/2018  1   07/31/2018  Zolpidem Tartrate 10 MG Tablet  30.00 30 Be Gal  [id number]   [facility] (0492)  0  0.50 LME  [place]   06/11/2018  1   06/11/2018  Zolpidem Tartrate 10 MG Tablet  30.00 30 Be Gal  [id number]   [facility] (0492)  0  0.50 L[place]   05/02/2018  1   05/02/2018  Zolpidem Tartrate 10 MG Tablet  30.00 30 Be Gal  [id number]   [facility] (0492)  0  0.50 L[place]   03/27/2018  1   03/27/2018  Zolpidem Tartrate 10 MG Tablet  30.00 30 Me War  [id number]   [facility] (0492)  0  0.50 L[place]   01/29/2018  1   01/29/2018  Zolpidem Tartrate 10 MG Tablet  30.00 30 Be Gal  [id number]   [facility] (0492)  0  0.50 LME  [place]   *Per CDC guidance, the MME conversion factors prescribed or provided as part of the medication-assisted treatment for opioid use disorder should not be used to benchmark against  dosage thresholds meant for opioids prescribed for pain. Buprenorphine products have no agreed upon morphine equivalency, and as partial opioid agonists, are not expected to be associated with overdose risk in the same dose-dependent manner as doses for full agonist opioids. MME = morphine milligram equivalents. LME = Lorazepam milligram equivalents. MG = dose in milligrams.   PROVIDERS  Total Providers: 2   Name Address City State Zipcode Phone   Sussy Tan [address] [place] [address]    Rebekah Calix [address] [place] [address]    PHARMACIES  Total Pharmacies: 2   Name Address City State Zipcode Phone   [facility] [phone]  [facility] [id number] [place] [address] [phone]   [facility] [phone]) [address] [facility] [address] [phone]

## 2020-01-06 ENCOUNTER — HOSPITAL ENCOUNTER (OUTPATIENT)
Dept: LAB | Facility: MEDICAL CENTER | Age: 55
End: 2020-01-06
Attending: INTERNAL MEDICINE
Payer: COMMERCIAL

## 2020-01-06 DIAGNOSIS — E55.9 VITAMIN D DEFICIENCY: ICD-10-CM

## 2020-01-06 DIAGNOSIS — C73 PAPILLARY ADENOCARCINOMA OF THYROID (HCC): ICD-10-CM

## 2020-01-06 DIAGNOSIS — E89.0 POSTOPERATIVE HYPOTHYROIDISM: ICD-10-CM

## 2020-01-06 PROCEDURE — 84481 FREE ASSAY (FT-3): CPT

## 2020-01-06 PROCEDURE — 84432 ASSAY OF THYROGLOBULIN: CPT

## 2020-01-06 PROCEDURE — 86800 THYROGLOBULIN ANTIBODY: CPT

## 2020-01-06 PROCEDURE — 84439 ASSAY OF FREE THYROXINE: CPT

## 2020-01-06 PROCEDURE — 82306 VITAMIN D 25 HYDROXY: CPT

## 2020-01-06 PROCEDURE — 84443 ASSAY THYROID STIM HORMONE: CPT

## 2020-01-06 PROCEDURE — 84480 ASSAY TRIIODOTHYRONINE (T3): CPT

## 2020-01-06 PROCEDURE — 36415 COLL VENOUS BLD VENIPUNCTURE: CPT

## 2020-01-07 ENCOUNTER — OFFICE VISIT (OUTPATIENT)
Dept: ENDOCRINOLOGY | Facility: MEDICAL CENTER | Age: 55
End: 2020-01-07
Payer: COMMERCIAL

## 2020-01-07 VITALS
BODY MASS INDEX: 30.53 KG/M2 | OXYGEN SATURATION: 94 % | HEIGHT: 66 IN | WEIGHT: 190 LBS | DIASTOLIC BLOOD PRESSURE: 84 MMHG | HEART RATE: 72 BPM | SYSTOLIC BLOOD PRESSURE: 118 MMHG

## 2020-01-07 DIAGNOSIS — E55.9 VITAMIN D DEFICIENCY: ICD-10-CM

## 2020-01-07 DIAGNOSIS — C73 PAPILLARY ADENOCARCINOMA OF THYROID (HCC): ICD-10-CM

## 2020-01-07 DIAGNOSIS — E89.0 POSTOPERATIVE HYPOTHYROIDISM: ICD-10-CM

## 2020-01-07 LAB
25(OH)D3 SERPL-MCNC: 48 NG/ML (ref 30–100)
T3 SERPL-MCNC: 115.1 NG/DL (ref 60–181)
T3FREE SERPL-MCNC: 3.29 PG/ML (ref 2.4–4.2)
T4 FREE SERPL-MCNC: 0.86 NG/DL (ref 0.53–1.43)
TSH SERPL DL<=0.005 MIU/L-ACNC: 1.56 UIU/ML (ref 0.38–5.33)

## 2020-01-07 PROCEDURE — 99214 OFFICE O/P EST MOD 30 MIN: CPT | Performed by: INTERNAL MEDICINE

## 2020-01-07 RX ORDER — ERGOCALCIFEROL (VITAMIN D2) 50 MCG
CAPSULE ORAL
COMMUNITY

## 2020-01-08 LAB
THYROGLOB AB SERPL-ACNC: <0.9 IU/ML (ref 0–4)
THYROGLOB SERPL-MCNC: 0.8 NG/ML (ref 1.3–31.8)
THYROGLOB SERPL-MCNC: ABNORMAL NG/ML (ref 1.3–31.8)

## 2020-02-04 DIAGNOSIS — G47.00 INSOMNIA, UNSPECIFIED TYPE: ICD-10-CM

## 2020-02-04 RX ORDER — ZOLPIDEM TARTRATE 10 MG/1
10 TABLET ORAL NIGHTLY PRN
Qty: 30 TAB | Refills: 0 | Status: SHIPPED
Start: 2020-02-04 | End: 2020-03-03 | Stop reason: SDUPTHER

## 2020-02-04 NOTE — TELEPHONE ENCOUNTER
Narxcheck Logo   ALBA KENT      Age: 54   demographics  Data as of: 02/04/2020                       NARCOTIC  100      SEDATIVE  220      STIMULANT  000      NARxSCORES can range from 000 to 999. The first two digits represent the composite percentile risk based on an overall analysis of prescription drug use. The third digit represents the number of active prescriptions. The distribution of scores in the population is such that approximately 75% fall below 200, 95% fall below 500 and 99% fall below 650. The information on this report is not warranted as accurate or complete. This report is based on the search criteria supplied and the data entered by the dispensing pharmacy. For more information about any prescription, please contact the dispensing pharmacy or the prescriber. NARxSCORES and Reports are intended to aid, not replace medical decision making. None of the information presented should be used as sole justification for providing or refusing to provide medications.       [id number]%[id number]        Rx Graph    Grayed out drugs could not be included in score calculations.      Narcotic    Buprenorphine    Sedative    Stimulant    Other        All PrescribersPrescribers2 - Sussy CAMPOS [place]1 - Rebekah [place] PpyaOxvrjlai66/[id number]      Morphine MgEq (MME)    [id number]/[id number]      Buprenorphine mg    [id number]/120b3e6t1s      Lorazepam MgEq (LME)    276926Idupwbsz22/103q1v9c5n      *Per CDC guidance, the MME conversion factors prescribed or provided as part of the medication-assisted treatment for opioid use disorder should not be used to benchmark against dosage thresholds meant for opioids prescribed for pain. Buprenorphine products have no agreed upon morphine equivalency, and as partial opioid agonists, are not expected to be associated with overdose risk in the same dose-dependent manner as doses for full agonist opioids. MME = morphine milligram equivalents. LME  = Lorazepam milligram equivalents. MG = dose in milligrams.         Data Analysis                                                                                                                                                                                   Summary      Total Prescriptions:   17      Total Prescribers:   2      Total Pharmacies:   2        Narcotics* (excluding Buprenorphine)      Current Qty:   0     Current MME/day:   0.00     30 Day Avg MME/day:   0.00       Buprenorphine*      Current Qty:   0     Current mg/day:   0.00     30 Day Avg mg/day:   0.00       Sedatives*      Current Qty:   0     Current LME/day:   0.00     30 Day Avg LME/day:   0.47         *Highlighted drugs could not be included in score calculations     Prescriptions       Total Prescriptions: 17       Total Private Pay: 0        Fill Date     ID      Written    Drug    Qty    Days    Prescriber    Rx #    Pharmacy     Refill      Daily Dose*    Pymt Type             01/03/2020  3   01/03/2020      Zolpidem Tartrate 10 MG Tablet        30.00 30  Be Gal   [id number]    [facility] (7470)   0  0.50 L[place]   12/02/2019  3   12/02/2019      Zolpidem Tartrate 10 MG Tablet        30.00 30  Be Gal   [id number]    [facility] (6770)   0  0.50 L[place]   10/21/2019  3   10/21/2019      Zolpidem Tartrate 10 MG Tablet        30.00 30  Be Gal   [id number]    [facility] (7470)   0  0.50 L[place]   09/09/2019  3   09/09/2019      Zolpidem Tartrate 10 MG Tablet        30.00 30  Be Gal   [id number]    [facility] (7470)   0  0.50 L[place]   08/06/2019  3   08/06/2019      Zolpidem Tartrate 10 MG Tablet        30.00 30  Be Gal   [id number]    [facility] (7470)   0  0.50 L[place]   06/25/2019  3   06/25/2019      Zolpidem Tartrate 10 MG Tablet        30.00 30  Me War   [id number]    [facility] (7470)   0  0.50 L[place]   04/30/2019  3   04/30/2019      Zolpidem Tartrate 10 MG Tablet        30.00 30  Be Gal   [id number]    [facility] (1470)   0  0.50 L[place]   03/27/2019  3   03/27/2019      Zolpidem Tartrate 10 MG Tablet        30.00 30  Be Gal   [id number]    [facility] (7470)   0  0.50 L[place]   02/19/2019  3   02/19/2019      Zolpidem Tartrate 10 MG Tablet        30.00 30  Be Gal   [id number]    [facility] (7470)   0  0.50 L[place]   01/14/2019  3   01/14/2019      Zolpidem Tartrate 10 MG Tablet        30.00 30  Be Gal   [id number]    [facility] (7470)   0  0.50 L[place]   11/27/2018  3   11/27/2018      Zolpidem Tartrate 10 MG Tablet        30.00 30  Be Gal   [id number]    [facility] (7470)   0  0.50 L[place]   10/22/2018  3   10/22/2018      Zolpidem Tartrate 10 MG Tablet        30.00 30  Be Gal   [id number]    [facility] (7470)   0  0.50 LME  [place]   09/11/2018  2   09/11/2018      Zolpidem Tartrate 10 MG Tablet        30.00 30  Be Gal   [id number]    [facility] (7470)   0  0.50 LME  [place]   07/31/2018  1   07/31/2018      Zolpidem Tartrate 10 MG Tablet        30.00 30  Be Gal   [id number]    [facility] (0492)   0  0.50 LME  [place]   06/11/2018  1   06/11/2018      Zolpidem Tartrate 10 MG Tablet        30.00 30  Be Gal   [id number]    [facility] (0492)   0  0.50 LME  [place]   05/02/2018  1   05/02/2018      Zolpidem Tartrate 10 MG Tablet        30.00 30  Be Gal   [id number]    [facility] (0492)   0  0.50 LME  [place]   03/27/2018  1   03/27/2018      Zolpidem Tartrate 10 MG Tablet        30.00 30  Me War   [id number]    [facility] (0492)   0  0.50 LME  [place]     *Per CDC guidance, the MME conversion factors prescribed or provided as part of the medication-assisted treatment for opioid use disorder should not be used to benchmark against dosage thresholds meant for opioids prescribed for pain. Buprenorphine products have no agreed upon morphine equivalency, and as partial opioid agonists, are not expected to be associated with overdose risk in the same dose-dependent manner as doses for full agonist opioids. MME = morphine milligram equivalents. LME = Lorazepam milligram  equivalents. MG = dose in milligrams.         Providers    Total Providers: 2       Name    Address    City    State    Zipcode    Phone      Sussy ANDRES Tan    [address]      [place] [address]    Rebekah Calix    [address]      [place] [address]          Pharmacies    Total Pharmacies: 2       Name    Address    City    State    Zipcode    Phone      [facility] [phone]  [facility] [id number]   [facility] [address] [phone]   [place] [phone]) [address]   [facility] [address] [phone]

## 2020-02-18 ENCOUNTER — HOSPITAL ENCOUNTER (OUTPATIENT)
Dept: RADIOLOGY | Facility: MEDICAL CENTER | Age: 55
End: 2020-02-18
Attending: FAMILY MEDICINE
Payer: COMMERCIAL

## 2020-02-18 ENCOUNTER — OFFICE VISIT (OUTPATIENT)
Dept: MEDICAL GROUP | Facility: PHYSICIAN GROUP | Age: 55
End: 2020-02-18
Payer: COMMERCIAL

## 2020-02-18 VITALS
HEIGHT: 66 IN | OXYGEN SATURATION: 96 % | SYSTOLIC BLOOD PRESSURE: 130 MMHG | HEART RATE: 77 BPM | WEIGHT: 191.14 LBS | DIASTOLIC BLOOD PRESSURE: 90 MMHG | TEMPERATURE: 98.3 F | BODY MASS INDEX: 30.72 KG/M2

## 2020-02-18 DIAGNOSIS — M25.561 ACUTE PAIN OF RIGHT KNEE: ICD-10-CM

## 2020-02-18 PROCEDURE — 73564 X-RAY EXAM KNEE 4 OR MORE: CPT | Mod: RT

## 2020-02-18 PROCEDURE — 99213 OFFICE O/P EST LOW 20 MIN: CPT | Performed by: FAMILY MEDICINE

## 2020-02-18 ASSESSMENT — PATIENT HEALTH QUESTIONNAIRE - PHQ9: CLINICAL INTERPRETATION OF PHQ2 SCORE: 0

## 2020-02-18 NOTE — PROGRESS NOTES
"Subjective:      Don Olivas Jr. is a 54 y.o. male who presents with Pain (right knee outside pain)        HPI:    The patient is here for evaluation of right knee pain.    Acute pain of right knee  Patient states he has had right knee pain for one month now located in the medial joint line. States he woke up in the morning at the time with the pain. Denies any recent trauma or specific triggering factors. Denies doing more exertional/physical activity than usual lately. However, he reports that he stands at work for 11 hours straight on a reach machine at his job. States the knee pain does not occur when he is standing or walking. He therefore does not have the pain when he is working. The pain is noted when he is sitting or laying down when there is pressure on the knee by the opposite knee. He has been taking Meloxicam 15 mg without significant improvement of the pain. Denies limited range of movement of the knee.    Past medical history, past surgical history, family history reviewed-no changes    Social history reviewed-no changes    Allergies reviewed-no changes    Medications reviewed-no changes     ROS:  As per the HPI as shown above, the rest are negative.       Objective:     /90 (BP Location: Left arm, Patient Position: Sitting, BP Cuff Size: Adult)   Pulse 77   Temp 36.8 °C (98.3 °F) (Temporal)   Ht 1.676 m (5' 6\")   Wt 86.7 kg (191 lb 2.2 oz)   SpO2 96%   BMI 30.85 kg/m²     Physical Exam  Examined alert, awake, oriented, not in distress    Extremities-no edema, clubbing, cyanosis. Right knee with marked tenderness at the distal-most portion of the medial joint line, no swelling, no effusion, there is clicking on passive flexion and extension, no joint laxity or instability       Assessment/Plan:     1. Acute pain of right knee  - Ordered xray to further evaluate the knee. Informed patient that the pain may be caused by arthritis. Supportive care instructions and return " precautions given. Advised doing warm compresses to the knee for 15 minutes 3 times per day. He can continue taking the Meloxicam as needed. Discussed the possibility of treating the area with steroid injection depending on x-ray results. Patient states he is willing to do steroid injections if needed.  - DX-KNEE COMPLETE 4+ RIGHT; Future     I, Pepe Francis (Scribe), am scribing for, and in the presence of, Rebekah Calix MD     Electronically signed by: Pepe Francis (Scribe), 2/18/2020    I, Rebekah Calix MD personally performed the services described in this documentation, as scribed by Pepe Francis in my presence, and it is both accurate and complete.

## 2020-02-24 ENCOUNTER — OFFICE VISIT (OUTPATIENT)
Dept: MEDICAL GROUP | Facility: PHYSICIAN GROUP | Age: 55
End: 2020-02-24
Payer: COMMERCIAL

## 2020-02-24 VITALS
OXYGEN SATURATION: 95 % | BODY MASS INDEX: 30.19 KG/M2 | HEIGHT: 66 IN | HEART RATE: 81 BPM | SYSTOLIC BLOOD PRESSURE: 130 MMHG | WEIGHT: 187.83 LBS | DIASTOLIC BLOOD PRESSURE: 80 MMHG | TEMPERATURE: 98.6 F

## 2020-02-24 DIAGNOSIS — M17.11 PRIMARY OSTEOARTHRITIS OF RIGHT KNEE: ICD-10-CM

## 2020-02-24 PROCEDURE — 20610 DRAIN/INJ JOINT/BURSA W/O US: CPT | Performed by: FAMILY MEDICINE

## 2020-02-24 RX ORDER — TRIAMCINOLONE ACETONIDE 40 MG/ML
80 INJECTION, SUSPENSION INTRA-ARTICULAR; INTRAMUSCULAR ONCE
Status: COMPLETED | OUTPATIENT
Start: 2020-02-24 | End: 2020-02-24

## 2020-02-24 RX ADMIN — TRIAMCINOLONE ACETONIDE 80 MG: 40 INJECTION, SUSPENSION INTRA-ARTICULAR; INTRAMUSCULAR at 10:44

## 2020-02-24 NOTE — LETTER
February 24, 2020         Patient: Don Olivas Jr.   YOB: 1965   Date of Visit: 2/24/2020           To Whom it May Concern:    Don Olivas was seen in my clinic on 2/24/2020. He is recommended to sit most of the time in a twelve hour shift for total of 2 weeks.    If you have any questions or concerns, please don't hesitate to call.        Sincerely,           Rebekah Calix M.D.  Electronically Signed

## 2020-02-24 NOTE — PROGRESS NOTES
"Subjective:      Don Olivas Jr. is a 54 y.o. male who presents with Injections (right Knee)            HPI     Patient is here for intra-articular steroid injection of the right knee for right knee pain due to osteoarthritis.  Right knee pain has been ongoing for a month now located in the medial joint line.  Patient is on his feet at work during the entire 12-hour shift.  There has been no trauma.  X-ray done of the right knee last week came back no fracture or dislocation with minor degenerative change superior pole of the patella with spurring of the patella and tibial spines.    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  He is asking for a letter for this accommodation.    Past medical history, past surgical history, family history reviewed-no changes    Social history reviewed-no changes    Allergies reviewed-no changes    Medications reviewed-no changes    ROS     As per HPI.       Objective:     /80 (BP Location: Left arm, Patient Position: Sitting, BP Cuff Size: Adult)   Pulse 81   Temp 37 °C (98.6 °F) (Temporal)   Ht 1.676 m (5' 6\")   Wt 85.2 kg (187 lb 13.3 oz)   SpO2 95%   BMI 30.32 kg/m²      Physical Exam     Examined alert, awake, oriented, not in distress.            Assessment/Plan:       1. Primary osteoarthritis of right knee  Landmarks were identified.  I used a superior lateral approach.  Area cleansed with Betadine sticks x3.  I injected 3 cc of 2% lidocaine, 2 cc of 0.5% Marcaine, 2 cc of 40 mg/mL of Kenalog intra-articularly.  Patient tolerated the procedure well.  Posttreatment instructions given.  Advised not to do any prolonged walk, going up and down steps or stairs in the next 48 hours.  Advised to ice the knee for 15 minutes when he comes home.  He was given a work note for accommodation doing a sitting job almost all the time in a 12-hour shift for the next 2 weeks.  He will " keep his regular appointment to see me in 1 month.  - triamcinolone acetonide (KENALOG-40) injection 80 mg

## 2020-03-23 RX ORDER — MELOXICAM 15 MG/1
TABLET ORAL
Qty: 30 TAB | Refills: 1 | Status: SHIPPED | OUTPATIENT
Start: 2020-03-23 | End: 2020-07-10

## 2020-03-24 ENCOUNTER — OFFICE VISIT (OUTPATIENT)
Dept: MEDICAL GROUP | Facility: PHYSICIAN GROUP | Age: 55
End: 2020-03-24
Payer: COMMERCIAL

## 2020-03-24 VITALS
BODY MASS INDEX: 30.29 KG/M2 | HEIGHT: 66 IN | WEIGHT: 188.49 LBS | TEMPERATURE: 97 F | DIASTOLIC BLOOD PRESSURE: 80 MMHG | OXYGEN SATURATION: 98 % | HEART RATE: 74 BPM | SYSTOLIC BLOOD PRESSURE: 124 MMHG

## 2020-03-24 DIAGNOSIS — M17.11 PRIMARY OSTEOARTHRITIS OF RIGHT KNEE: ICD-10-CM

## 2020-03-24 DIAGNOSIS — E78.5 DYSLIPIDEMIA: ICD-10-CM

## 2020-03-24 DIAGNOSIS — E89.0 POSTSURGICAL HYPOTHYROIDISM: ICD-10-CM

## 2020-03-24 DIAGNOSIS — C73 PAPILLARY CARCINOMA OF THYROID (HCC): ICD-10-CM

## 2020-03-24 DIAGNOSIS — I10 ESSENTIAL HYPERTENSION: ICD-10-CM

## 2020-03-24 PROCEDURE — 99214 OFFICE O/P EST MOD 30 MIN: CPT | Performed by: FAMILY MEDICINE

## 2020-03-24 ASSESSMENT — FIBROSIS 4 INDEX: FIB4 SCORE: 1.44

## 2020-03-24 NOTE — PROGRESS NOTES
"Subjective:      Don Olivas Jr. is a 54 y.o. male who presents with Follow-Up      HPI:    The patient is here for follow up on his chronic medical problems.    Primary osteoarthritis of right knee  This is an ongoing chronic issue for the patient. During his last office visit 1 month ago on 2/24/2020, he received an intra-articular steroid Injection in the right knee for osteoarthritis.  Patient states that the injection did not work. He has started taking OTC Osteo Bi-Flex glucosamine, 2 tablets nightly and reports this has been improving his pain and he is able to sleep better at night.  He however experiences the pain again in the morning when he is working.    Essential Hypertension  He takes losartan 25 mg for his hypertension without any side effects. Blood pressure in the office today is within goal at 124/80.      Dyslipidemia  He has been taking atorvastatin 20 mg as prescribed for his dyslipidemia without myalgias or other side effects. Last blood work in 9/2019 showed all values within normal limits.    Papillary carcinoma of thyroid (HCC)  Postsurgical hypothyroidism  The patient is s/p total thyroidectomy in 20007. He follows up with Dr. Hyatt (endocrinolgy) and last followed up on 1/7/2020. He continues to take levothyroxine 100 mcg as prescribed by Dr. Hyatt. His last lab work ordered by Dr. Hyatt on 1/6/2020, showed his thyroid was adequately replaced at 1.560. No medication changes were made at the time.      Past medical history, past surgical history, family history reviewed-no changes    Social history reviewed-no changes    Allergies reviewed-no changes    Medications reviewed-no changes     ROS:  As per the HPI as shown above, the rest are negative.       Objective:     /80 (BP Location: Left arm, Patient Position: Sitting, BP Cuff Size: Adult)   Pulse 74   Temp 36.1 °C (97 °F) (Temporal)   Ht 1.676 m (5' 6\")   Wt 85.5 kg (188 lb 7.9 oz)   SpO2 98%   BMI 30.42 " kg/m²     Physical Exam  Examined alert, awake, oriented, not in distress    Neck-supple, no lymphadenopathy, no thyromegaly  Lungs-clear to auscultation, no rales, no wheezes  Heart-regular rate and rhythm, no murmur  Extremities-no edema, clubbing, cyanosis      Labs:  Hospital Outpatient Visit on 01/06/2020   Component Date Value Ref Range Status   • 25-Hydroxy   Vitamin D 25 01/06/2020 48  30 - 100 ng/mL Final    Comment: Adult Ranges:   <20 ng/mL - Deficiency  20-29 ng/mL - Insufficiency   ng/mL - Sufficiency  The [employer]ia Centaur Vitamin D Assay is standardized to the  [facility] reference measurement procedures, a  reference method for the Vitamin D Standardization Program  (VDSP).  The VDSP aligns patient results among 25 (OH)  Vitamin D methods.     • Thyroglobulin 01/06/2020 0.8* 1.3 - 31.8 ng/mL Final    Comment: INTERPRETIVE INFORMATION: Thyroglobulin, Serum or Plasma  Specimens negative for thyroglobulin antibodies (TgAb) are tested  for thyroglobulin (Tg) by chemiluminescent immunoassay (SCAR) using  the Dereck eMithilaHaat Access DxI method. Specimens with TgAb results  above the upper reference limit are tested for Tg by  high-performance liquid chromatography-tandem mass spectrometry  (LC-MS/MS). Results obtained with different test methods or kits  cannot be used interchangeably. Tg results, regardless of  concentration, should not be interpreted as absolute evidence for  the presence or absence of papillary or follicular thyroid cancer.  Tg testing is not recommended for use as a screening procedure to  detect the presence of thyroid cancer in the general population.     • Anti-Thyroglobulin 01/06/2020 <0.9  0.0 - 4.0 IU/mL Final    Comment: INTERPRETIVE INFORMATION: Thyroglobulin Antibody  A value of 4.0 IU/mL or less indicates a negative result for  thyroglobulin antibodies.  The Thyroglobulin Antibody assay is being performed using the  Dereck eMithila[employer]at Access DxI method.     • Thyroglobulin  by LC-MC/MS-S/P 01/06/2020 Not Applicable  1.3 - 31.8 ng/mL Final    Comment: INTERPRETIVE INFORMATION: Thyroglobulin by LC-MS/MS, Serum/Plasma  Lower limit of detection for Thyroglobulin by LC-MS/MS is 0.5  ng/mL.  Test developed and characteristics determined by [facility]. See Compliance Statement B: [employer]/[facility]  Performed by [facility],  [address] [place] [place] [address] [phone]  www.[employer], Bobo Lanza MD, Lab. Director     • Free T-4 01/06/2020 0.86  0.53 - 1.43 ng/dL Final   • T3,Free 01/06/2020 3.29  2.40 - 4.20 pg/mL Final   • T3 01/06/2020 115.1  60.0 - 181.0 ng/dL Final   • TSH 01/06/2020 1.560  0.380 - 5.330 uIU/mL Final    Comment: Please note new reference ranges effective 12/14/2017 10:00 AM  Pregnant Females, 1st Trimester  0.050-3.700  Pregnant Females, 2nd Trimester  0.310-4.350  Pregnant Females, 3rd Trimester  0.410-5.180          Assessment/Plan:     1. Primary osteoarthritis of right knee  This is an ongoing chronic issue for the patient.  No relief of the pain with the steroid injection he received 1 month ago during his last office visit with me. Since then he started taking OTC glucosamine tablets, 2 tablets nightly, with good results. I recommended taking the glucosamine tablets once in the morning and again in the evening to control his pain throughout the day. Due to improved pain results with the glucosamine tablets, we will hold off on an MRI at this time.  He will let me know if he starts having increase in the pain again and if he wants to proceed with MRI prior to his next follow-up in 6 months.  - Lipid Profile; Future  - Comp Metabolic Panel; Future  - CBC WITH DIFFERENTIAL; Future    2. Essential hypertension  Blood pressure is stable and within goal on his current regimen. We will continue the current dosages. I have advised him to avoid salty foods and exercise regularly.   - Lipid Profile; Future  - Comp Metabolic Panel; Future  - CBC WITH  DIFFERENTIAL; Future    3. Dyslipidemia  His last lab work in 9/2019 showed all lipid panel values were within goal. He is due for updated lab work. Stable on current atorvastatin dosage. I have advised the patient to increase his exercise regimen and avoid fatty foods.  Advised to do the blood work this month.  - Lipid Profile; Future  - Comp Metabolic Panel; Future  - CBC WITH DIFFERENTIAL; Future    4. Papillary carcinoma of thyroid (HCC)  Patient is s/p total thyroidectomy in 2007. He is closely followed by Dr. Hyatt (endocrinology) and last followed up on 1/7/2020. Patient is to continue follow up with Dr. Hyatt.     5. Postsurgical hypothyroidism  Stable on thyroid replacement medications as prescribed by Dr. Hyatt. TSH is adequately replaced at 1.560. No changes were made at the time. Patient is to continue follow up with Dr. Hyatt.       Tony DOWNEY (Scribe), am scribing for, and in the presence of, Rebekah Calix MD     Electronically signed by: Tony Loyd (Rajanibe), 3/24/2020    Rebekah DOWNEY MD personally performed the services described in this documentation, as scribed by Tony Loyd in my presence, and it is both accurate and complete.

## 2020-03-30 ENCOUNTER — HOSPITAL ENCOUNTER (OUTPATIENT)
Dept: LAB | Facility: MEDICAL CENTER | Age: 55
End: 2020-03-30
Attending: FAMILY MEDICINE
Payer: COMMERCIAL

## 2020-03-30 DIAGNOSIS — E78.5 DYSLIPIDEMIA: ICD-10-CM

## 2020-03-30 DIAGNOSIS — M17.11 PRIMARY OSTEOARTHRITIS OF RIGHT KNEE: ICD-10-CM

## 2020-03-30 DIAGNOSIS — I10 ESSENTIAL HYPERTENSION: ICD-10-CM

## 2020-03-30 LAB
ALBUMIN SERPL BCP-MCNC: 4.5 G/DL (ref 3.2–4.9)
ALBUMIN/GLOB SERPL: 1.7 G/DL
ALP SERPL-CCNC: 73 U/L (ref 30–99)
ALT SERPL-CCNC: 29 U/L (ref 2–50)
ANION GAP SERPL CALC-SCNC: 10 MMOL/L (ref 7–16)
AST SERPL-CCNC: 26 U/L (ref 12–45)
BASOPHILS # BLD AUTO: 1.2 % (ref 0–1.8)
BASOPHILS # BLD: 0.09 K/UL (ref 0–0.12)
BILIRUB SERPL-MCNC: 0.6 MG/DL (ref 0.1–1.5)
BUN SERPL-MCNC: 18 MG/DL (ref 8–22)
CALCIUM SERPL-MCNC: 8.4 MG/DL (ref 8.5–10.5)
CHLORIDE SERPL-SCNC: 102 MMOL/L (ref 96–112)
CHOLEST SERPL-MCNC: 143 MG/DL (ref 100–199)
CO2 SERPL-SCNC: 28 MMOL/L (ref 20–33)
CREAT SERPL-MCNC: 1.19 MG/DL (ref 0.5–1.4)
EOSINOPHIL # BLD AUTO: 0.18 K/UL (ref 0–0.51)
EOSINOPHIL NFR BLD: 2.3 % (ref 0–6.9)
ERYTHROCYTE [DISTWIDTH] IN BLOOD BY AUTOMATED COUNT: 42.7 FL (ref 35.9–50)
GLOBULIN SER CALC-MCNC: 2.7 G/DL (ref 1.9–3.5)
GLUCOSE SERPL-MCNC: 88 MG/DL (ref 65–99)
HCT VFR BLD AUTO: 47.7 % (ref 42–52)
HDLC SERPL-MCNC: 52 MG/DL
HGB BLD-MCNC: 16.2 G/DL (ref 14–18)
IMM GRANULOCYTES # BLD AUTO: 0.04 K/UL (ref 0–0.11)
IMM GRANULOCYTES NFR BLD AUTO: 0.5 % (ref 0–0.9)
LDLC SERPL CALC-MCNC: 68 MG/DL
LYMPHOCYTES # BLD AUTO: 1.63 K/UL (ref 1–4.8)
LYMPHOCYTES NFR BLD: 21.2 % (ref 22–41)
MCH RBC QN AUTO: 32 PG (ref 27–33)
MCHC RBC AUTO-ENTMCNC: 34 G/DL (ref 33.7–35.3)
MCV RBC AUTO: 94.1 FL (ref 81.4–97.8)
MONOCYTES # BLD AUTO: 0.7 K/UL (ref 0–0.85)
MONOCYTES NFR BLD AUTO: 9.1 % (ref 0–13.4)
NEUTROPHILS # BLD AUTO: 5.04 K/UL (ref 1.82–7.42)
NEUTROPHILS NFR BLD: 65.7 % (ref 44–72)
NRBC # BLD AUTO: 0 K/UL
NRBC BLD-RTO: 0 /100 WBC
PLATELET # BLD AUTO: 169 K/UL (ref 164–446)
PMV BLD AUTO: 10.6 FL (ref 9–12.9)
POTASSIUM SERPL-SCNC: 4.5 MMOL/L (ref 3.6–5.5)
PROT SERPL-MCNC: 7.2 G/DL (ref 6–8.2)
RBC # BLD AUTO: 5.07 M/UL (ref 4.7–6.1)
SODIUM SERPL-SCNC: 140 MMOL/L (ref 135–145)
TRIGL SERPL-MCNC: 115 MG/DL (ref 0–149)
WBC # BLD AUTO: 7.7 K/UL (ref 4.8–10.8)

## 2020-03-30 PROCEDURE — 85025 COMPLETE CBC W/AUTO DIFF WBC: CPT

## 2020-03-30 PROCEDURE — 80061 LIPID PANEL: CPT

## 2020-03-30 PROCEDURE — 80053 COMPREHEN METABOLIC PANEL: CPT

## 2020-03-30 PROCEDURE — 36415 COLL VENOUS BLD VENIPUNCTURE: CPT

## 2020-05-06 ENCOUNTER — TELEPHONE (OUTPATIENT)
Dept: MEDICAL GROUP | Facility: PHYSICIAN GROUP | Age: 55
End: 2020-05-06

## 2020-05-06 NOTE — TELEPHONE ENCOUNTER
Have patient monitor blood pressure for the next 3 to 5 days and if they are persistently elevated 140/90 or higher he needs to call us so I can increase the dose of his blood pressure medication.  Avoid salty foods, try to exercise regularly.

## 2020-05-06 NOTE — TELEPHONE ENCOUNTER
Addended by: JOLENE VENTURA on: 10/2/2018 04:59 PM     Modules accepted: Orders, Level of Service     Pt called and his B/P is high 144/116, please advise

## 2020-05-11 ENCOUNTER — APPOINTMENT (OUTPATIENT)
Dept: MEDICAL GROUP | Facility: PHYSICIAN GROUP | Age: 55
End: 2020-05-11
Payer: COMMERCIAL

## 2020-05-13 ENCOUNTER — OFFICE VISIT (OUTPATIENT)
Dept: MEDICAL GROUP | Facility: PHYSICIAN GROUP | Age: 55
End: 2020-05-13
Payer: COMMERCIAL

## 2020-05-13 VITALS
HEIGHT: 65 IN | WEIGHT: 188.2 LBS | DIASTOLIC BLOOD PRESSURE: 92 MMHG | HEART RATE: 81 BPM | SYSTOLIC BLOOD PRESSURE: 130 MMHG | BODY MASS INDEX: 31.36 KG/M2 | OXYGEN SATURATION: 96 % | TEMPERATURE: 97.8 F | RESPIRATION RATE: 16 BRPM

## 2020-05-13 DIAGNOSIS — G47.00 INSOMNIA, UNSPECIFIED TYPE: ICD-10-CM

## 2020-05-13 DIAGNOSIS — E78.5 DYSLIPIDEMIA: ICD-10-CM

## 2020-05-13 DIAGNOSIS — E55.9 VITAMIN D DEFICIENCY: ICD-10-CM

## 2020-05-13 DIAGNOSIS — I10 ESSENTIAL HYPERTENSION: ICD-10-CM

## 2020-05-13 DIAGNOSIS — Z12.5 SCREENING FOR PROSTATE CANCER: ICD-10-CM

## 2020-05-13 PROCEDURE — 99214 OFFICE O/P EST MOD 30 MIN: CPT | Performed by: FAMILY MEDICINE

## 2020-05-13 RX ORDER — LOSARTAN POTASSIUM 50 MG/1
50 TABLET ORAL DAILY
Qty: 90 TAB | Refills: 1 | Status: SHIPPED | OUTPATIENT
Start: 2020-05-13 | End: 2020-09-21

## 2020-05-13 RX ORDER — ZOLPIDEM TARTRATE 10 MG/1
10 TABLET ORAL NIGHTLY PRN
Qty: 30 TAB | Refills: 0 | Status: SHIPPED | OUTPATIENT
Start: 2020-05-13 | End: 2020-06-17 | Stop reason: SDUPTHER

## 2020-05-13 RX ORDER — ZOLPIDEM TARTRATE 10 MG/1
10 TABLET ORAL NIGHTLY PRN
COMMUNITY
End: 2020-05-13

## 2020-05-13 ASSESSMENT — FIBROSIS 4 INDEX: FIB4 SCORE: 1.57

## 2020-05-13 NOTE — PROGRESS NOTES
Subjective:      Tom Olivas Jr. is a 54 y.o. male who presents with Pain (neck ) and Hypertension Follow-up            HPI     Patient is here because he has noticed that his blood pressure has been running high for the past 3 weeks.  He said he has been forloughed from his job due to the coronavirus pandemic and he just went back to work about 2 days ago.  He said when he was not working he was not watching his diet well and he was not active.  He said home blood pressure readings have been running from 120s to 140s over 80s to 100s with associated pain at the back of his neck.  He denies any chest pain, palpitations, shortness of breath, leg edema.  He has been taking losartan 25 mg 1 tablet daily for hypertension and previously his blood pressure has been running well and under control.    He also wants me to look at all his supplements to make sure that there are no problems taking them.  He takes multivitamins for men, calcium, vitamin D, Osteo Bi-Flex.  His total vitamin D intake per day from all the supplements he is taking is 5500 international units.  His last vitamin D level was normal at 48 in January 2020.  At that time he was not taking this much amount of supplements.    He also needs a refill of zolpidem that he takes for his insomnia.  This is working well for him.  He said the last time he took the medication was last night.  The last urine drug screen was in September 2019 that came back negative for zolpidem and no illegal drugs or recreational drugs detected.  Most likely the reason for the negative zolpidem was because he did not take it few days before he did a drug screen.    Past medical history, past surgical history, family history reviewed-no changes    Social history reviewed-no changes    Allergies reviewed-no changes    Medications reviewed-no changes    ROS     As per HPI, the rest are negative.       Objective:     /92 (BP Location: Left arm, Patient Position: Sitting,  "BP Cuff Size: Adult)   Pulse 81   Temp 36.6 °C (97.8 °F) (Temporal)   Resp 16   Ht 1.651 m (5' 5\")   Wt 85.4 kg (188 lb 3.2 oz)   SpO2 96%   BMI 31.32 kg/m²      Physical Exam     Examined alert, awake, oriented, not in distress    Neck-supple, no lymphadenopathy, no thyromegaly  Lungs-clear to auscultation, no rales, no wheezes  Heart-regular rate and rhythm, no murmur  Extremities-no edema, clubbing, cyanosis            Assessment/Plan:       1. Essential hypertension  We will increase the losartan from 25 to 50 mg daily.  He can take 2 tablets of the 25 mg daily until he is finished with his supply.  Advised to take his blood pressure medication at night.  New prescription sent to the pharmacy for losartan 50 mg to be taken 1 tablet daily.  Monitor blood pressure at home and keep a record and send me the readings through my chart in 1 week.  Advised low-salt diet, regular exercise and keeping a healthy weight.  - losartan (COZAAR) 50 MG Tab; Take 1 Tab by mouth every day.  Dispense: 90 Tab; Refill: 1  - Lipid Profile; Future  - Comp Metabolic Panel; Future  - CBC WITH DIFFERENTIAL; Future  - VITAMIN D,25 HYDROXY; Future  - PROSTATE SPECIFIC AG SCREENING; Future    2. Vitamin D deficiency  Advised to stop taking the separate vitamin D supplement 2000 IUs because he is already taking a lot of vitamin D in 1 day.  We will do follow-up vitamin D level next visit.  - Lipid Profile; Future  - Comp Metabolic Panel; Future  - CBC WITH DIFFERENTIAL; Future  - VITAMIN D,25 HYDROXY; Future  - PROSTATE SPECIFIC AG SCREENING; Future    3. Insomnia, unspecified type   I refilled his zolpidem.  Up-to-date with urine drug screen and controlled substance treatment agreement.  - zolpidem (AMBIEN) 10 MG Tab; Take 1 Tab by mouth at bedtime as needed for Sleep for up to 30 days.  Dispense: 30 Tab; Refill: 0  - Lipid Profile; Future  - Comp Metabolic Panel; Future  - CBC WITH DIFFERENTIAL; Future  - VITAMIN D,25 HYDROXY; " Future  - PROSTATE SPECIFIC AG SCREENING; Future    4. Dyslipidemia  We will do follow-up lipid panel next visit.  - Lipid Profile; Future  - Comp Metabolic Panel; Future  - CBC WITH DIFFERENTIAL; Future  - VITAMIN D,25 HYDROXY; Future  - PROSTATE SPECIFIC AG SCREENING; Future    5. Screening for prostate cancer  We will do PSA next visit.  - Lipid Profile; Future  - Comp Metabolic Panel; Future  - CBC WITH DIFFERENTIAL; Future  - VITAMIN D,25 HYDROXY; Future  - PROSTATE SPECIFIC AG SCREENING; Future    He will keep his appointment in September 2020.  We will communicate through FAD ? IO with regards to his blood pressure readings.  We will adjust medication dose depending on his response.      Please note that this dictation was created using voice recognition software. I have worked with consultants from the vendor as well as technical experts from [employer] to optimize the interface. I have made every reasonable attempt to correct obvious errors, but I expect that there are errors of grammar and possibly content I did not discover before finalizing the note.

## 2020-06-17 DIAGNOSIS — G47.00 INSOMNIA, UNSPECIFIED TYPE: ICD-10-CM

## 2020-06-17 RX ORDER — ZOLPIDEM TARTRATE 10 MG/1
10 TABLET ORAL NIGHTLY PRN
Qty: 30 TAB | Refills: 0 | Status: SHIPPED | OUTPATIENT
Start: 2020-06-17 | End: 2020-07-19 | Stop reason: SDUPTHER

## 2020-06-17 NOTE — TELEPHONE ENCOUNTER
Received request via: Pharmacy    Was the patient seen in the last year in this department? Yes    Does the patient have an active prescription (recently filled or refills available) for medication(s) requested? Malini KENT      Age: 54   demographics  Data as of: 06/17/2020                       NARCOTIC  100      SEDATIVE  220      STIMULANT  000      NARxSCORES can range from 000 to 999. The first two digits represent the composite percentile risk based on an overall analysis of prescription drug use. The third digit represents the number of active prescriptions. The distribution of scores in the population is such that approximately 75% fall below 200, 95% fall below 500 and 99% fall below 650. The information on this report is not warranted as accurate or complete. This report is based on the search criteria supplied and the data entered by the dispensing pharmacy. For more information about any prescription, please contact the dispensing pharmacy or the prescriber. NARxSCORES and Reports are intended to aid, not replace medical decision making. None of the information presented should be used as sole justification for providing or refusing to provide medications.       [id number]        Rx Graph    Grayed out drugs could not be included in score calculations.      Narcotic    Buprenorphine    Sedative    Stimulant    Other        All PrescribersPrescribers2 - Rebekah Lindquist Gald1 - Sussy L [id number]      Morphine MgEq (MME)    [id number]      Buprenorphine mg    [id number]      Lorazepam MgEq (LME)    [id number]      *Per CDC guidance, the MME conversion factors prescribed or provided as part of the medication-assisted treatment for opioid use disorder should not be used to benchmark against dosage thresholds meant for opioids prescribed for pain. Buprenorphine products have no agreed upon morphine  equivalency, and as partial opioid agonists, are not expected to be associated with overdose risk in the same dose-dependent manner as doses for full agonist opioids. MME = morphine milligram equivalents. LME = Lorazepam milligram equivalents. MG = dose in milligrams.         Data Analysis                                                                                                                                                                                   Summary      Total Prescriptions:   18      Total Prescribers:   2      Total Pharmacies:   2        Narcotics* (excluding Buprenorphine)      Current Qty:   0     Current MME/day:   0.00     30 Day Avg MME/day:   0.00       Sedatives*      Current Qty:   0     Current LME/day:   0.00     30 Day Avg LME/day:   0.42       Buprenorphine*      Current Qty:   0     Current mg/day:   0.00     30 Day Avg mg/day:   0.00         *Highlighted drugs could not be included in score calculations     Prescriptions       Total Prescriptions: 18       Total Private Pay: 0        Fill Date     ID      Written    Drug    Qty    Days    Prescriber    Rx #    Pharmacy     Refill      Daily Dose*    Pymt Type             05/13/2020  3   05/13/2020      Zolpidem Tartrate 10 MG Tablet        30.00 30  [facility]   [id number]    [facility] ([id number])   0  0.50 LME  [facility]   [place]   04/08/2020  3   04/08/2020      Zolpidem Tartrate 10 MG Tablet        30.00 30  Be Gal   [id number]    [facility] (7470)   0  0.50 L[place]  [facility] [place]   03/04/2020  3   03/04/2020      Zolpidem Tartrate 10 MG Tablet        30.00 30  Be Gal   [id number]    [facility] (7470)   0  0.50 L[place]  [facility] [place]   02/04/2020  3   02/04/2020      Zolpidem Tartrate 10 MG Tablet        30.00 30  Be Gal   [id number]    [facility] (7470)   0  0.50 L[place]  [facility]   [place]   01/03/2020  3   01/03/2020      Zolpidem Tartrate 10 MG Tablet        30.00 30  Be Gal   [id number]    [facility] (7470)   0  0.50 LME  [facility]   [place]   12/02/2019  3   12/02/2019      Zolpidem Tartrate 10 MG  Tablet        30.00 30  Be Gal   [id number]    [facility] (7470)   0  0.50 LME  [place]   10/21/2019  3   10/21/2019      Zolpidem Tartrate 10 MG Tablet        30.00 30  Be Gal   [id number]    [facility] (7470)   0  0.50 LME  [place]   09/09/2019  3   09/09/2019      Zolpidem Tartrate 10 MG Tablet        30.00 30  Be Gal   [id number]    [facility] (7470)   0  0.50 LME  [place]   08/06/2019  3   08/06/2019      Zolpidem Tartrate 10 MG Tablet        30.00 30  Be Gal   [id number]    [facility] (7470)   0  0.50 LME  [place]   06/25/2019  3   06/25/2019      Zolpidem Tartrate 10 MG Tablet        30.00 30  Me War   [id number]    [facility] (7470)   0  0.50 LME  [place]   04/30/2019  3   04/30/2019      Zolpidem Tartrate 10 MG Tablet        30.00 30  Be Gal   [id number]    [facility] (7470)   0  0.50 LME  [place]   03/27/2019  3   03/27/2019      Zolpidem Tartrate 10 MG Tablet        30.00 30  Be Gal   [id number]    [facility] (7470)   0  0.50 LME  [place]   02/19/2019  3   02/19/2019      Zolpidem Tartrate 10 MG Tablet        30.00 30  Be Gal   [id number]    [facility] (7470)   0  0.50 LME  [place]   01/14/2019  3   01/14/2019      Zolpidem Tartrate 10 MG Tablet        30.00 30  Be Gal   [id number]    [facility] (7470)   0  0.50 LME  [place]   11/27/2018  3   11/27/2018      Zolpidem Tartrate 10 MG Tablet        30.00 30  Be Gal   [id number]    [facility] (7470)   0  0.50 LME  [place]   10/22/2018  3   10/22/2018      Zolpidem Tartrate 10 MG Tablet        30.00 30  Be Gal   [id number]    [facility] (7470)   0  0.50 LME  [place]   09/11/2018  2   09/11/2018      Zolpidem Tartrate 10 MG Tablet        30.00 30  Be Gal   [id number]    [facility] (7470)   0  0.50 LME  [place]   07/31/20182018 1 07/31/2018      Zolpidem Tartrate 10 MG Tablet        30.00 30  Be Gal   [id number]    [facility] (0492)   0  0.50 LME  [place]   [place]     *Per CDC guidance, the MME conversion factors prescribed or provided as part of the medication-assisted treatment for opioid use disorder should not be used to  benchmark against dosage thresholds meant for opioids prescribed for pain. Buprenorphine products have no agreed upon morphine equivalency, and as partial opioid agonists, are not expected to be associated with overdose risk in the same dose-dependent manner as doses for full agonist opioids. MME = morphine milligram equivalents. LME = Lorazepam milligram equivalents. MG = dose in milligrams.         Providers    Total Providers: 2       Name    Address    City    State    Zipcode    Phone      Sussy Tan    [address]      [place] [address]    Rebekah Calix    [address]      [place] [address]          Pharmacies    Total Pharmacies: 2       Name    Address    City    State    Zipcode    Phone      [facility] [phone]  [facility] [id number]   [facility] [address] [phone]   [facility] [phone]) [address]   [facility] [address] [phone]

## 2020-07-05 NOTE — PROGRESS NOTES
"Endocrinology Clinic Progress Note  PCP: Rebekah Calix M.D.    HPI:  Don Olivas Jr. is a 54 y.o. old patient who comes in today for review of endocrine problems.  He feels \"sleepy\" all the time.    Papillary carcinoma of thyroid:  History of papillary thyroid carcinoma in 2007 with total thyroidectomy.  The patient had radioactive iodine ablation  on 9/11/19 for residual thyroid tissue.  Nuclear Med Body Scan done on 9/30/19 shows three discrete foci of increased uptake in the thyroid bed suggesting residual/recurrent thyroid tissue/disease and no distant metastatic disease.      Hypothyroidism:  Currently taking Levothyroxine 100 mcg daily. Results for SAWYER OLIVAS JR. (MRN [id number]) as of 7/7/2020 12:58   Ref. Range 7/6/2020 07:23   TSH Latest Ref Range: 0.380 - 5.330 uIU/mL 0.393   Free T-4 Latest Ref Range: 0.93 - 1.70 ng/dL 1.23   T3 Latest Ref Range: 60.0 - 181.0 ng/dL 102.0   T3,Free Latest Ref Range: 2.00 - 4.40 pg/mL 2.95   Thyroglobulin by LC-MC/MS-S/P Latest Ref Range: 1.3 - 31.8 ng/mL Not Applicable   Thyroglobulin Latest Ref Range: 1.3 - 31.8 ng/mL 0.5 (L)   Anti-Thyroglobulin Latest Ref Range: 0.0 - 4.0 IU/mL <0.9          Vitamin D Deficiency:  Currently taking Vitamin D 2000 units daily.   Results for SAWYER OLIVAS JR. (MRN [id number]) as of 7/7/2020 12:58   Ref. Range 7/6/2020 07:23   25-Hydroxy   Vitamin D 25 Latest Ref Range: 30 - 100 ng/mL 56     Increase sleepiness during daytime and feeling extremely refreshed after a 1510-minute power nap.    ROS:  Constitutional:fatigue, No unintentional weight loss  Sleep: snoring  in the night, increase sleepiness during daytime and feeling extremely refreshed after a 1510-minute power nap.Endo: Denies excessive thirst or frequent urination  All other systems were reviewed and were negative.    Past Medical History:  Patient Active Problem List    Diagnosis Date Noted   • Sedative, hypnotic or anxiolytic dependence (HCC)    • " "Vitamin D deficiency 10/29/2015   • Essential hypertension 07/13/2015   • Parotid gland enlargement 06/24/2015   • HTN (hypertension)    • ASTHMA 01/09/2013   • Postsurgical hypothyroidism 10/24/2012   • Hypothyroidism    • Insomnia    • Papillary carcinoma of thyroid (HCC)        Medications:    Current Outpatient Medications:   •  zolpidem (AMBIEN) 10 MG Tab, Take 1 Tab by mouth at bedtime as needed for Sleep for up to 30 days., Disp: 30 Tab, Rfl: 0  •  losartan (COZAAR) 50 MG Tab, Take 1 Tab by mouth every day., Disp: 90 Tab, Rfl: 1  •  meloxicam (MOBIC) 15 MG tablet, TAKE 1 TABLET BY MOUTH EVERY DAY, Disp: 30 Tab, Rfl: 1  •  Vitamin D, Ergocalciferol, 2000 units Cap, Take  by mouth., Disp: , Rfl:   •  atorvastatin (LIPITOR) 20 MG Tab, TAKE 1 TABLET BY MOUTH EVERY DAY, Disp: 90 Tab, Rfl: 1  •  Aug Betamethasone Dipropionate (DIPROLENE-AF) 0.05 % Cream, Apply thinly to affected areas twice daily., Disp: 30 g, Rfl: 1  •  levothyroxine (SYNTHROID) 100 MCG Tab, Take 1 Tab by mouth every day., Disp: 90 Tab, Rfl: 3  •  Calcium Carb-Cholecalciferol (CALCIUM 600 + D PO), Take  by mouth 2 Times a Day., Disp: , Rfl:     Labs: Reviewed    Physical Examination:  Vital signs: /70   Pulse 88   Ht 1.676 m (5' 6\")   Wt 83.9 kg (185 lb)   SpO2 98%   BMI 29.86 kg/m²  Body mass index is 29.86 kg/m². Patient's body mass index is 29.86 kg/m².   General: No apparent distress, cooperative  Eyes: No scleral icterus or discharge  ENMT: Normal on external inspection of nose, lips, Scar from previous thyroidectomy present.   Neck: No abnormal masses on inspection  Resp: Normal effort, clear to auscultation bilaterally   CVS: Regular rate and rhythm, S1 S2 normal, no murmur   Extremities: No edema  Abdomen: abdominal obesity present  Neuro: Alert and oriented  Skin: No rash  Psych: Normal mood and affect, intact memory and able to make informed decisions    Assessment and Plan:    1. Postoperative hypothyroidism  Continue current " dose of levothyroxine.    2. Papillary carcinoma of thyroid   Status post total thyroidectomy and radioactive iodine ablation for residual cancer in the thyroid bed.  Doing really very well.  Most recent markers for thyroid cancer are negative for recurrence.    3. Vitamin D deficiency  Continue vitamin D with food as per HPI.    4. Daytime sleepiness  Could be due to sleep apnea.  He does report snoring in the night as per his wife.  He does feel extremely refreshed even after a 10 to 15-minute power nap.  Advised to discuss this with Dr. Calix for a possible sleep study and potentially CPAP therapy if indicated.      Return in about 6 months (around 1/7/2021).    Thank you for allowing me to participate in the care of this patient.        CC:   Rebekah Calix M.D.    This note was created using voice recognition software (Dragon). The accuracy of the dictation is limited by the abilities of the software. I have reviewed the note prior to signing, however some errors in grammar and context are still possible. If you have any questions related to this note please do not hesitate to contact our office.

## 2020-07-06 ENCOUNTER — HOSPITAL ENCOUNTER (OUTPATIENT)
Dept: LAB | Facility: MEDICAL CENTER | Age: 55
End: 2020-07-06
Attending: INTERNAL MEDICINE
Payer: COMMERCIAL

## 2020-07-06 DIAGNOSIS — E55.9 VITAMIN D DEFICIENCY: ICD-10-CM

## 2020-07-06 DIAGNOSIS — E89.0 POSTOPERATIVE HYPOTHYROIDISM: ICD-10-CM

## 2020-07-06 DIAGNOSIS — C73 PAPILLARY ADENOCARCINOMA OF THYROID (HCC): ICD-10-CM

## 2020-07-06 LAB
25(OH)D3 SERPL-MCNC: 56 NG/ML (ref 30–100)
T3 SERPL-MCNC: 102 NG/DL (ref 60–181)
T3FREE SERPL-MCNC: 2.95 PG/ML (ref 2–4.4)
T4 FREE SERPL-MCNC: 1.23 NG/DL (ref 0.93–1.7)
TSH SERPL DL<=0.005 MIU/L-ACNC: 0.39 UIU/ML (ref 0.38–5.33)

## 2020-07-06 PROCEDURE — 82306 VITAMIN D 25 HYDROXY: CPT

## 2020-07-06 PROCEDURE — 84480 ASSAY TRIIODOTHYRONINE (T3): CPT

## 2020-07-06 PROCEDURE — 84439 ASSAY OF FREE THYROXINE: CPT

## 2020-07-06 PROCEDURE — 84432 ASSAY OF THYROGLOBULIN: CPT

## 2020-07-06 PROCEDURE — 86800 THYROGLOBULIN ANTIBODY: CPT

## 2020-07-06 PROCEDURE — 84481 FREE ASSAY (FT-3): CPT

## 2020-07-06 PROCEDURE — 36415 COLL VENOUS BLD VENIPUNCTURE: CPT

## 2020-07-06 PROCEDURE — 84443 ASSAY THYROID STIM HORMONE: CPT

## 2020-07-07 ENCOUNTER — OFFICE VISIT (OUTPATIENT)
Dept: ENDOCRINOLOGY | Facility: MEDICAL CENTER | Age: 55
End: 2020-07-07
Attending: INTERNAL MEDICINE
Payer: COMMERCIAL

## 2020-07-07 VITALS
BODY MASS INDEX: 29.73 KG/M2 | HEIGHT: 66 IN | OXYGEN SATURATION: 98 % | SYSTOLIC BLOOD PRESSURE: 120 MMHG | HEART RATE: 88 BPM | DIASTOLIC BLOOD PRESSURE: 70 MMHG | WEIGHT: 185 LBS

## 2020-07-07 DIAGNOSIS — R53.83 FATIGUE, UNSPECIFIED TYPE: ICD-10-CM

## 2020-07-07 DIAGNOSIS — E55.9 VITAMIN D DEFICIENCY: ICD-10-CM

## 2020-07-07 DIAGNOSIS — C73 PAPILLARY CARCINOMA OF THYROID (HCC): ICD-10-CM

## 2020-07-07 DIAGNOSIS — E89.0 POSTOPERATIVE HYPOTHYROIDISM: ICD-10-CM

## 2020-07-07 DIAGNOSIS — E53.8 VITAMIN B 12 DEFICIENCY: ICD-10-CM

## 2020-07-07 DIAGNOSIS — R40.0 DAYTIME SLEEPINESS: ICD-10-CM

## 2020-07-07 LAB
THYROGLOB AB SERPL-ACNC: <0.9 IU/ML (ref 0–4)
THYROGLOB SERPL-MCNC: 0.5 NG/ML (ref 1.3–31.8)
THYROGLOB SERPL-MCNC: ABNORMAL NG/ML (ref 1.3–31.8)

## 2020-07-07 PROCEDURE — 99211 OFF/OP EST MAY X REQ PHY/QHP: CPT | Performed by: INTERNAL MEDICINE

## 2020-07-07 PROCEDURE — 99214 OFFICE O/P EST MOD 30 MIN: CPT | Performed by: INTERNAL MEDICINE

## 2020-07-07 ASSESSMENT — FIBROSIS 4 INDEX: FIB4 SCORE: 1.57

## 2020-07-10 RX ORDER — MELOXICAM 15 MG/1
TABLET ORAL
Qty: 90 TAB | Refills: 1 | Status: SHIPPED | OUTPATIENT
Start: 2020-07-10 | End: 2021-06-11

## 2020-07-10 RX ORDER — ATORVASTATIN CALCIUM 20 MG/1
TABLET, FILM COATED ORAL
Qty: 90 TAB | Refills: 1 | Status: SHIPPED | OUTPATIENT
Start: 2020-07-10 | End: 2021-01-05

## 2020-09-08 DIAGNOSIS — E89.0 POSTSURGICAL HYPOTHYROIDISM: ICD-10-CM

## 2020-09-08 RX ORDER — LEVOTHYROXINE SODIUM 0.1 MG/1
100 TABLET ORAL
Qty: 90 TAB | Refills: 3 | Status: SHIPPED | OUTPATIENT
Start: 2020-09-08 | End: 2021-09-14 | Stop reason: SDUPTHER

## 2020-09-14 ENCOUNTER — HOSPITAL ENCOUNTER (OUTPATIENT)
Dept: LAB | Facility: MEDICAL CENTER | Age: 55
End: 2020-09-14
Attending: FAMILY MEDICINE
Payer: COMMERCIAL

## 2020-09-14 DIAGNOSIS — E55.9 VITAMIN D DEFICIENCY: ICD-10-CM

## 2020-09-14 DIAGNOSIS — G47.00 INSOMNIA, UNSPECIFIED TYPE: ICD-10-CM

## 2020-09-14 DIAGNOSIS — I10 ESSENTIAL HYPERTENSION: ICD-10-CM

## 2020-09-14 DIAGNOSIS — E78.5 DYSLIPIDEMIA: ICD-10-CM

## 2020-09-14 DIAGNOSIS — Z12.5 SCREENING FOR PROSTATE CANCER: ICD-10-CM

## 2020-09-14 LAB
25(OH)D3 SERPL-MCNC: 66 NG/ML (ref 30–100)
ALBUMIN SERPL BCP-MCNC: 4.3 G/DL (ref 3.2–4.9)
ALBUMIN/GLOB SERPL: 1.6 G/DL
ALP SERPL-CCNC: 69 U/L (ref 30–99)
ALT SERPL-CCNC: 23 U/L (ref 2–50)
ANION GAP SERPL CALC-SCNC: 9 MMOL/L (ref 7–16)
AST SERPL-CCNC: 21 U/L (ref 12–45)
BASOPHILS # BLD AUTO: 1.6 % (ref 0–1.8)
BASOPHILS # BLD: 0.1 K/UL (ref 0–0.12)
BILIRUB SERPL-MCNC: 0.9 MG/DL (ref 0.1–1.5)
BUN SERPL-MCNC: 19 MG/DL (ref 8–22)
CALCIUM SERPL-MCNC: 8 MG/DL (ref 8.5–10.5)
CHLORIDE SERPL-SCNC: 103 MMOL/L (ref 96–112)
CHOLEST SERPL-MCNC: 125 MG/DL (ref 100–199)
CO2 SERPL-SCNC: 28 MMOL/L (ref 20–33)
CREAT SERPL-MCNC: 1.08 MG/DL (ref 0.5–1.4)
EOSINOPHIL # BLD AUTO: 0.21 K/UL (ref 0–0.51)
EOSINOPHIL NFR BLD: 3.3 % (ref 0–6.9)
ERYTHROCYTE [DISTWIDTH] IN BLOOD BY AUTOMATED COUNT: 42.7 FL (ref 35.9–50)
FASTING STATUS PATIENT QL REPORTED: NORMAL
GLOBULIN SER CALC-MCNC: 2.7 G/DL (ref 1.9–3.5)
GLUCOSE SERPL-MCNC: 96 MG/DL (ref 65–99)
HCT VFR BLD AUTO: 47.5 % (ref 42–52)
HDLC SERPL-MCNC: 45 MG/DL
HGB BLD-MCNC: 16.4 G/DL (ref 14–18)
IMM GRANULOCYTES # BLD AUTO: 0.03 K/UL (ref 0–0.11)
IMM GRANULOCYTES NFR BLD AUTO: 0.5 % (ref 0–0.9)
LDLC SERPL CALC-MCNC: 59 MG/DL
LYMPHOCYTES # BLD AUTO: 1.64 K/UL (ref 1–4.8)
LYMPHOCYTES NFR BLD: 25.6 % (ref 22–41)
MCH RBC QN AUTO: 32.1 PG (ref 27–33)
MCHC RBC AUTO-ENTMCNC: 34.5 G/DL (ref 33.7–35.3)
MCV RBC AUTO: 93 FL (ref 81.4–97.8)
MONOCYTES # BLD AUTO: 0.61 K/UL (ref 0–0.85)
MONOCYTES NFR BLD AUTO: 9.5 % (ref 0–13.4)
NEUTROPHILS # BLD AUTO: 3.81 K/UL (ref 1.82–7.42)
NEUTROPHILS NFR BLD: 59.5 % (ref 44–72)
NRBC # BLD AUTO: 0 K/UL
NRBC BLD-RTO: 0 /100 WBC
PLATELET # BLD AUTO: 166 K/UL (ref 164–446)
PMV BLD AUTO: 10.7 FL (ref 9–12.9)
POTASSIUM SERPL-SCNC: 4 MMOL/L (ref 3.6–5.5)
PROT SERPL-MCNC: 7 G/DL (ref 6–8.2)
PSA SERPL-MCNC: 0.82 NG/ML (ref 0–4)
RBC # BLD AUTO: 5.11 M/UL (ref 4.7–6.1)
SODIUM SERPL-SCNC: 140 MMOL/L (ref 135–145)
TRIGL SERPL-MCNC: 103 MG/DL (ref 0–149)
WBC # BLD AUTO: 6.4 K/UL (ref 4.8–10.8)

## 2020-09-14 PROCEDURE — 84153 ASSAY OF PSA TOTAL: CPT

## 2020-09-14 PROCEDURE — 82306 VITAMIN D 25 HYDROXY: CPT

## 2020-09-14 PROCEDURE — 80053 COMPREHEN METABOLIC PANEL: CPT

## 2020-09-14 PROCEDURE — 36415 COLL VENOUS BLD VENIPUNCTURE: CPT

## 2020-09-14 PROCEDURE — 80061 LIPID PANEL: CPT

## 2020-09-14 PROCEDURE — 85025 COMPLETE CBC W/AUTO DIFF WBC: CPT

## 2020-09-21 ENCOUNTER — OFFICE VISIT (OUTPATIENT)
Dept: MEDICAL GROUP | Facility: PHYSICIAN GROUP | Age: 55
End: 2020-09-21
Payer: COMMERCIAL

## 2020-09-21 ENCOUNTER — HOSPITAL ENCOUNTER (OUTPATIENT)
Facility: MEDICAL CENTER | Age: 55
End: 2020-09-21
Attending: FAMILY MEDICINE
Payer: COMMERCIAL

## 2020-09-21 VITALS
DIASTOLIC BLOOD PRESSURE: 80 MMHG | BODY MASS INDEX: 29.69 KG/M2 | HEART RATE: 68 BPM | HEIGHT: 66 IN | TEMPERATURE: 97.1 F | WEIGHT: 184.75 LBS | OXYGEN SATURATION: 99 % | SYSTOLIC BLOOD PRESSURE: 140 MMHG

## 2020-09-21 DIAGNOSIS — Z23 NEED FOR IMMUNIZATION AGAINST INFLUENZA: ICD-10-CM

## 2020-09-21 DIAGNOSIS — I10 ESSENTIAL HYPERTENSION: ICD-10-CM

## 2020-09-21 DIAGNOSIS — M25.561 CHRONIC PAIN OF RIGHT KNEE: ICD-10-CM

## 2020-09-21 DIAGNOSIS — E83.51 HYPOCALCEMIA: ICD-10-CM

## 2020-09-21 DIAGNOSIS — R40.0 DAYTIME SLEEPINESS: ICD-10-CM

## 2020-09-21 DIAGNOSIS — E55.9 VITAMIN D DEFICIENCY: ICD-10-CM

## 2020-09-21 DIAGNOSIS — E78.5 DYSLIPIDEMIA: ICD-10-CM

## 2020-09-21 DIAGNOSIS — R06.83 LOUD SNORING: ICD-10-CM

## 2020-09-21 DIAGNOSIS — G47.00 INSOMNIA, UNSPECIFIED TYPE: ICD-10-CM

## 2020-09-21 DIAGNOSIS — E89.0 POSTSURGICAL HYPOTHYROIDISM: ICD-10-CM

## 2020-09-21 DIAGNOSIS — G89.29 CHRONIC PAIN OF RIGHT KNEE: ICD-10-CM

## 2020-09-21 DIAGNOSIS — F13.20 SEDATIVE, HYPNOTIC OR ANXIOLYTIC DEPENDENCE (HCC): ICD-10-CM

## 2020-09-21 LAB — AMBIGUOUS DTTM AMBI4: NORMAL

## 2020-09-21 PROCEDURE — 80307 DRUG TEST PRSMV CHEM ANLYZR: CPT

## 2020-09-21 PROCEDURE — 90471 IMMUNIZATION ADMIN: CPT | Performed by: FAMILY MEDICINE

## 2020-09-21 PROCEDURE — 99214 OFFICE O/P EST MOD 30 MIN: CPT | Mod: 25 | Performed by: FAMILY MEDICINE

## 2020-09-21 PROCEDURE — 90686 IIV4 VACC NO PRSV 0.5 ML IM: CPT | Performed by: FAMILY MEDICINE

## 2020-09-21 RX ORDER — LOSARTAN POTASSIUM 100 MG/1
100 TABLET ORAL DAILY
Qty: 90 TAB | Refills: 1 | Status: SHIPPED | OUTPATIENT
Start: 2020-09-21 | End: 2021-04-08

## 2020-09-21 RX ORDER — ZOLPIDEM TARTRATE 10 MG/1
10 TABLET ORAL NIGHTLY PRN
Qty: 30 TAB | Refills: 0 | Status: SHIPPED | OUTPATIENT
Start: 2020-09-21 | End: 2020-10-23 | Stop reason: SDUPTHER

## 2020-09-21 ASSESSMENT — FIBROSIS 4 INDEX: FIB4 SCORE: 1.45

## 2020-09-21 NOTE — PROGRESS NOTES
Subjective:      Tom Olivas Jr. is a 54 y.o. male who presents with Hypertension and Medication Refill (ambien)            HPI     Patient returns for follow-up of her medical problems as well as some other concerns.    He continues to complain of right knee pain which started in January 2020 without any history of trauma.  His x-ray showed mild degenerative change.  We tried doing steroid injection in February 2020 that did not make any improvement.  He has been taking meloxicam in the morning which helps with the pain throughout the day but at night the pain starts to bother him again.    His endocrinologist recommended that he gets a sleep study done because of his daytime sleepiness and loud snoring.  He said he wakes up several times at night because of very dry mouth and trouble breathing.  He was recommended by his endocrinologist to take B12 supplementation to boost his energy during the day.    In terms of his hypertension, we increase the dose of the losartan from 25 to 50 mg daily 6 months ago because blood pressure was still not adequately controlled.  He said home blood pressures are still running high especially in the afternoon 130s over  with associated occipital pain.    For his dyslipidemia, he continues to take atorvastatin without myalgias.    He continues to take vitamin D supplementation for vitamin D deficiency 2000 international units daily.    For his hypocalcemia, he said he has not been regular in taking his calcium supplement 600 mg 1 tablet twice a day.  He said he is lactose intolerant so he takes almond milk or soy milk.    He continues to follow-up with endocrinologist for postsurgical hypothyroidism secondary to thyroid cancer.  He takes thyroid replacement.  He has been cancer free in terms of the blood work monitoring the has been done regularly.    He continues to take zolpidem for insomnia with good results.  He is due for urine drug screen and controlled  "substance treatment agreement.  The last dose was last night.      Past medical history, past surgical history, family history reviewed-no changes    Social history reviewed-no changes    Allergies reviewed-no changes    Medications reviewed-no changes      ROS     As per HPI, the rest are negative.       Objective:     /80 (BP Location: Left arm, Patient Position: Sitting, BP Cuff Size: Adult)   Pulse 68   Temp 36.2 °C (97.1 °F) (Temporal)   Ht 1.676 m (5' 6\")   Wt 83.8 kg (184 lb 11.9 oz)   SpO2 99%   BMI 29.82 kg/m²      Physical Exam      Examined alert, awake, oriented, not in distress    Neck-supple, no lymphadenopathy, no thyromegaly  Lungs-clear to auscultation, no rales, no wheezes  Heart-regular rate and rhythm, no murmur  Extremities-no edema, clubbing, cyanosis, right knee exam- no effusion, no swelling, no redness, full range of movement without instability or laxity, tender medial joint line     Hospital Outpatient Visit on 09/14/2020   Component Date Value Ref Range Status   • Prostatic Specific Antigen Tot 09/14/2020 0.82  0.00 - 4.00 ng/mL Final    Comment: Effective 3/18/2020, this assay is performed using Roche eliz e immunoassay  analyzer. tPSA values determined on patient samples by different testing  procedures cannot be directly compared with one another and could be the  cause of erroneous medical interpretations. If there is a change in the tPSA  assay procedure used while monitoring therapy, then new baselines may need  to be established when comparing previous results with results received  after 3/17/2020.     • 25-Hydroxy   Vitamin D 25 09/14/2020 66  30 - 100 ng/mL Final    Comment: Adult Ranges:   <20 ng/mL - Deficiency  20-29 ng/mL - Insufficiency   ng/mL - Sufficiency  Effective 3/18/2020, this electrochemiluminescence binding assay is  performed using Roche eliz e immunoassay analyzer.  The [employer] Vitamin D  total II assay is intended for the quantitative " determination of total 25  hydroxyvitamin D in human serum and plasma. This assay is to be used as an  aid in the assessment of vitamin D sufficiency in adults.     • WBC 09/14/2020 6.4  4.8 - 10.8 K/uL Final   • RBC 09/14/2020 5.11  4.70 - 6.10 M/uL Final   • Hemoglobin 09/14/2020 16.4  14.0 - 18.0 g/dL Final   • Hematocrit 09/14/2020 47.5  42.0 - 52.0 % Final   • MCV 09/14/2020 93.0  81.4 - 97.8 fL Final   • MCH 09/14/2020 32.1  27.0 - 33.0 pg Final   • MCHC 09/14/2020 34.5  33.7 - 35.3 g/dL Final   • RDW 09/14/2020 42.7  35.9 - 50.0 fL Final   • Platelet Count 09/14/2020 166  164 - 446 K/uL Final   • MPV 09/14/2020 10.7  9.0 - 12.9 fL Final   • Neutrophils-Polys 09/14/2020 59.50  44.00 - 72.00 % Final   • Lymphocytes 09/14/2020 25.60  22.00 - 41.00 % Final   • Monocytes 09/14/2020 9.50  0.00 - 13.40 % Final   • Eosinophils 09/14/2020 3.30  0.00 - 6.90 % Final   • Basophils 09/14/2020 1.60  0.00 - 1.80 % Final   • Immature Granulocytes 09/14/2020 0.50  0.00 - 0.90 % Final   • Nucleated RBC 09/14/2020 0.00  /100 WBC Final   • Neutrophils (Absolute) 09/14/2020 3.81  1.82 - 7.42 K/uL Final    Includes immature neutrophils, if present.   • Lymphs (Absolute) 09/14/2020 1.64  1.00 - 4.80 K/uL Final   • Monos (Absolute) 09/14/2020 0.61  0.00 - 0.85 K/uL Final   • Eos (Absolute) 09/14/2020 0.21  0.00 - 0.51 K/uL Final   • Baso (Absolute) 09/14/2020 0.10  0.00 - 0.12 K/uL Final   • Immature Granulocytes (abs) 09/14/2020 0.03  0.00 - 0.11 K/uL Final   • NRBC (Absolute) 09/14/2020 0.00  K/uL Final   • Sodium 09/14/2020 140  135 - 145 mmol/L Final   • Potassium 09/14/2020 4.0  3.6 - 5.5 mmol/L Final   • Chloride 09/14/2020 103  96 - 112 mmol/L Final   • Co2 09/14/2020 28  20 - 33 mmol/L Final   • Anion Gap 09/14/2020 9.0  7.0 - 16.0 Final   • Glucose 09/14/2020 96  65 - 99 mg/dL Final   • Bun 09/14/2020 19  8 - 22 mg/dL Final   • Creatinine 09/14/2020 1.08  0.50 - 1.40 mg/dL Final   • Calcium 09/14/2020 8.0* 8.5 - 10.5 mg/dL  Final   • AST(SGOT) 09/14/2020 21  12 - 45 U/L Final   • ALT(SGPT) 09/14/2020 23  2 - 50 U/L Final   • Alkaline Phosphatase 09/14/2020 69  30 - 99 U/L Final   • Total Bilirubin 09/14/2020 0.9  0.1 - 1.5 mg/dL Final   • Albumin 09/14/2020 4.3  3.2 - 4.9 g/dL Final   • Total Protein 09/14/2020 7.0  6.0 - 8.2 g/dL Final   • Globulin 09/14/2020 2.7  1.9 - 3.5 g/dL Final   • A-G Ratio 09/14/2020 1.6  g/dL Final   • Cholesterol,Tot 09/14/2020 125  100 - 199 mg/dL Final   • Triglycerides 09/14/2020 103  0 - 149 mg/dL Final   • HDL 09/14/2020 45  >=40 mg/dL Final   • LDL 09/14/2020 59  <100 mg/dL Final   • Fasting Status 09/14/2020 Fasting   Final   • GFR If  09/14/2020 >60  >60 mL/min/1.73 m 2 Final   • GFR If Non  09/14/2020 >60  >60 mL/min/1.73 m 2 Final          Assessment/Plan:        1. Chronic pain of right knee  This is been ongoing since January 2020.  X-ray showed mild degenerative change.  He did not have good results with steroid injection in February 2020.  We will proceed with MRI of the knee.  Further recommendation will depend on results.  - MR-KNEE-W/O RIGHT; Future    2. Daytime sleepiness  He has daytime sleepiness, loud snoring, frequent interruptions with his sleep at night.  We will proceed with referral to pulmonary sleep medicine for evaluation and for sleep study.  - REFERRAL TO PULMONARY AND SLEEP MEDICINE Sleep Medicine    3. Loud snoring  Plan the same as #2.  - REFERRAL TO PULMONARY AND SLEEP MEDICINE Sleep Medicine    4. Essential hypertension  Not adequately controlled.  Increase losartan to 100 mg 1 tablet daily.  Reevaluate in 1 month.  - losartan (COZAAR) 100 MG Tab; Take 1 Tab by mouth every day.  Dispense: 90 Tab; Refill: 1    5. Dyslipidemia  All at target on atorvastatin.    6. Vitamin D deficiency  Vitamin D is normal.  Continue vitamin D supplementation.    7. Hypocalcemia  He is still hypocalcemic with a mildly low calcium level.  Advised to take his  calcium supplement regularly as instructed.    8. Postsurgical hypothyroidism  He is followed by his endocrinologist and he has been adequately replaced.  There has not been any evidence of cancer on blood work monitoring.    9. Need for immunization against influenza  Flu shot was given today.  - Influenza Vaccine Quad Injection (PF)    10. Insomnia, unspecified type  Updated urine drug screen was done today.  He signed an updated controlled substance treatment agreement.  Continue zolpidem.  Refill was sent to the pharmacy electronically.I have reviewed the medical records, the prescription monitoring program and I have determined that the controlled prescription medication is medically indicated. I have advised the patient to keep the medication in a safe place and not to drive while taking the medication.  - Pain Management Screen; Future  - Controlled Substance Treatment Agreement    11. Sedative, hypnotic or anxiolytic dependence (HCC)  Same as #10.  - Pain Management Screen; Future  - Controlled Substance Treatment Agreement  Follow-up in 1 month to check the blood pressure.    Please note that this dictation was created using voice recognition software. I have worked with consultants from the vendor as well as technical experts from [facility]  [employer] to optimize the interface. I have made every reasonable attempt to correct obvious errors, but I expect that there are errors of grammar and possibly content I did not discover before finalizing the note.

## 2020-09-25 LAB
6MAM UR QL: NOT DETECTED
7AMINOCLONAZEPAM UR QL: NOT DETECTED
A-OH ALPRAZ UR QL: NOT DETECTED
ALPRAZ UR QL: NOT DETECTED
AMPHET UR QL SCN: NOT DETECTED
ANNOTATION COMMENT IMP: NORMAL
ANNOTATION COMMENT IMP: NORMAL
BARBITURATES UR QL: NOT DETECTED
BUPRENORPHINE UR QL: NOT DETECTED
BZE UR QL: NOT DETECTED
CARBOXYTHC UR QL: NOT DETECTED
CARISOPRODOL UR QL: NOT DETECTED
CLONAZEPAM UR QL: NOT DETECTED
CODEINE UR QL: NOT DETECTED
DIAZEPAM UR QL: NOT DETECTED
ETHYL GLUCURONIDE UR QL: NOT DETECTED
FENTANYL UR QL: NOT DETECTED
HYDROCODONE UR QL: NOT DETECTED
HYDROMORPHONE UR QL: NOT DETECTED
LORAZEPAM UR QL: NOT DETECTED
MDA UR QL: NOT DETECTED
MDEA UR QL: NOT DETECTED
MDMA UR QL: NOT DETECTED
MEPERIDINE UR QL: NOT DETECTED
METHADONE UR QL: NOT DETECTED
METHAMPHET UR QL: NOT DETECTED
MIDAZOLAM UR QL SCN: NOT DETECTED
MORPHINE UR QL: NOT DETECTED
NORBUPRENORPHINE UR QL CFM: NOT DETECTED
NORDIAZEPAM UR QL: NOT DETECTED
NORFENTANYL UR QL: NOT DETECTED
NORHYDROCODONE UR QL CFM: NOT DETECTED
NOROXYCODONE UR QL CFM: NOT DETECTED
NOROXYMORPH CO100 Q0458: NOT DETECTED
OXAZEPAM UR QL: NOT DETECTED
OXYCODONE UR QL: NOT DETECTED
OXYMORPHONE UR QL: NOT DETECTED
PATHOLOGY STUDY: NORMAL
PCP UR QL: NOT DETECTED
PHENTERMINE UR QL: NOT DETECTED
PPAA UR QL: NOT DETECTED
PROPOXYPH UR QL: NOT DETECTED
SERVICE CMNT-IMP: NORMAL
TAPENADOL OSULF CO200 Q0473: NOT DETECTED
TAPENTADOL UR QL SCN: NOT DETECTED
TEMAZEPAM UR QL: NOT DETECTED
TRAMADOL UR QL: NOT DETECTED
ZOLPIDEM UR QL: NOT DETECTED

## 2020-10-23 DIAGNOSIS — G47.00 INSOMNIA, UNSPECIFIED TYPE: ICD-10-CM

## 2020-10-23 RX ORDER — ZOLPIDEM TARTRATE 10 MG/1
10 TABLET ORAL NIGHTLY PRN
Qty: 30 TAB | Refills: 0 | Status: SHIPPED | OUTPATIENT
Start: 2020-10-23 | End: 2020-11-23

## 2021-01-05 ENCOUNTER — HOSPITAL ENCOUNTER (OUTPATIENT)
Dept: LAB | Facility: MEDICAL CENTER | Age: 56
End: 2021-01-05
Attending: INTERNAL MEDICINE
Payer: COMMERCIAL

## 2021-01-05 DIAGNOSIS — E53.8 VITAMIN B 12 DEFICIENCY: ICD-10-CM

## 2021-01-05 DIAGNOSIS — C73 PAPILLARY CARCINOMA OF THYROID (HCC): ICD-10-CM

## 2021-01-05 DIAGNOSIS — E89.0 POSTOPERATIVE HYPOTHYROIDISM: ICD-10-CM

## 2021-01-05 DIAGNOSIS — E55.9 VITAMIN D DEFICIENCY: ICD-10-CM

## 2021-01-05 LAB
25(OH)D3 SERPL-MCNC: 51 NG/ML (ref 30–100)
T3 SERPL-MCNC: 114 NG/DL (ref 60–181)
T3FREE SERPL-MCNC: 3.37 PG/ML (ref 2–4.4)
T4 FREE SERPL-MCNC: 1.74 NG/DL (ref 0.93–1.7)
TSH SERPL DL<=0.005 MIU/L-ACNC: 0.04 UIU/ML (ref 0.38–5.33)
VIT B12 SERPL-MCNC: 1193 PG/ML (ref 211–911)

## 2021-01-05 PROCEDURE — 84481 FREE ASSAY (FT-3): CPT

## 2021-01-05 PROCEDURE — 82607 VITAMIN B-12: CPT

## 2021-01-05 PROCEDURE — 36415 COLL VENOUS BLD VENIPUNCTURE: CPT

## 2021-01-05 PROCEDURE — 84439 ASSAY OF FREE THYROXINE: CPT

## 2021-01-05 PROCEDURE — 86800 THYROGLOBULIN ANTIBODY: CPT

## 2021-01-05 PROCEDURE — 84432 ASSAY OF THYROGLOBULIN: CPT

## 2021-01-05 PROCEDURE — 84480 ASSAY TRIIODOTHYRONINE (T3): CPT

## 2021-01-05 PROCEDURE — 84443 ASSAY THYROID STIM HORMONE: CPT

## 2021-01-05 PROCEDURE — 82306 VITAMIN D 25 HYDROXY: CPT

## 2021-01-11 ENCOUNTER — TELEMEDICINE (OUTPATIENT)
Dept: ENDOCRINOLOGY | Facility: MEDICAL CENTER | Age: 56
End: 2021-01-11
Attending: INTERNAL MEDICINE
Payer: COMMERCIAL

## 2021-01-11 VITALS
BODY MASS INDEX: 28.57 KG/M2 | WEIGHT: 177.8 LBS | DIASTOLIC BLOOD PRESSURE: 80 MMHG | HEIGHT: 66 IN | SYSTOLIC BLOOD PRESSURE: 114 MMHG | HEART RATE: 66 BPM

## 2021-01-11 DIAGNOSIS — E83.51 HYPOCALCEMIA: ICD-10-CM

## 2021-01-11 DIAGNOSIS — E55.9 VITAMIN D DEFICIENCY: ICD-10-CM

## 2021-01-11 DIAGNOSIS — C73 PAPILLARY CARCINOMA OF THYROID (HCC): ICD-10-CM

## 2021-01-11 DIAGNOSIS — E89.0 POSTOPERATIVE HYPOTHYROIDISM: ICD-10-CM

## 2021-01-11 PROCEDURE — 99214 OFFICE O/P EST MOD 30 MIN: CPT | Mod: 95,CR | Performed by: NURSE PRACTITIONER

## 2021-01-11 ASSESSMENT — FIBROSIS 4 INDEX: FIB4 SCORE: 1.48

## 2021-01-11 NOTE — PROGRESS NOTES
CHIEF COMPLAINT: Followup of postsurgical hypothyroidism, Vitamin D Deficiency & Hypocalcemia.   Previously seen by Dr. Hyatt.   Patient was presented for a telehealth consultation via secure and encrypted videoconferencing technology. This encounter was conducted via Zoom . Verbal consent was obtained. Patient's identity was verified.      HPI:   Don Olivas Jr. is a 55 y.o. male with continued evaluation of the followin. Papillary carcinoma of thyroid:    History of papillary thyroid carcinoma in  with total thyroidectomy. Pathology revealed: A.  Right thyroid lobe: Carcinoma having the following characteristics:  -Histologic type:  Papillary carcinoma.  -Extent of invasion: The tumor measures 2 cm in greatest dimension and there is no evidence of extra-thyroidal extension and negative margins.  There was a focus on microcarcinoma 0.3 cm PTC as well on the left lobe.       The patient had radioactive iodine ablation  on 19 for residual thyroid tissue.  Nuclear Med Body Scan done on 19 shows three discrete foci of increased uptake in the thyroid bed suggesting residual/recurrent thyroid tissue/disease and no distant metastatic disease. Neck ultrasound was last done on 2017 with no evidence of recurrence.        Since last visit, he has remained on Levothyroxine 100 mcg QD which has been his dose since 2015.  He is feeling well.  Energy level has been good since 2020.  Weight is down 8 pounds.  No palpitations, no frequent diarrhea, or frequent constipation.  No heat or cold intolerance.  No anterior neck symptoms or problems.  No voice changes.      2. Hypothyroidism:   Since last visit, he has remained on Levothyroxine 100 mcg QD which has been his dose since 2015.  He is feeling well.  Energy level has been good since 2020.  Weight is down 8 pounds.  No palpitations, no frequent diarrhea, or frequent constipation.  No heat or cold intolerance.  No anterior  neck symptoms or problems.  No voice changes.   Lab Results   Component Value Date/Time    TSHULTRASEN 0.035 (L) 01/05/2021 0836     Lab Results   Component Value Date/Time    FREET4 1.74 (H) 01/05/2021 0836     Lab Results   Component Value Date/Time    FREET3 3.37 01/05/2021 0836     3. Vitamin D Deficiency:   Vitamin D Level 01/05/2021 51  Currently taking Vitamin D 2000IU QD.    4. Hypocalcemia  Calcium Level 8.0 09/14/2020  Currently taking Calcium Carb-Cholecalciferol (CALCIUM 600 + D PO) x 2 Daily.        ROS:      CONS:     No fever, no chills   EYES:     No diplopia, no blurry vision   CV:           No chest pain, no palpitations   PULM:     No SOB, no cough, no hemoptysis.   GI:            No nausea, no vomiting, no diarrhea, no constipation   ENDO:     No polyuria, no polydipsia, no heat intolerance, no cold intolerance       Past Medical History:  Problem List:  2020-09: Right knee pain  2015-10: Vitamin D deficiency  2015-07: Essential hypertension  2015-06: Parotid gland enlargement  2015-04: HTN (hypertension)  2013-01: ASTHMA  2012-10: Postsurgical hypothyroidism  Hypothyroidism  Insomnia  Papillary carcinoma of thyroid (HCC)  HTN (hypertension)  Sedative, hypnotic or anxiolytic dependence (HCC)      Past Surgical History:  Past Surgical History:   Procedure Laterality Date   • COLONOSCOPY  05/08/2018    mild divertoculosis sigmoid colon, int hemorrhoids   • THYROIDECTOMY TOTAL  2007    due to thyroid cancer        Allergies:  Lisinopril     Social History:  Social History     Tobacco Use   • Smoking status: Current Every Day Smoker     Packs/day: 0.30     Years: 34.00     Pack years: 10.20     Types: Cigarettes   • Smokeless tobacco: Never Used   Substance Use Topics   • Alcohol use: No     Alcohol/week: 0.0 oz     Comment: none in 12 yrs   • Drug use: No        Family History:   family history includes Thyroid in his sister and sister.      PHYSICAL EXAM:   Vital signs: /80   Pulse 66   Ht  "1.676 m (5' 6\")   Wt 80.6 kg (177 lb 12.8 oz)   BMI 28.70 kg/m²   GENERAL: Well-developed, well-nourished in no apparent distress.   EYE:  No ocular asymmetry, PERRLA.  HENT: Pink, moist mucous membranes.    NECK: Well healed transverse scar, no obvious lumps or nodules.   CARDIOVASCULAR:  No murmurs.  LUNGS: Clear breath sounds.  ABDOMEN: Soft, nontender.  EXTREMITIES: No clubbing, cyanosis, or edema.   NEUROLOGICAL: No gross focal motor abnormalities.  LYMPH: No cervical adenopathy seen.   SKIN: No rashes, lesions.       Labs:  Lab Results   Component Value Date/Time    WBC 6.4 09/14/2020 07:06 AM    RBC 5.11 09/14/2020 07:06 AM    HEMOGLOBIN 16.4 09/14/2020 07:06 AM    MCV 93.0 09/14/2020 07:06 AM    MCH 32.1 09/14/2020 07:06 AM    MCHC 34.5 09/14/2020 07:06 AM    RDW 42.7 09/14/2020 07:06 AM    MPV 10.7 09/14/2020 07:06 AM       Lab Results   Component Value Date/Time    SODIUM 140 09/14/2020 07:06 AM    POTASSIUM 4.0 09/14/2020 07:06 AM    CHLORIDE 103 09/14/2020 07:06 AM    CO2 28 09/14/2020 07:06 AM    ANION 9.0 09/14/2020 07:06 AM    GLUCOSE 96 09/14/2020 07:06 AM    BUN 19 09/14/2020 07:06 AM    CREATININE 1.08 09/14/2020 07:06 AM    CREATININE 1.0 06/30/2007 08:25 AM    CALCIUM 8.0 (L) 09/14/2020 07:06 AM    ASTSGOT 21 09/14/2020 07:06 AM    ALTSGPT 23 09/14/2020 07:06 AM    TBILIRUBIN 0.9 09/14/2020 07:06 AM    ALBUMIN 4.3 09/14/2020 07:06 AM    TOTPROTEIN 7.0 09/14/2020 07:06 AM    GLOBULIN 2.7 09/14/2020 07:06 AM    AGRATIO 1.6 09/14/2020 07:06 AM         ASSESSMENT/PLAN:     1. Papillary carcinoma of thyroid:  Postsurgical hypothyroidism.   Good response to therapy but overdue for imaging  At this time, the patient is clinically and biochemically stable.   Repeat Labs.   US of Neck before next visit.   We reviewed symptoms of under and over replacement of thyroid hormone including fatigue, weight changes, heat or cold intolerance, palpitations, tremor.  He should notify me for difficulty with such " symptoms.     2. Hypothyroidism:   TSh over suppressed but patient is asymptomatic  Since he had a good response to thyroid cancer tx would like to keep TSh between 0.5-2.0  Consider adjusting levothyroxine next time if TSH still low   Continue levothyroxine 100mg QD      3. Vitamin D Deficiency:   Stable  Repeat Labs  Currently taking Vitamin D 2000IU QD.    4. Hypocalcemia  Stable  Repeat labs.  Continue taking Calcium Carb-Cholecalciferol (CALCIUM 600 + D PO) x 2 Daily.     Repeat Labs 1 weeks prior to follow up appointment.   US of Neck prior to next appointment. Order completed in [facility] and patient instructed to call for appointment.   Next follow up appointment in 3 months.   Thank you for allowing me to participate in the thyroid care plan for this patient.    Kelsie Herrmann, APRN  01/11/21    CC:   Rebekah Calix M.D.

## 2021-01-19 ENCOUNTER — OFFICE VISIT (OUTPATIENT)
Dept: MEDICAL GROUP | Facility: PHYSICIAN GROUP | Age: 56
End: 2021-01-19
Payer: COMMERCIAL

## 2021-01-19 VITALS
WEIGHT: 178.13 LBS | SYSTOLIC BLOOD PRESSURE: 126 MMHG | DIASTOLIC BLOOD PRESSURE: 60 MMHG | OXYGEN SATURATION: 98 % | BODY MASS INDEX: 28.63 KG/M2 | HEIGHT: 66 IN | HEART RATE: 75 BPM | TEMPERATURE: 97.5 F

## 2021-01-19 DIAGNOSIS — Z23 NEED FOR SHINGLES VACCINE: ICD-10-CM

## 2021-01-19 DIAGNOSIS — E89.0 POSTSURGICAL HYPOTHYROIDISM: ICD-10-CM

## 2021-01-19 DIAGNOSIS — M25.561 CHRONIC PAIN OF RIGHT KNEE: ICD-10-CM

## 2021-01-19 DIAGNOSIS — E78.5 DYSLIPIDEMIA: ICD-10-CM

## 2021-01-19 DIAGNOSIS — I10 ESSENTIAL HYPERTENSION: ICD-10-CM

## 2021-01-19 DIAGNOSIS — G89.29 CHRONIC PAIN OF RIGHT KNEE: ICD-10-CM

## 2021-01-19 DIAGNOSIS — H91.92 DECREASED HEARING OF LEFT EAR: ICD-10-CM

## 2021-01-19 PROCEDURE — 99214 OFFICE O/P EST MOD 30 MIN: CPT | Mod: 25 | Performed by: FAMILY MEDICINE

## 2021-01-19 PROCEDURE — 90750 HZV VACC RECOMBINANT IM: CPT | Performed by: FAMILY MEDICINE

## 2021-01-19 PROCEDURE — 90471 IMMUNIZATION ADMIN: CPT | Performed by: FAMILY MEDICINE

## 2021-01-19 ASSESSMENT — FIBROSIS 4 INDEX: FIB4 SCORE: 1.48

## 2021-01-19 ASSESSMENT — PATIENT HEALTH QUESTIONNAIRE - PHQ9: CLINICAL INTERPRETATION OF PHQ2 SCORE: 0

## 2021-01-20 NOTE — PROGRESS NOTES
"Subjective:      Tom Olivas Jr. is a 55 y.o. male who presents with Follow-Up            HPI     Patient returns for follow-up of his medical problems.    In terms of his hypertension, we increase the dose of losartan to 100 mg daily in September 2020 which was 4 months ago because the blood pressure was not adequately controlled.  Now the blood pressure is down to goal on the higher dose.  Denies any side effects.    In terms of the dyslipidemia, he continues to take atorvastatin regularly without side effects.    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  He is also followed closely by the endocrinologist.  He is on thyroid replacement.    I had him do x-ray of the right knee because of chronic right knee pain.  X-ray came back no right knee fracture or dislocation and showed mild degenerative change.  I had him proceed with MRI of the knee.  He needed to get the MRI done outside the [facility] imaging because of his insurance he said he did not proceed with the MRI anymore because the meloxicam has been working very well to manage the pain.  He is taking the meloxicam daily without side effects.    He is asking for referral to an audiologist because of decreased hearing out of the left ear.  He said about 25 years ago he must have ruptured his eardrum when he was scuba diving in the [facility] and that started the hearing problem.    Past medical history, past surgical history, family history reviewed-no changes    Social history reviewed-no changes    Allergies reviewed-no changes    Medications reviewed-no changes    ROS     Per HPI, the rest are negative.       Objective:     /60 (BP Location: Left arm, Patient Position: Sitting, BP Cuff Size: Adult)   Pulse 75   Temp 36.4 °C (97.5 °F) (Temporal)   Ht 1.676 m (5' 6\")   Wt 80.8 kg (178 lb 2.1 oz)   SpO2 98%   BMI 28.75 kg/m²      Physical Exam     Examined alert, awake, oriented, not in " distress    Ears-both tympanic membranes intact but dull/opaque, no evidence of perforation.  Neck-supple, no lymphadenopathy, no thyromegaly  Lungs-clear to auscultation, no rales, no wheezes  Heart-regular rate and rhythm, no murmur  Extremities-no edema, clubbing, cyanosis       Hospital Outpatient Visit on 01/05/2021   Component Date Value Ref Range Status   • Vitamin B12 -True Cobalamin 01/05/2021 1193* 211 - 911 pg/mL Final   • 25-Hydroxy   Vitamin D 25 01/05/2021 51  30 - 100 ng/mL Final    Comment: Adult Ranges:   <20 ng/mL - Deficiency  20-29 ng/mL - Insufficiency   ng/mL - Sufficiency  Effective 3/18/2020, this electrochemiluminescence binding assay is  performed using Roche eliz e immunoassay analyzer.  The [employer] Vitamin D  total II assay is intended for the quantitative determination of total 25  hydroxyvitamin D in human serum and plasma. This assay is to be used as an  aid in the assessment of vitamin D sufficiency in adults.     • Thyroglobulin 01/05/2021 0.5* 1.3 - 31.8 ng/mL Final    Comment: INTERPRETIVE INFORMATION: Thyroglobulin, Serum or Plasma  Specimens negative for thyroglobulin antibodies (TgAb) are tested  for thyroglobulin (Tg) by chemiluminescent immunoassay (SCAR) using  the [employer] Access DxI method. Specimens with TgAb results  above the upper reference limit are tested for Tg by  high-performance liquid chromatography-tandem mass spectrometry  (LC-MS/MS). Results obtained with different test methods or kits  cannot be used interchangeably. Tg results, regardless of  concentration, should not be interpreted as absolute evidence for  the presence or absence of papillary or follicular thyroid cancer.  Tg testing is not recommended for use as a screening procedure to  detect the presence of thyroid cancer in the general population.     • Anti-Thyroglobulin 01/05/2021 <0.9  0.0 - 4.0 IU/mL Final    Comment: INTERPRETIVE INFORMATION: Thyroglobulin Antibody  A value of 4.0  IU/mL or less indicates a negative result for  thyroglobulin antibodies.  The Thyroglobulin Antibody assay is being performed using the  [employer] [place] Access DxI method.     • Thyroglobulin by LC-MC/MS-S/P 01/05/2021 Not Applicable  1.3 - 31.8 ng/mL Final    Comment: INTERPRETIVE INFORMATION: Thyroglobulin by LC-MS/MS, Serum/Plasma  Lower limit of detection for Thyroglobulin by LC-MS/MS is 0.5  ng/mL.  Test developed and characteristics determined by [facility]. See Compliance Statement B: [employer]  Performed By: [facility]  [address] [place] [address]  : Belle Henderson MD     • T3 01/05/2021 114.0  60.0 - 181.0 ng/dL Final   • Free T-4 01/05/2021 1.74* 0.93 - 1.70 ng/dL Final    Please note new FT4 reference range effective 4/29/2020.   • T3,Free 01/05/2021 3.37  2.00 - 4.40 pg/mL Final   • TSH 01/05/2021 0.035* 0.380 - 5.330 uIU/mL Final    Comment: Please note new reference ranges effective 12/14/2017 10:00 AM  Pregnant Females, 1st Trimester  0.050-3.700  Pregnant Females, 2nd Trimester  0.310-4.350  Pregnant Females, 3rd Trimester  0.410-5.180          Assessment/Plan:        1. Essential hypertension  This is now under control with higher dose of losartan.    2. Dyslipidemia  His last lipid panel in September 2020 came back all at target.  Continue atorvastatin.  We will do follow-up lipid panel when he gets his blood work done for the endocrinology clinic in April 2021.  - Lipid Profile; Future    3. Postsurgical hypothyroidism  He is slightly suppressed per most recent thyroid function tests but this is where he needs to be because of his history of papillary carcinoma of the thyroid.  Continue follow-up with endocrinology clinic.  Continue current dose of levothyroxine.    4. Chronic pain of right knee  His right knee pain has improved significantly with taking meloxicam daily.  Advised to take the meloxicam with food to avoid gastric irritation.  We  will monitor his kidney function closely.  He has another blood work for CMP to be done in April 2021.    5. Decreased hearing of left ear  Referral placed to audiology for further evaluation and treatment.  - REFERRAL TO AUDIOLOGY    6. Need for shingles vaccine  He was given first dose of Shingrix.  We will give the second dose in 6 months.  Discussed potential side effects and how to manage them.  - Shingles Vaccine (Shingrix)      Please note that this dictation was created using voice recognition software. I have worked with consultants from the vendor as well as technical experts from [facility] to optimize the interface. I have made every reasonable attempt to correct obvious errors, but I expect that there are errors of grammar and possibly content I did not discover before finalizing the note.

## 2021-01-25 ENCOUNTER — HOSPITAL ENCOUNTER (OUTPATIENT)
Dept: RADIOLOGY | Facility: MEDICAL CENTER | Age: 56
End: 2021-01-25
Attending: NURSE PRACTITIONER
Payer: COMMERCIAL

## 2021-01-25 DIAGNOSIS — C73 PAPILLARY CARCINOMA OF THYROID (HCC): ICD-10-CM

## 2021-01-25 DIAGNOSIS — E89.0 POSTOPERATIVE HYPOTHYROIDISM: ICD-10-CM

## 2021-01-25 PROCEDURE — 76536 US EXAM OF HEAD AND NECK: CPT

## 2021-02-18 ENCOUNTER — OCCUPATIONAL MEDICINE (OUTPATIENT)
Dept: URGENT CARE | Facility: PHYSICIAN GROUP | Age: 56
End: 2021-02-18
Payer: COMMERCIAL

## 2021-02-18 VITALS
WEIGHT: 170 LBS | BODY MASS INDEX: 27.32 KG/M2 | OXYGEN SATURATION: 98 % | HEIGHT: 66 IN | RESPIRATION RATE: 18 BRPM | HEART RATE: 70 BPM | DIASTOLIC BLOOD PRESSURE: 80 MMHG | SYSTOLIC BLOOD PRESSURE: 130 MMHG | TEMPERATURE: 97.8 F

## 2021-02-18 DIAGNOSIS — S61.012D THUMB LACERATION, LEFT, SUBSEQUENT ENCOUNTER: ICD-10-CM

## 2021-02-18 PROCEDURE — 99213 OFFICE O/P EST LOW 20 MIN: CPT | Performed by: NURSE PRACTITIONER

## 2021-02-18 ASSESSMENT — FIBROSIS 4 INDEX: FIB4 SCORE: 1.48

## 2021-02-18 NOTE — LETTER
[facility] [place]  [address]  [place] [address]  Phone:  [phone] - Fax:  [phone]   [facility] Progress Report and Disability Certification  Date of Service: 2/18/2021   No Show:  No  Date / Time of Next Visit: 2/22/2021   Claim Information   Patient Name: Don Olivas Jr.  Claim Number:     Employer: [employer]  Date of Injury: 2/13/2021     Insurer / TPA: [employer]  ID / SSN:     Occupation: Production  Diagnosis: The encounter diagnosis was Thumb laceration, left, subsequent encounter.    Medical Information   Related to Industrial Injury? Yes    Subjective Complaints:  Date of injury: 2/15/2021  Patient was originally seen at [facility] for laceration repair after he sustained a work-related laceration to his left thumb while reaching for a broken latch.  Presents today for wound check and possible suture removal however it has only been 5 days since the incident.  Nuys any numbness or tingling in the thumb.  He has full range of motion.  Denies any drainage, redness or pain.   Objective Findings: Left thumb: Well-healing laceration with sutures in place.  No surrounding erythema, warmth or drainage noted.  Neurovascular intact.  Full range of motion.   Pre-Existing Condition(s): None   Assessment:   Condition Improved    Status: Additional Care Required  Permanent Disability:No    Plan:      Diagnostics:      Comments:       Disability Information   Status: Released to Full Duty    From:  2/18/2021  Through: 2/22/2021 Restrictions are:     Physical Restrictions   Sitting:    Standing:    Stooping:    Bending:      Squatting:    Walking:    Climbing:    Pushing:      Pulling:    Other:    Reaching Above Shoulder (L):   Reaching Above Shoulder (R):       Reaching Below Shoulder (L):    Reaching Below Shoulder (R):      Not to exceed Weight Limits   Carrying(hrs):   Weight Limit(lb):   Lifting(hrs):   Weight  Limit(lb):      Comments: Return in 4 days for suture removal.  Keep wound clean and dry.  Wound care instructions.    Repetitive Actions   Hands: i.e. Fine Manipulations from Grasping:     Feet: i.e. Operating Foot Controls:     Driving / Operate Machinery:     Provider Name:   EFREM Hamilton Physician Signature:  Physician Name:     Clinic Name / Location: [address] [address] Clinic Phone Number: Dept: [phone]   Appointment Time: 5:00 Pm Visit Start Time: 5:00 PM   Check-In Time:  4:48 Pm Visit Discharge Time:  5:26p     Original-Treating Physician or Chiropractor    Page 2-Insurer/TPA    Page 3-Employer    Page 4-Employee

## 2021-02-19 ASSESSMENT — ENCOUNTER SYMPTOMS
NAUSEA: 0
MYALGIAS: 0
SHORTNESS OF BREATH: 0
TINGLING: 0
FEVER: 0
CHILLS: 0
VOMITING: 0

## 2021-02-19 NOTE — PROGRESS NOTES
"Subjective:      Tom Olivas Jr. is a 55 y.o. male who presents with Suture / Staple Removal (x5 days. WC FV suture removal on thumb)      Date of injury: 2/15/2021  Patient was originally seen at [facility] for laceration repair after he sustained a work-related laceration to his left thumb while reaching for a broken latch.  Presents today for wound check and possible suture removal however it has only been 5 days since the incident.  Nuys any numbness or tingling in the thumb.  He has full range of motion.  Denies any drainage, redness or pain.         Review of Systems   Constitutional: Negative for chills and fever.   Respiratory: Negative for shortness of breath.    Cardiovascular: Negative for chest pain.   Gastrointestinal: Negative for nausea and vomiting.   Musculoskeletal: Negative for myalgias.   Skin: Negative for rash.        +L thumb laceration (2/15/21 DOI)   Neurological: Negative for tingling.   All other systems reviewed and are negative.         Objective:     /80   Pulse 70   Temp 36.6 °C (97.8 °F) (Temporal)   Resp 18   Ht 1.676 m (5' 6\")   Wt 77.1 kg (170 lb)   SpO2 98%   BMI 27.44 kg/m²      Physical Exam  Vitals reviewed.   Constitutional:       General: He is not in acute distress.     Appearance: Normal appearance. He is not ill-appearing.   HENT:      Head: Normocephalic.      Right Ear: External ear normal.      Left Ear: External ear normal.      Nose: Nose normal.      Mouth/Throat:      Mouth: Mucous membranes are moist.   Eyes:      Extraocular Movements: Extraocular movements intact.      Conjunctiva/sclera: Conjunctivae normal.   Cardiovascular:      Rate and Rhythm: Normal rate and regular rhythm.      Pulses: Normal pulses.   Pulmonary:      Effort: Pulmonary effort is normal.   Abdominal:      Palpations: Abdomen is soft.   Musculoskeletal:         General: Normal range of motion.      Left hand: Laceration present.        Hands:       " Cervical back: Normal range of motion.   Skin:     General: Skin is warm and dry.      Capillary Refill: Capillary refill takes less than 2 seconds.   Neurological:      Mental Status: He is alert and oriented to person, place, and time.   Psychiatric:         Mood and Affect: Mood normal.         Behavior: Behavior normal.         Left thumb: Well-healing laceration with sutures in place.  No surrounding erythema, warmth or drainage noted.  Neurovascular intact.  Full range of motion.       Assessment/Plan:        1. Thumb laceration, left, subsequent encounter       Well healing thumb lac with sutures in place.   S/P suture placement 5 days ago  He will return in 4 days for suture removal and expect d/c MMI

## 2021-02-22 ENCOUNTER — OCCUPATIONAL MEDICINE (OUTPATIENT)
Dept: URGENT CARE | Facility: PHYSICIAN GROUP | Age: 56
End: 2021-02-22
Payer: COMMERCIAL

## 2021-02-22 VITALS
HEART RATE: 78 BPM | OXYGEN SATURATION: 97 % | RESPIRATION RATE: 14 BRPM | TEMPERATURE: 97.6 F | HEIGHT: 66 IN | DIASTOLIC BLOOD PRESSURE: 84 MMHG | SYSTOLIC BLOOD PRESSURE: 122 MMHG | WEIGHT: 170 LBS | BODY MASS INDEX: 27.32 KG/M2

## 2021-02-22 DIAGNOSIS — S61.012D THUMB LACERATION, LEFT, SUBSEQUENT ENCOUNTER: ICD-10-CM

## 2021-02-22 PROCEDURE — 99214 OFFICE O/P EST MOD 30 MIN: CPT | Performed by: NURSE PRACTITIONER

## 2021-02-22 RX ORDER — CHLORHEXIDINE GLUCONATE ORAL RINSE 1.2 MG/ML
SOLUTION DENTAL
COMMUNITY
Start: 2021-01-12 | End: 2022-05-10

## 2021-02-22 ASSESSMENT — PAIN SCALES - GENERAL: PAINLEVEL: NO PAIN

## 2021-02-22 ASSESSMENT — FIBROSIS 4 INDEX: FIB4 SCORE: 1.48

## 2021-02-22 NOTE — LETTER
[facility] [place]  [address]  [place] [address]  Phone:  [phone] - Fax:  [phone]   [facility] Progress Report and Disability Certification  Date of Service: 2/22/2021   No Show:  No  Date / Time of Next Visit: 2/26/2021   Claim Information   Patient Name: Don Olivas Jr.  Claim Number:     Employer: [employer]  Date of Injury: 2/13/2021     Insurer / TPA: [employer]  ID / SSN:     Occupation: Production  Diagnosis: The encounter diagnosis was Thumb laceration, left, subsequent encounter.    Medical Information   Related to Industrial Injury? Yes    Subjective Complaints:  Date of injury 2/15/2021:Patient was at work when he accidentally lacerated his left thumb while reaching for a broken latch.  He was originally seen at [facility] ED, sutured. Presents today for wound check and possible suture removal. He is feeling better and feels the wound is healing well. Denies fever, chills, weakness, numbness, tingling, drainage, redness or pain. He has not been at work, has been off for previously planned vacation. Tdap is UTD per patient.    Objective Findings: A/Ox4. NAD. Left thumb: Laceration present, edges , wound moist, 7 sutures in place.  No surrounding erythema, warmth or drainage noted.  Neurovascular intact.  Full range of motion.   Pre-Existing Condition(s): Denies    Assessment:   Condition Improved    Status: Additional Care Required  Permanent Disability:No    Plan: Medication  Comments:Sutures removed, wound care, work restrictions, RTC 4 days.     Diagnostics:   Comments:N/A    Comments:       Disability Information   Status: Released to Restricted Duty    From:  2/22/2021  Through: 2/26/2021 Restrictions are: Temporary   Physical Restrictions   Sitting:    Standing:    Stooping:    Bending:      Squatting:    Walking:    Climbing:    Pushing:  < or = to 1 hr/day  Comments:left hand   Pulling:  < or = to 1  hr/day  Comments:left hand Other:    Reaching Above Shoulder (L):   Reaching Above Shoulder (R):       Reaching Below Shoulder (L):    Reaching Below Shoulder (R):      Not to exceed Weight Limits   Carrying(hrs):   Comments:left hand Weight Limit(lb): < or = to 10 pounds Lifting(hrs):   Weight  Limit(lb): < or = to 10 pounds  Comments:left hand   Comments:      Repetitive Actions   Hands: i.e. Fine Manipulations from Grasping: < or = to 1 hr/day  Comments:left hand   Feet: i.e. Operating Foot Controls:     Driving / Operate Machinery:     Provider Name:   EFREM Vega Physician Signature:  Physician Name:     Clinic Name / Location: [address] [address] Clinic Phone Number: Dept: [phone]   Appointment Time: 8:30 Am Visit Start Time: 8:33 AM   Check-In Time:  8:25 Am Visit Discharge Time: 9:00 AM   Original-Treating Physician or Chiropractor    Page 2-Insurer/TPA    Page 3-Employer    Page 4-Employee

## 2021-02-22 NOTE — PROGRESS NOTES
"  Chief Complaint   Patient presents with   • Work-Related Injury     DOI 2/15 L thumb lac better        HISTORY OF PRESENT ILLNESS: Patient is a 55 y.o. male who presents to urgent care today with a work comp follow up. Date of injury 2/15/2021:Patient was at work when he accidentally lacerated his left thumb while reaching for a broken latch.  He was originally seen at [facility] ED, sutured. Presents today for wound check and possible suture removal. He is feeling better and feels the wound is healing well. Denies fever, chills, weakness, numbness, tingling, drainage, redness or pain. He has not been at work, has been off for previously planned vacation. Tdap is UTD per patient.         PMH: No pertinent past medical history to this problem  MEDS: Medications were reviewed in Epic  ALLERGIES: Allergies were reviewed in Epic  SOCHX: Works at [employer]  FH: No pertinent family history to this problem      ROS:  Review of Systems   Constitutional: Negative for fever, chills, weight loss, malaise, and fatigue.   HENT: Negative for ear pain, nosebleeds, congestion, sore throat and neck pain.    Eyes: Negative for vision changes.   Neuro: Negative for headache, sensory changes, weakness, seizure, LOC.   Cardiovascular: Negative for chest pain, palpitations, orthopnea and leg swelling.   Respiratory: Negative for cough, sputum production, shortness of breath and wheezing.   Gastrointestinal: Negative for abdominal pain, nausea, vomiting or diarrhea.   Genitourinary: Negative for dysuria, urgency and frequency.  Musculoskeletal: Negative for falls, neck pain, back pain, joint pain, myalgias.   Skin: Positive for laceration. Negative for rash, diaphoresis.     Exam:  /84   Pulse 78   Temp 36.4 °C (97.6 °F) (Temporal)   Resp 14   Ht 1.676 m (5' 6\")   Wt 77.1 kg (170 lb)   SpO2 97%   General: well-nourished, well-developed male in NAD  Head: normocephalic, atraumatic  Eyes: PERRLA, no conjunctival injection, " acuity grossly intact, lids normal.  Ears: normal shape and symmetry, no tenderness, no discharge. External canals are without any significant edema or erythema. Tympanic membranes are without any inflammation, no effusion. Gross auditory acuity is intact.  Nose: symmetrical without tenderness, no discharge.  Mouth/Throat: reasonable hygiene, no erythema, exudates or tonsillar enlargement.  Neck: no masses, range of motion within normal limits, no tracheal deviation. No obvious thyroid enlargement.   Lymph: no cervical adenopathy. No supraclavicular adenopathy.   Neuro: alert and oriented. Cranial nerves 1-12 grossly intact. No sensory deficit.   Cardiovascular: regular rate and rhythm. No edema.  Pulmonary: no distress. Chest is symmetrical with respiration, no wheezes, crackles, or rhonchi.   Musculoskeletal: no clubbing, appropriate muscle tone, gait is stable. Left thumb: Laceration present, edges , wound moist, 7 sutures in place.  No surrounding erythema, warmth or drainage noted.  Neurovascular intact.  Full range of motion.  Skin: warm, no clubbing, no cyanosis, no rashes. +Laceration.   Psych: appropriate mood, affect, judgement.         Assessment/Plan:  1. Thumb laceration, left, subsequent encounter         Sutures removed with some difficulty as many were embedded, 7 removed (7 were placed per patient), steri strips applied. Wound care discussed, work restrictions, RTC 4 days for potential MMI.   Supportive care, differential diagnoses, and indications for immediate follow-up discussed with patient.   Pathogenesis of diagnosis discussed including typical length and natural progression.   Instructed to return to clinic or nearest emergency department sooner for any change in condition, further concerns, or worsening of symptoms.  Patient states understanding of the plan of care and discharge instructions.          Please note that this dictation was created using voice recognition software. I  have made every reasonable attempt to correct obvious errors, but I expect that there are errors of grammar and possibly content that I did not discover before finalizing the note.      KENDAL Vega.

## 2021-02-26 ENCOUNTER — OCCUPATIONAL MEDICINE (OUTPATIENT)
Dept: URGENT CARE | Facility: PHYSICIAN GROUP | Age: 56
End: 2021-02-26
Payer: COMMERCIAL

## 2021-02-26 VITALS
BODY MASS INDEX: 27.32 KG/M2 | RESPIRATION RATE: 16 BRPM | DIASTOLIC BLOOD PRESSURE: 78 MMHG | TEMPERATURE: 97.8 F | HEART RATE: 80 BPM | WEIGHT: 170 LBS | HEIGHT: 66 IN | OXYGEN SATURATION: 96 % | SYSTOLIC BLOOD PRESSURE: 122 MMHG

## 2021-02-26 DIAGNOSIS — S61.012D THUMB LACERATION, LEFT, SUBSEQUENT ENCOUNTER: ICD-10-CM

## 2021-02-26 PROCEDURE — 99213 OFFICE O/P EST LOW 20 MIN: CPT | Performed by: PHYSICIAN ASSISTANT

## 2021-02-26 ASSESSMENT — FIBROSIS 4 INDEX: FIB4 SCORE: 1.48

## 2021-02-26 NOTE — LETTER
[facility] [address] [place] [address]  Phone:  [phone] - Fax:  [phone]   [facility] Progress Report and Disability Certification  Date of Service: 2/26/2021   No Show:  No  Date / Time of Next Visit:     Claim Information   Patient Name: Don Olivas Jr.  Claim Number:     Employer: [employer]  Date of Injury: 2/13/2021     Insurer / TPA: [employer]  ID / SSN:     Occupation: Production  Diagnosis: The encounter diagnosis was Thumb laceration, left, subsequent encounter.    Medical Information   Related to Industrial Injury? Yes    Subjective Complaints:  HPI: (From 2/18/21)  Date of injury: 2/15/2021  Patient was originally seen at [facility] for laceration repair after he sustained a work-related laceration to his left thumb while reaching for a broken latch.  Presents today for wound check and possible suture removal however it has only been 5 days since the incident.  Nuys any numbness or tingling in the thumb.  He has full range of motion.  Denies any drainage, redness or pain.        HPI: (Today 2/26/21)  This is a very pleasant 55-year-old male presenting to the clinic for follow-up regarding a work-related injury.  The patient was last seen in clinic on 2/22/2021 for suture removal.  At that time his wound slightly reopened.  Steri-Strips were placed.  He has been following all wound care instructions.  Lately he denies any drainage or surrounding redness.  He has full and pain-free range of motion.  Denies any numbness or tingling distally.  Believes he is able to return to full duty.       PMH:   No pertinent past medical history to this problem  MEDS:  Medications were reviewed in EMR  ALLERGIES:  Allergies were reviewed in EMR  SOCHX:  Works at [employer]  FH:   No pertinent family history to this problem       Review of Systems   Constitutional: Negative for chills and fever.   Respiratory: Negative for  shortness of breath.    Cardiovascular: Negative for chest pain.   Gastrointestinal: Negative for nausea and vomiting.   Musculoskeletal: Negative for myalgias.   Skin: Negative for rash.        +L thumb laceration (2/15/21 DOI)   Neurological: Negative for tingling.   All other systems reviewed and are negative.      Objective Findings: Constitutional: Pt is oriented to person, place, and time.  Appears well-developed and well-nourished. No distress.   Eyes: Conjunctivae are normal.   Cardiovascular: Normal rate.    Pulmonary/Chest: Effort normal.   Musculoskeletal: Left thumb: Nicely healed laceration to the palmar aspect of the left thumb.  There is no drainage present.  No surrounding erythema.  No swelling.  Patient demonstrates full and pain-free range of motion.  Sensation is intact distally.  Capillary refill less than 2 seconds.  Neurological: Pt is alert and oriented to person, place, and time. Coordination normal.   Skin: Skin is warm. Pt is not diaphoretic. No erythema.   Psychiatric: Pt has a normal mood and affect.  Behavior is normal.      Pre-Existing Condition(s):     Assessment:   Condition Improved    Status: Discharged /  MMI  Permanent Disability:No    Plan:      Diagnostics:      Comments:       Disability Information   Status: Released to Full Duty    From:  2/26/2021  Through:   Restrictions are:     Physical Restrictions   Sitting:    Standing:    Stooping:    Bending:      Squatting:    Walking:    Climbing:    Pushing:      Pulling:    Other:    Reaching Above Shoulder (L):   Reaching Above Shoulder (R):       Reaching Below Shoulder (L):    Reaching Below Shoulder (R):      Not to exceed Weight Limits   Carrying(hrs):   Weight Limit(lb):   Lifting(hrs):   Weight  Limit(lb):     Comments: Patient is cleared to return to full duty.  Encouraged to continue to keep a clean dressing on the thumb while at work until the scab has completely healed.  Discussed signs of infection with the patient  today.  If any these present return to the clinic for further evaluation.  Encouraged to call with any questions or concerns.  Discharge MMI.    Repetitive Actions   Hands: i.e. Fine Manipulations from Grasping:     Feet: i.e. Operating Foot Controls:     Driving / Operate Machinery:     Provider Name:   Den De Oliveira P.A.-C. Physician Signature:  Physician Name:     Clinic Name / Location: [address] [address] Clinic Phone Number: Dept: [phone]   Appointment Time: 8:00 Am Visit Start Time: 8:10 AM   Check-In Time:  7:39 Am Visit Discharge Time: 8:50 AM   Original-Treating Physician or Chiropractor    Page 2-Insurer/TPA    Page 3-Employer    Page 4-Employee

## 2021-02-26 NOTE — PROGRESS NOTES
"Subjective:      Don Olivas Jr. is a 55 y.o. male who presents with Laceration (x WC FV, finger lac )      HPI: (From 2/18/21)  Date of injury: 2/15/2021  Patient was originally seen at [facility] for laceration repair after he sustained a work-related laceration to his left thumb while reaching for a broken latch.  Presents today for wound check and possible suture removal however it has only been 5 days since the incident.  Nuys any numbness or tingling in the thumb.  He has full range of motion.  Denies any drainage, redness or pain.        HPI: (Today 2/26/21)  This is a very pleasant 55-year-old male presenting to the clinic for follow-up regarding a work-related injury.  The patient was last seen in clinic on 2/22/2021 for suture removal.  At that time his wound slightly reopened.  Steri-Strips were placed.  He has been following all wound care instructions.  Lately he denies any drainage or surrounding redness.  He has full and pain-free range of motion.  Denies any numbness or tingling distally.  Believes he is able to return to full duty.       PMH:   No pertinent past medical history to this problem  MEDS:  Medications were reviewed in EMR  ALLERGIES:  Allergies were reviewed in EMR  SOCHX:  Works at [employer]  FH:   No pertinent family history to this problem       Review of Systems   Constitutional: Negative for chills and fever.   Respiratory: Negative for shortness of breath.    Cardiovascular: Negative for chest pain.   Gastrointestinal: Negative for nausea and vomiting.   Musculoskeletal: Negative for myalgias.   Skin: Negative for rash.        +L thumb laceration (2/15/21 DOI)   Neurological: Negative for tingling.   All other systems reviewed and are negative.           Objective:     /78   Pulse 80   Temp 36.6 °C (97.8 °F) (Temporal)   Resp 16   Ht 1.676 m (5' 6\")   Wt 77.1 kg (170 lb)   SpO2 96%   BMI 27.44 kg/m²      Physical Exam    Constitutional: Pt is " oriented to person, place, and time.  Appears well-developed and well-nourished. No distress.   Eyes: Conjunctivae are normal.   Cardiovascular: Normal rate.    Pulmonary/Chest: Effort normal.   Musculoskeletal: Left thumb: Nicely healed laceration to the palmar aspect of the left thumb.  There is no drainage present.  No surrounding erythema.  No swelling.  Patient demonstrates full and pain-free range of motion.  Sensation is intact distally.  Capillary refill less than 2 seconds.  Neurological: Pt is alert and oriented to person, place, and time. Coordination normal.   Skin: Skin is warm. Pt is not diaphoretic. No erythema.   Psychiatric: Pt has a normal mood and affect.  Behavior is normal.          Assessment/Plan:        1. Thumb laceration, left, subsequent encounter    Patient is cleared to return to full duty.  Encouraged to continue to keep a clean dressing on the thumb while at work until the scab has completely healed.  Discussed signs of infection with the patient today.  If any these present return to the clinic for further evaluation.  Encouraged to call with any questions or concerns.  Discharge MMI.

## 2021-03-15 DIAGNOSIS — Z23 NEED FOR VACCINATION: ICD-10-CM

## 2021-04-06 ENCOUNTER — HOSPITAL ENCOUNTER (OUTPATIENT)
Dept: LAB | Facility: MEDICAL CENTER | Age: 56
End: 2021-04-06
Attending: NURSE PRACTITIONER
Payer: COMMERCIAL

## 2021-04-06 ENCOUNTER — HOSPITAL ENCOUNTER (OUTPATIENT)
Dept: LAB | Facility: MEDICAL CENTER | Age: 56
End: 2021-04-06
Attending: FAMILY MEDICINE
Payer: COMMERCIAL

## 2021-04-06 DIAGNOSIS — E55.9 VITAMIN D DEFICIENCY: ICD-10-CM

## 2021-04-06 DIAGNOSIS — C73 PAPILLARY CARCINOMA OF THYROID (HCC): ICD-10-CM

## 2021-04-06 DIAGNOSIS — E78.5 DYSLIPIDEMIA: ICD-10-CM

## 2021-04-06 DIAGNOSIS — E89.0 POSTOPERATIVE HYPOTHYROIDISM: ICD-10-CM

## 2021-04-06 LAB
ALBUMIN SERPL BCP-MCNC: 4.4 G/DL (ref 3.2–4.9)
ALBUMIN/GLOB SERPL: 1.5 G/DL
ALP SERPL-CCNC: 71 U/L (ref 30–99)
ALT SERPL-CCNC: 29 U/L (ref 2–50)
ANION GAP SERPL CALC-SCNC: 7 MMOL/L (ref 7–16)
AST SERPL-CCNC: 35 U/L (ref 12–45)
BILIRUB SERPL-MCNC: 0.8 MG/DL (ref 0.1–1.5)
BUN SERPL-MCNC: 21 MG/DL (ref 8–22)
CA-I SERPL-SCNC: 1.1 MMOL/L (ref 1.1–1.3)
CHLORIDE SERPL-SCNC: 103 MMOL/L (ref 96–112)
CHOLEST SERPL-MCNC: 142 MG/DL (ref 100–199)
CO2 SERPL-SCNC: 28 MMOL/L (ref 20–33)
CREAT SERPL-MCNC: 1.21 MG/DL (ref 0.5–1.4)
FASTING STATUS PATIENT QL REPORTED: NORMAL
FASTING STATUS PATIENT QL REPORTED: NORMAL
GLOBULIN SER CALC-MCNC: 3 G/DL (ref 1.9–3.5)
GLUCOSE SERPL-MCNC: 103 MG/DL (ref 65–99)
HDLC SERPL-MCNC: 49 MG/DL
LDLC SERPL CALC-MCNC: 77 MG/DL
MAGNESIUM SERPL-MCNC: 2.2 MG/DL (ref 1.5–2.5)
PHOSPHATE SERPL-MCNC: 4.4 MG/DL (ref 2.5–4.5)
POTASSIUM SERPL-SCNC: 4.3 MMOL/L (ref 3.6–5.5)
PROT SERPL-MCNC: 7.4 G/DL (ref 6–8.2)
SODIUM SERPL-SCNC: 138 MMOL/L (ref 135–145)
TRIGL SERPL-MCNC: 79 MG/DL (ref 0–149)

## 2021-04-06 PROCEDURE — 83970 ASSAY OF PARATHORMONE: CPT

## 2021-04-06 PROCEDURE — 84100 ASSAY OF PHOSPHORUS: CPT

## 2021-04-06 PROCEDURE — 80053 COMPREHEN METABOLIC PANEL: CPT

## 2021-04-06 PROCEDURE — 82330 ASSAY OF CALCIUM: CPT

## 2021-04-06 PROCEDURE — 36415 COLL VENOUS BLD VENIPUNCTURE: CPT

## 2021-04-06 PROCEDURE — 84439 ASSAY OF FREE THYROXINE: CPT

## 2021-04-06 PROCEDURE — 84443 ASSAY THYROID STIM HORMONE: CPT

## 2021-04-06 PROCEDURE — 82306 VITAMIN D 25 HYDROXY: CPT

## 2021-04-06 PROCEDURE — 80061 LIPID PANEL: CPT

## 2021-04-06 PROCEDURE — 83735 ASSAY OF MAGNESIUM: CPT

## 2021-04-07 LAB
25(OH)D3 SERPL-MCNC: 55 NG/ML (ref 30–80)
CALCIUM SERPL-MCNC: 8.4 MG/DL (ref 8.5–10.5)
PTH-INTACT SERPL-MCNC: 10.1 PG/ML (ref 14–72)
T4 FREE SERPL-MCNC: 1.73 NG/DL (ref 0.93–1.7)
TSH SERPL DL<=0.005 MIU/L-ACNC: 0.07 UIU/ML (ref 0.38–5.33)

## 2021-04-08 DIAGNOSIS — I10 ESSENTIAL HYPERTENSION: ICD-10-CM

## 2021-04-08 RX ORDER — LOSARTAN POTASSIUM 100 MG/1
TABLET ORAL
Qty: 90 TABLET | Refills: 1 | Status: SHIPPED | OUTPATIENT
Start: 2021-04-08 | End: 2021-10-08

## 2021-04-21 DIAGNOSIS — G47.00 INSOMNIA, UNSPECIFIED TYPE: ICD-10-CM

## 2021-04-22 RX ORDER — ZOLPIDEM TARTRATE 10 MG/1
10 TABLET ORAL NIGHTLY PRN
Qty: 30 TABLET | Refills: 0 | Status: SHIPPED | OUTPATIENT
Start: 2021-04-22 | End: 2021-05-24

## 2021-07-07 ENCOUNTER — OFFICE VISIT (OUTPATIENT)
Dept: MEDICAL GROUP | Facility: PHYSICIAN GROUP | Age: 56
End: 2021-07-07
Payer: COMMERCIAL

## 2021-07-07 VITALS
DIASTOLIC BLOOD PRESSURE: 80 MMHG | OXYGEN SATURATION: 96 % | HEIGHT: 66 IN | WEIGHT: 180.34 LBS | SYSTOLIC BLOOD PRESSURE: 126 MMHG | HEART RATE: 68 BPM | TEMPERATURE: 98.1 F | BODY MASS INDEX: 28.98 KG/M2

## 2021-07-07 DIAGNOSIS — I10 ESSENTIAL HYPERTENSION: ICD-10-CM

## 2021-07-07 DIAGNOSIS — E78.5 DYSLIPIDEMIA: ICD-10-CM

## 2021-07-07 DIAGNOSIS — M25.562 ACUTE PAIN OF LEFT KNEE: ICD-10-CM

## 2021-07-07 DIAGNOSIS — G47.00 INSOMNIA, UNSPECIFIED TYPE: ICD-10-CM

## 2021-07-07 DIAGNOSIS — Z12.5 SCREENING FOR PROSTATE CANCER: ICD-10-CM

## 2021-07-07 DIAGNOSIS — E89.0 POSTSURGICAL HYPOTHYROIDISM: ICD-10-CM

## 2021-07-07 PROCEDURE — 99214 OFFICE O/P EST MOD 30 MIN: CPT | Performed by: FAMILY MEDICINE

## 2021-07-07 ASSESSMENT — FIBROSIS 4 INDEX: FIB4 SCORE: 2.19

## 2021-07-07 NOTE — PROGRESS NOTES
Subjective:      Don Olivas Jr. is a 55 y.o. male who presents with Pain (knee)            HPI     Patient is here for follow-up of his medical problems as well as for left knee pain.    He said he started having left knee pain about a month ago.  He was in [place] and did a lot of walking prior to the onset of the pain.  He went to urgent care at [facility] urgent care in [place], [place] on June 27, 2021.  He said they did x-ray of the knee and he was told he will need a steroid injection.  He was referred to [facility] but decided to come and see me after the negotiation with his insurance has been resolved with our health system.  He said 4 days ago while he was trying to put on his underwear while he was standing, he heard a loud crack in the left knee and he fell down to the floor.  He said he had a hard time walking because of the pain.  When he has even minimal twisting of the knee causes significant pain.  At urgent care he was given ibuprofen 800 mg which she was taking 1 a day without help.  He switched to Tylenol 1000 mg a day which she said is working better.  The meloxicam I gave him in the past for right knee pain is not working for him.  He has been using the knee brace since then.    In terms of his hypertension, he continues to take losartan 100 mg daily with good control of his blood pressure.    For the dyslipidemia, he continues to take atorvastatin without myalgias.  The last lipid panel was in April 2021 that came back all at target.    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  He is followed by [facility].    For his insomnia, he continues to take zolpidem every night with good results.  The last urine drug screen was in September 2020 and zolpidem was not detected in the system and there were no recreational drugs or illegal drugs.    Past medical history, past surgical history, family  "history reviewed-no changes    Social history reviewed-no changes    Allergies reviewed-no changes    Medications reviewed-no changes    ROS     As per HPI, the rest are negative.       Objective:     /80 (BP Location: Left arm, Patient Position: Sitting, BP Cuff Size: Adult)   Pulse 68   Temp 36.7 °C (98.1 °F) (Temporal)   Ht 1.676 m (5' 6\")   Wt 81.8 kg (180 lb 5.4 oz)   SpO2 96%   BMI 29.11 kg/m²      Physical Exam   Examined alert, awake, oriented, not in distress    Neck-supple, no lymphadenopathy, no thyromegaly  Lungs-clear to auscultation, no rales, no wheezes  Heart-regular rate and rhythm, no murmur  Extremities-no edema, clubbing, cyanosis, left knee exam-no effusion, no swelling, no redness, no pinpoint tenderness, he has full range of movement on passive flexion and extension without any laxity or instability            Hospital Outpatient Visit on 04/06/2021   Component Date Value Ref Range Status   • Pth, Intact 04/06/2021 10.1* 14.0 - 72.0 pg/mL Final   • Calcium 04/06/2021 8.4* 8.5 - 10.5 mg/dL Final   • Ionized Calcium 04/06/2021 1.1  1.1 - 1.3 mmol/L Final    Comment: *Some rare spurious ionized calcium results have been identified in current  lots of the EG7+ cartridge. Please take into account all patient's symptoms,  history, and other diagnostics tests when interpreting. If the results do  not match the patient's clinical presentation, please collect a venous  sample and send to the lab for testing.     • Free T-4 04/06/2021 1.73* 0.93 - 1.70 ng/dL Final    Please note new FT4 reference range effective 4/29/2020.   • TSH 04/06/2021 0.070* 0.380 - 5.330 uIU/mL Final    Comment: Please note new reference ranges effective 12/14/2017 10:00 AM  Pregnant Females, 1st Trimester  0.050-3.700  Pregnant Females, 2nd Trimester  0.310-4.350  Pregnant Females, 3rd Trimester  0.410-5.180     • Sodium 04/06/2021 138  135 - 145 mmol/L Final   • Potassium 04/06/2021 4.3  3.6 - 5.5 mmol/L Final   • " Chloride 04/06/2021 103  96 - 112 mmol/L Final   • Co2 04/06/2021 28  20 - 33 mmol/L Final   • Anion Gap 04/06/2021 7.0  7.0 - 16.0 Final   • Glucose 04/06/2021 103* 65 - 99 mg/dL Final   • Bun 04/06/2021 21  8 - 22 mg/dL Final   • Creatinine 04/06/2021 1.21  0.50 - 1.40 mg/dL Final   • AST(SGOT) 04/06/2021 35  12 - 45 U/L Final   • ALT(SGPT) 04/06/2021 29  2 - 50 U/L Final   • Alkaline Phosphatase 04/06/2021 71  30 - 99 U/L Final   • Total Bilirubin 04/06/2021 0.8  0.1 - 1.5 mg/dL Final   • Albumin 04/06/2021 4.4  3.2 - 4.9 g/dL Final   • Total Protein 04/06/2021 7.4  6.0 - 8.2 g/dL Final   • Globulin 04/06/2021 3.0  1.9 - 3.5 g/dL Final   • A-G Ratio 04/06/2021 1.5  g/dL Final   • Phosphorus 04/06/2021 4.4  2.5 - 4.5 mg/dL Final   • 25-Hydroxy   Vitamin D 25 04/06/2021 55  30 - 80 ng/mL Final    Comment: INTERPRETIVE INFORMATION: Vitamin D, 25-Hydroxy  This assay accurately quantifies the sum of vitamin D3, 25-hydroxy  and vitamin D2, 25-hydroxy.  0-17 years:  Deficiency: less than 20 ng/mL  Optimum level: greater than or equal to 20 ng/mL*  *(Santos CL et al. Pediatrics 2008; 122: 1142-52.)  18 years and older:  Deficiency: Less than 20 ng/mL  Insufficiency: 20-29 ng/mL  Optimum Level: 30-80 ng/mL  Possible Toxicity: Greater than 150 ng/mL  Performed By: [facility]  [address] [place] [id number]  : Belle Henderson MD     • Magnesium 04/06/2021 2.2  1.5 - 2.5 mg/dL Final   • Fasting Status 04/06/2021 Fasting   Final   • GFR If  04/06/2021 >60  >60 mL/min/1.73 m 2 Final   • GFR If Non  04/06/2021 >60  >60 mL/min/1.73 m 2 Final            Assessment/Plan:        1. Acute pain of left knee  I told him I will not proceed with steroid injection until I get records from urgent care including the x-ray results.  I will communicate with him once I get the results and we can get him in another time for the steroid injection if needed depending on the  x-ray result.  In the meantime he can continue taking Tylenol as needed.  Advised warm compresses.  Continue wearing the knee brace.    2. Essential hypertension  Controlled on losartan.  - Lipid Profile; Future  - Comp Metabolic Panel; Future  - CBC WITH DIFFERENTIAL; Future  - PROSTATE SPECIFIC AG SCREENING; Future    3. Dyslipidemia  All at target per last blood work with his medication atorvastatin.  - Lipid Profile; Future  - Comp Metabolic Panel; Future  - CBC WITH DIFFERENTIAL; Future  - PROSTATE SPECIFIC AG SCREENING; Future    4. Postsurgical hypothyroidism  Last TSH was just done in April 2021 and came back mildly suppressed which is where he needs to be due to his history of papillary carcinoma of the thyroid.  Continue follow-up with [facility] endocrinology group.    5. Insomnia, unspecified type  Stable on zolpidem.    6. Screening for prostate cancer  We will do screening PSA with the next blood work.  - Lipid Profile; Future  - Comp Metabolic Panel; Future  - CBC WITH DIFFERENTIAL; Future  - PROSTATE SPECIFIC AG SCREENING; Future    He will follow-up in 6 months for regular follow-up of his medical problems but I will see him sooner if needed for knee injection.      Please note that this dictation was created using voice recognition software. I have worked with consultants from the vendor as well as technical experts from [facility] to optimize the interface. I have made every reasonable attempt to correct obvious errors, but I expect that there are errors of grammar and possibly content I did not discover before finalizing the note.

## 2021-07-07 NOTE — LETTER
[facility]  Rebekah Calix M.D.  [address]  [place] [address]  Fax: [phone]   Authorization for Release/Disclosure of   Protected Health Information   Name: ALBA OLIVAS JR. : 1965 SSN: [id number]   [facility] [place] [address] Phone:    [phone] (home)    I authorize the entity listed below to release/disclose the PHI below to:   [facility]/Rebekah Calix M.D. and Rebekah Calix M.D.   Provider or Entity Name:    [facility]   Address   City, State, [place], [place] Phone:      Fax:     Reason for request: continuity of care   Information to be released:    [  ] LAST COLONOSCOPY,  including any PATH REPORT and follow-up  [  ] LAST FIT/COLOGUARD RESULT [  ] LAST DEXA  [  ] LAST MAMMOGRAM  [  ] LAST PAP  [  ] LAST LABS [  ] RETINA EXAM REPORT  [  ] IMMUNIZATION RECORDS  [x  ] Release all info      [  ] Check here and initial the line next to each item to release ALL health information INCLUDING  _____ Care and treatment for drug and / or alcohol abuse  _____ HIV testing, infection status, or AIDS  _____ Genetic Testing    DATES OF SERVICE OR TIME PERIOD TO BE DISCLOSED: _____________  I understand and acknowledge that:  * This Authorization may be revoked at any time by you in writing, except if your health information has already been used or disclosed.  * Your health information that will be used or disclosed as a result of you signing this authorization could be re-disclosed by the recipient. If this occurs, your re-disclosed health information may no longer be protected by State or Federal laws.  * You may refuse to sign this Authorization. Your refusal will not affect your ability to obtain treatment.  * This Authorization becomes effective upon signing and will  on (date) __________.      If no date is indicated, this Authorization will  one (1) year from the signature date.    Name: Alba Olivas Jr.    Signature:    Date:     7/7/2021       PLEASE FAX REQUESTED RECORDS BACK TO: [phone]

## 2021-07-20 ENCOUNTER — APPOINTMENT (OUTPATIENT)
Dept: ENDOCRINOLOGY | Facility: MEDICAL CENTER | Age: 56
End: 2021-07-20
Attending: INTERNAL MEDICINE
Payer: COMMERCIAL

## 2021-07-20 ENCOUNTER — OFFICE VISIT (OUTPATIENT)
Dept: MEDICAL GROUP | Facility: PHYSICIAN GROUP | Age: 56
End: 2021-07-20
Payer: COMMERCIAL

## 2021-07-20 VITALS
OXYGEN SATURATION: 97 % | DIASTOLIC BLOOD PRESSURE: 90 MMHG | BODY MASS INDEX: 28.73 KG/M2 | HEART RATE: 87 BPM | HEIGHT: 66 IN | TEMPERATURE: 98.4 F | SYSTOLIC BLOOD PRESSURE: 120 MMHG | WEIGHT: 178.79 LBS

## 2021-07-20 DIAGNOSIS — M25.562 CHRONIC PAIN OF LEFT KNEE: ICD-10-CM

## 2021-07-20 DIAGNOSIS — G89.29 CHRONIC PAIN OF LEFT KNEE: ICD-10-CM

## 2021-07-20 PROCEDURE — 20610 DRAIN/INJ JOINT/BURSA W/O US: CPT | Performed by: FAMILY MEDICINE

## 2021-07-20 RX ORDER — TRIAMCINOLONE ACETONIDE 40 MG/ML
80 INJECTION, SUSPENSION INTRA-ARTICULAR; INTRAMUSCULAR ONCE
Status: COMPLETED | OUTPATIENT
Start: 2021-07-20 | End: 2021-07-20

## 2021-07-20 RX ADMIN — TRIAMCINOLONE ACETONIDE 80 MG: 40 INJECTION, SUSPENSION INTRA-ARTICULAR; INTRAMUSCULAR at 18:14

## 2021-07-20 ASSESSMENT — FIBROSIS 4 INDEX: FIB4 SCORE: 2.19

## 2021-07-20 NOTE — LETTER
July 20, 2021         Patient: Don Olivas Jr.   YOB: 1965   Date of Visit: 7/20/2021           To Whom it May Concern:    Don Olivas was seen in my clinic on 7/20/2021. He {Return to school/sport/work:18153}    If you have any questions or concerns, please don't hesitate to call.        Sincerely,           Rebekah Calix M.D.  Electronically Signed

## 2021-07-20 NOTE — LETTER
July 20, 2021         Patient: Don Olivas Jr.   YOB: 1965   Date of Visit: 7/20/2021           To Whom it May Concern:    Don Olivas was seen in my clinic on 7/20/2021. He is advised to sit and not to stand while working for 11 1/2 hours on 7/22-24/2021 due to left knee problem.     If you have any questions or concerns, please don't hesitate to call.        Sincerely,           Rebekah Calix M.D.  Electronically Signed

## 2021-07-20 NOTE — LETTER
July 20, 2021                            Patient: Don Olivas Jr.   YOB: 1965   Date of Visit: 7/20/2021           To Whom It May Concern:    Don Olivas has life-threatening {reasons:19488} allergies. I have prescribed {type:96195}, which is absolutely necessary for acute reactions.    It is my medical opinion that a patient with this problem should never be without this device. If you have any questions or concerns, please contact us immediately.        Sincerely,          Rebekah Calix M.D.    Electronically Signed

## 2021-07-21 NOTE — PROGRESS NOTES
"Subjective:      Don Olivas Jr. is a 55 y.o. male who presents with Injections (knee left)            HPI     She is here to have left knee steroid injection.  The pain started 1-1/2 months ago when he was doing a lot of walking when he was in [place].  He was seen and evaluated at [facility] urgent care in [place], [place] a month ago and x-ray was done that showed mild osteoarthritis of the left knee.  About 3 weeks ago, while he was trying to put on his underwear while he was in the standing position he heard a loud crack in the left knee that made him fell on the floor and he fell on the floor because of the pain.  Since then he has been painful on and off with swelling on and off causing difficulty with ambulation.  Even minimal twisting of the knee causes significant pain.  He has been taking 1000 mg of Tylenol as needed which is helping.  He also takes Aleve as needed.    Past medical history, past surgical history, family history reviewed-no changes    Social history reviewed-no changes    Allergies reviewed-no changes    Medications reviewed-no changes    ROS     As per HPI, the rest are negative.       Objective:     /90 (BP Location: Left arm, Patient Position: Sitting, BP Cuff Size: Adult)   Pulse 87   Temp 36.9 °C (98.4 °F) (Temporal)   Ht 1.676 m (5' 6\")   Wt 81.1 kg (178 lb 12.7 oz)   SpO2 97%   BMI 28.86 kg/m²      Physical Exam     Examined alert, awake, oriented, not in distress    Neck-supple, no lymphadenopathy, no thyromegaly  Lungs-clear to auscultation, no rales, no wheezes  Heart-regular rate and rhythm, no murmur  Extremities-no edema, clubbing, cyanosis  Left knee-minimal effusion, no pinpoint tenderness, full range of movement on passive flexion and extension, no laxity, no instability                   Assessment/Plan:        1. Chronic pain of left knee  Landmarks were identified.  I used a superior lateral approach.  Area cleansed with Betadine sticks x3.  I " injected 3 cc of 2% lidocaine, 2 cc of 0.5% Marcaine, use of 40 mg/mL of Kenalog.  Patient tolerated procedure well.  Post injection instructions given.  He was given a work note recommending not to stand while he is working for 11-1/2 hours will sit only from 7/22-24/2021.  Patient is off work tomorrow.  He can continue taking Tylenol and Aleve as needed.  - triamcinolone acetonide (KENALOG-40) injection 80 mg      Please note that this dictation was created using voice recognition software. I have worked with consultants from the vendor as well as technical experts from [facility] to optimize the interface. I have made every reasonable attempt to correct obvious errors, but I expect that there are errors of grammar and possibly content I did not discover before finalizing the note.

## 2021-08-03 ENCOUNTER — OFFICE VISIT (OUTPATIENT)
Dept: MEDICAL GROUP | Facility: PHYSICIAN GROUP | Age: 56
End: 2021-08-03
Payer: COMMERCIAL

## 2021-08-03 VITALS
TEMPERATURE: 98.9 F | OXYGEN SATURATION: 96 % | HEART RATE: 84 BPM | WEIGHT: 179.68 LBS | BODY MASS INDEX: 28.88 KG/M2 | DIASTOLIC BLOOD PRESSURE: 60 MMHG | SYSTOLIC BLOOD PRESSURE: 100 MMHG | HEIGHT: 66 IN

## 2021-08-03 DIAGNOSIS — G89.29 CHRONIC PAIN OF LEFT KNEE: ICD-10-CM

## 2021-08-03 DIAGNOSIS — G47.00 INSOMNIA, UNSPECIFIED TYPE: ICD-10-CM

## 2021-08-03 DIAGNOSIS — M25.562 CHRONIC PAIN OF LEFT KNEE: ICD-10-CM

## 2021-08-03 PROCEDURE — 99213 OFFICE O/P EST LOW 20 MIN: CPT | Performed by: FAMILY MEDICINE

## 2021-08-03 RX ORDER — DICLOFENAC SODIUM 75 MG/1
75 TABLET, DELAYED RELEASE ORAL 2 TIMES DAILY WITH MEALS
Qty: 60 TABLET | Refills: 0 | Status: SHIPPED | OUTPATIENT
Start: 2021-08-03 | End: 2021-09-02 | Stop reason: SDUPTHER

## 2021-08-03 ASSESSMENT — FIBROSIS 4 INDEX: FIB4 SCORE: 2.19

## 2021-08-03 NOTE — LETTER
August 3, 2021         Patient: Don Olivas Jr.   YOB: 1965   Date of Visit: 8/3/2021           To Whom it May Concern:    Don Olivas was seen in my clinic on 8/3/2021. He is advised to sit and not to stand while working for 11 1/2 hours starting 8/5/2021 until further notice due to left knee problem.       If you have any questions or concerns, please don't hesitate to call.        Sincerely,           Rebekah Calix M.D.  Electronically Signed

## 2021-08-04 RX ORDER — ZOLPIDEM TARTRATE 10 MG/1
10 TABLET ORAL NIGHTLY PRN
Qty: 30 TABLET | Refills: 0 | Status: SHIPPED | OUTPATIENT
Start: 2021-08-04 | End: 2021-09-03

## 2021-08-04 NOTE — TELEPHONE ENCOUNTER
Received request via: Pharmacy    Was the patient seen in the last year in this department? YES    Does the patient have an active prescription (recently filled or refills available) for medication(s) requested? No

## 2021-08-04 NOTE — PROGRESS NOTES
"Subjective:      Don Olivas Jr. is a 55 y.o. male who presents with Pain (knee)            HPI     Patient is here because of left knee pain.    He started having left knee pain about 2 months ago when he went to [place] and was doing a lot of walking while he was there.  He went to a local urgent care which was [facility] in [place], [place] 2 months ago and x-ray of the knee was done which showed mild osteoarthritis without any fracture or dislocation.  I gave him a steroid injection in the left knee 2 weeks ago.  The pain resolved.  At that time time he said the pain was in the lateral joint line.  He started having pain in the medial joint line of the same knee 5 days ago.  Denies any trauma.  He has been taking Tylenol and over-the-counter NSAIDs without help.  The meloxicam that I previously gave him is not helping.  He has been using knee sleeve.    Past medical history, past surgical history, family history reviewed-no changes    Social history reviewed-no changes    Allergies reviewed-no changes    Medications reviewed-no changes    ROS     As per HPI.       Objective:     /60 (BP Location: Left arm, Patient Position: Sitting, BP Cuff Size: Adult)   Pulse 84   Temp 37.2 °C (98.9 °F) (Temporal)   Ht 1.676 m (5' 6\")   Wt 81.5 kg (179 lb 10.8 oz)   SpO2 96%   BMI 29.00 kg/m²      Physical Exam     Examined alert, awake, oriented, not in distress    Left knee exam-no erythema of the skin overlying the knee, no warmth, no effusion, positive marked tenderness medial joint line, he can do full flexion and extension but slow because of pain, no laxity, no instability                       Assessment/Plan:        1. Chronic pain of left knee  I told him that we will not be able to give him another steroid shot since we just gave it 2 weeks ago.  His pain is in the medial joint line with marked tenderness.  He said his pain previously was in the lateral joint line which has resolved with a " knee injection.  His x-ray done at local urgent care came back mild osteoarthritis.  I will proceed with MRI of the knee for further evaluation to rule out any meniscus or ligament tear.  Diclofenac 75 mg 1 tablet twice a day with food was prescribed.  Referral placed to orthopedist for further evaluation and treatment.  He can do warm compresses for 15 minutes 3 times a day.  - MR-KNEE-W/O LEFT; Future  - diclofenac DR (VOLTAREN) 75 MG Tablet Delayed Response; Take 1 tablet by mouth 2 times a day with meals.  Dispense: 60 tablet; Refill: 0    Follow-up as needed.      Please note that this dictation was created using voice recognition software. I have worked with consultants from the vendor as well as technical experts from [employer] to optimize the interface. I have made every reasonable attempt to correct obvious errors, but I expect that there are errors of grammar and possibly content I did not discover before finalizing the note.    - REFERRAL TO ORTHOPEDICS

## 2021-09-02 DIAGNOSIS — G89.29 CHRONIC PAIN OF LEFT KNEE: ICD-10-CM

## 2021-09-02 DIAGNOSIS — M25.562 CHRONIC PAIN OF LEFT KNEE: ICD-10-CM

## 2021-09-02 RX ORDER — DICLOFENAC SODIUM 75 MG/1
75 TABLET, DELAYED RELEASE ORAL 2 TIMES DAILY WITH MEALS
Qty: 60 TABLET | Refills: 0 | Status: SHIPPED | OUTPATIENT
Start: 2021-09-02 | End: 2021-10-08

## 2021-09-14 DIAGNOSIS — E89.0 POSTSURGICAL HYPOTHYROIDISM: ICD-10-CM

## 2021-09-14 RX ORDER — LEVOTHYROXINE SODIUM 0.1 MG/1
100 TABLET ORAL
Qty: 90 TABLET | Refills: 0 | Status: SHIPPED | OUTPATIENT
Start: 2021-09-14 | End: 2021-10-11

## 2021-09-28 DIAGNOSIS — C73 PAPILLARY CARCINOMA OF THYROID (HCC): ICD-10-CM

## 2021-09-28 DIAGNOSIS — E89.0 POSTSURGICAL HYPOTHYROIDISM: ICD-10-CM

## 2021-10-07 DIAGNOSIS — G47.00 INSOMNIA, UNSPECIFIED TYPE: ICD-10-CM

## 2021-10-07 DIAGNOSIS — I10 ESSENTIAL HYPERTENSION: ICD-10-CM

## 2021-10-07 DIAGNOSIS — M25.562 CHRONIC PAIN OF LEFT KNEE: ICD-10-CM

## 2021-10-07 DIAGNOSIS — G89.29 CHRONIC PAIN OF LEFT KNEE: ICD-10-CM

## 2021-10-08 RX ORDER — ZOLPIDEM TARTRATE 10 MG/1
TABLET ORAL
Qty: 30 TABLET | Refills: 0 | Status: SHIPPED | OUTPATIENT
Start: 2021-10-08 | End: 2021-11-12

## 2021-10-08 RX ORDER — LOSARTAN POTASSIUM 100 MG/1
TABLET ORAL
Qty: 90 TABLET | Refills: 1 | Status: SHIPPED | OUTPATIENT
Start: 2021-10-08 | End: 2022-05-13

## 2021-10-08 RX ORDER — DICLOFENAC SODIUM 75 MG/1
TABLET, DELAYED RELEASE ORAL
Qty: 60 TABLET | Refills: 1 | Status: SHIPPED | OUTPATIENT
Start: 2021-10-08 | End: 2022-01-09 | Stop reason: SDUPTHER

## 2021-10-11 ENCOUNTER — HOSPITAL ENCOUNTER (OUTPATIENT)
Dept: LAB | Facility: MEDICAL CENTER | Age: 56
End: 2021-10-11
Attending: INTERNAL MEDICINE
Payer: COMMERCIAL

## 2021-10-11 ENCOUNTER — OFFICE VISIT (OUTPATIENT)
Dept: ENDOCRINOLOGY | Facility: MEDICAL CENTER | Age: 56
End: 2021-10-11
Attending: INTERNAL MEDICINE
Payer: COMMERCIAL

## 2021-10-11 VITALS
WEIGHT: 188.5 LBS | HEART RATE: 82 BPM | OXYGEN SATURATION: 99 % | HEIGHT: 66 IN | DIASTOLIC BLOOD PRESSURE: 80 MMHG | SYSTOLIC BLOOD PRESSURE: 128 MMHG | BODY MASS INDEX: 30.29 KG/M2

## 2021-10-11 DIAGNOSIS — C73 PAPILLARY CARCINOMA OF THYROID (HCC): ICD-10-CM

## 2021-10-11 DIAGNOSIS — K11.8 MASS OF LEFT PAROTID GLAND: ICD-10-CM

## 2021-10-11 DIAGNOSIS — E89.0 POSTOPERATIVE HYPOTHYROIDISM: ICD-10-CM

## 2021-10-11 DIAGNOSIS — E55.9 VITAMIN D DEFICIENCY: ICD-10-CM

## 2021-10-11 LAB
ALBUMIN SERPL BCP-MCNC: 4.7 G/DL (ref 3.2–4.9)
ALBUMIN/GLOB SERPL: 1.6 G/DL
ALP SERPL-CCNC: 81 U/L (ref 30–99)
ALT SERPL-CCNC: 53 U/L (ref 2–50)
ANION GAP SERPL CALC-SCNC: 11 MMOL/L (ref 7–16)
AST SERPL-CCNC: 31 U/L (ref 12–45)
BILIRUB SERPL-MCNC: 0.6 MG/DL (ref 0.1–1.5)
BUN SERPL-MCNC: 21 MG/DL (ref 8–22)
CALCIUM SERPL-MCNC: 7.9 MG/DL (ref 8.4–10.2)
CHLORIDE SERPL-SCNC: 104 MMOL/L (ref 96–112)
CO2 SERPL-SCNC: 26 MMOL/L (ref 20–33)
CREAT SERPL-MCNC: 1.57 MG/DL (ref 0.5–1.4)
FASTING STATUS PATIENT QL REPORTED: NORMAL
GLOBULIN SER CALC-MCNC: 2.9 G/DL (ref 1.9–3.5)
GLUCOSE SERPL-MCNC: 92 MG/DL (ref 65–99)
POTASSIUM SERPL-SCNC: 4.3 MMOL/L (ref 3.6–5.5)
PROT SERPL-MCNC: 7.6 G/DL (ref 6–8.2)
SODIUM SERPL-SCNC: 141 MMOL/L (ref 135–145)
T4 FREE SERPL-MCNC: 1.71 NG/DL (ref 0.93–1.7)
TSH SERPL DL<=0.005 MIU/L-ACNC: 1.09 UIU/ML (ref 0.38–5.33)

## 2021-10-11 PROCEDURE — 99214 OFFICE O/P EST MOD 30 MIN: CPT | Performed by: INTERNAL MEDICINE

## 2021-10-11 PROCEDURE — 84432 ASSAY OF THYROGLOBULIN: CPT

## 2021-10-11 PROCEDURE — 80053 COMPREHEN METABOLIC PANEL: CPT

## 2021-10-11 PROCEDURE — 84443 ASSAY THYROID STIM HORMONE: CPT

## 2021-10-11 PROCEDURE — 99211 OFF/OP EST MAY X REQ PHY/QHP: CPT | Performed by: INTERNAL MEDICINE

## 2021-10-11 PROCEDURE — 86800 THYROGLOBULIN ANTIBODY: CPT

## 2021-10-11 PROCEDURE — 84439 ASSAY OF FREE THYROXINE: CPT

## 2021-10-11 PROCEDURE — 36415 COLL VENOUS BLD VENIPUNCTURE: CPT

## 2021-10-11 RX ORDER — LEVOTHYROXINE SODIUM 88 UG/1
88 TABLET ORAL
Qty: 90 TABLET | Refills: 2 | Status: SHIPPED | OUTPATIENT
Start: 2021-10-11 | End: 2022-01-09 | Stop reason: SDUPTHER

## 2021-10-11 ASSESSMENT — FIBROSIS 4 INDEX: FIB4 SCORE: 2.19

## 2021-10-11 NOTE — PROGRESS NOTES
CHIEF COMPLAINT: Followup of postsurgical hypothyroidism and papillary thyroid carcinoma.    HPI:   Don Olivas Jr. is a 55 y.o. male, who had initial total thyroidectomy on 6/2007 by Dr. Lindsey with pathology revealing papillary thyroid carcinoma 2.0 cm  Right lobe and 0.3 cm left lobe with no ETE.  He did not have problems with postoperative hypocalcemia.   He was not treated with radioactive iodine post surgery initially.    He was seen by Dr. Lovell and Dr. Kang  and most recently he was seen by Dr. Hyatt.    US on 2015 - He was found to a suspicious mass on the right thyroid bed ntermediate echogenicity material in the right thyroid bed measuring 12 x 22 x 13 mm. In the center of this there is an indistinct hypoechoic region measuring 8 x 8 mm. There is internal vascular flow. No calcification.  In the left thyroid bed there is what also appears to represent some thyroid gland measuring 12 x 7 x 6 mm    Biopsy was not done for these masses    It was presumed by his previous endocrinologists that these masses represented residual thyroid tissue and he was referred to Dr. Lindsey for completion thyroidectomy.  However Dr. Lindsey required an FNA for the suspicious masses which never got completed for unclear reasons    The patient then received 50 mCi-131 on 9/2019 under the orders of Dr. Hyatt.  Post treatment scan showed:  3 discrete foci of increased uptake in the lower neck with associated star artifact. This presumably represents residual or recurrent thyroid tissue or disease. Cannot exclude any christiano disease.  Normal activity is seen in the liver, spleen     Follow-up neck US on Jan 2021 showed:   The right lobe of the thyroid gland measures 0.70 cm x 1.22 cm x 0.79 cm. The contour and echogenicity are normal. No focal mass lesions are identified.   The left lobe of the thyroid gland measures 0.62 cm x 0.75 cm x 0.46 cm. The contour and echogenicity are normal. No focal mass  lesions are identified.       His unstimulated thyroglobulin was 2.2 with negative antibodies in Aug 2019  Unstimulated thyroglobulin was 0.8 with negative antibodies in January 2020  Unstimulated thyroglobulin was 0.5 with negative antibodies in July 2020  Unstimulated thyroglobulin was 0.5 with negative antibodies on January 2021        Since last visit, he has remained on Levothyroxine 100 micrograms daily, which has been his dose since 12 months.  He is feeling well.  Energy level has been excellent.  Weight is Stable.  No palpitations, no frequent diarrhea, or frequent constipation.  No heat or cold intolerance.  No anterior neck symptoms or problems.  No voice changes.    TSH was over suppressed at 0.070 with a free T4 of 1.73 on April 6, 2021  Vitamin D was 51 on April 6, 2021    We do not have updated thyroid labs      Aside from this the patient has mass on the left parotid gland seen on ultrasound.  It was first discovered in 2007.  It originally measured 15 x 13 x 12 mm and on ultrasound in 2015 it measured 22 x 20 x 11 mm    There has been no follow-up imaging for this particular mass        Patient's medications, allergies, and social histories were reviewed and updated as appropriate.      ROS:      CONS:     No fever, no chills   EYES:     No diplopia, no blurry vision   CV:           No chest pain, no palpitations   PULM:     No SOB, no cough, no hemoptysis.   GI:            No nausea, no vomiting, no diarrhea, no constipation   ENDO:     No polyuria, no polydipsia, no heat intolerance, no cold intolerance       Past Medical History:  Problem List:  2020-09: Right knee pain  2015-10: Vitamin D deficiency  2015-07: Essential hypertension  2015-06: Parotid gland enlargement  2015-04: HTN (hypertension)  2013-01: ASTHMA  2012-10: Postsurgical hypothyroidism  Hypothyroidism  Insomnia  Papillary carcinoma of thyroid (HCC)  HTN (hypertension)  Sedative, hypnotic or anxiolytic dependence (HCC)      Past  "Surgical History:  Past Surgical History:   Procedure Laterality Date   • COLONOSCOPY  05/08/2018    mild divertoculosis sigmoid colon, int hemorrhoids   • THYROIDECTOMY TOTAL  2007    due to thyroid cancer        Allergies:  Lisinopril     Social History:  Social History     Tobacco Use   • Smoking status: Current Every Day Smoker     Packs/day: 0.30     Years: 34.00     Pack years: 10.20     Types: Cigarettes   • Smokeless tobacco: Never Used   Vaping Use   • Vaping Use: Never used   Substance Use Topics   • Alcohol use: No     Alcohol/week: 0.0 oz     Comment: none in 12 yrs   • Drug use: No        Family History:   family history includes Thyroid in his sister and sister.      PHYSICAL EXAM:   Vital signs: /80 (BP Location: Left arm, Patient Position: Sitting, BP Cuff Size: Adult)   Pulse 82   Ht 1.676 m (5' 6\")   Wt 85.5 kg (188 lb 8 oz)   SpO2 99%   BMI 30.42 kg/m²   GENERAL: Well-developed, well-nourished in no apparent distress.   EYE:  No ocular asymmetry, PERRLA  HENT: Pink, moist mucous membranes.    NECK: Well healed transverse scar, Thyroid is surgically absent   There is a palpable firm movable mass measuring more than 2 cm located inferior to the left parotid gland just below the angle of the jaw.  CARDIOVASCULAR:  No murmurs  LUNGS: Clear breath sounds  ABDOMEN: Soft, nontender   EXTREMITIES: No clubbing, cyanosis, or edema.   NEUROLOGICAL: No gross focal motor abnormalities   LYMPH: No cervical adenopathy palpated.   SKIN: No rashes, lesions.       Labs:  Lab Results   Component Value Date/Time    WBC 6.4 09/14/2020 07:06 AM    RBC 5.11 09/14/2020 07:06 AM    HEMOGLOBIN 16.4 09/14/2020 07:06 AM    MCV 93.0 09/14/2020 07:06 AM    MCH 32.1 09/14/2020 07:06 AM    MCHC 34.5 09/14/2020 07:06 AM    RDW 42.7 09/14/2020 07:06 AM    MPV 10.7 09/14/2020 07:06 AM       Lab Results   Component Value Date/Time    SODIUM 138 04/06/2021 08:40 AM    POTASSIUM 4.3 04/06/2021 08:40 AM    CHLORIDE 103 " 04/06/2021 08:40 AM    CO2 28 04/06/2021 08:40 AM    ANION 7.0 04/06/2021 08:40 AM    GLUCOSE 103 (H) 04/06/2021 08:40 AM    BUN 21 04/06/2021 08:40 AM    CREATININE 1.21 04/06/2021 08:40 AM    CREATININE 1.0 06/30/2007 08:25 AM    CALCIUM 8.4 (L) 04/06/2021 08:40 AM    ASTSGOT 35 04/06/2021 08:40 AM    ALTSGPT 29 04/06/2021 08:40 AM    TBILIRUBIN 0.8 04/06/2021 08:40 AM    ALBUMIN 4.4 04/06/2021 08:40 AM    TOTPROTEIN 7.4 04/06/2021 08:40 AM    GLOBULIN 3.0 04/06/2021 08:40 AM    AGRATIO 1.5 04/06/2021 08:40 AM       Lab Results   Component Value Date/Time    TSHULTRASEN 0.070 (L) 04/06/2021 0840     Lab Results   Component Value Date/Time    FREET4 1.73 (H) 04/06/2021 0840     Lab Results   Component Value Date/Time    FREET3 3.37 01/05/2021 0836     No results found for: THYSTIMIG      Imaging:      ASSESSMENT/PLAN:     1. Papillary carcinoma of thyroid (HCC)  He has a structurally indeterminate response to therapy with residual masses seen in the thyroid bed which were unfortunately not biopsied prior to treatment with radioactive iodine  However his quantitative thyroglobulin levels are not elevated which reduces the possibility that this is cancer recurrence and these are most likely residual thyroid tissue  Unfortunately I do not believe that one treatment with radioactive iodine is going to be enough to obliterate the residual thyroid tissue  We will continue observation of the structural indeterminate masses I am getting a neck CT scan because of his parotid mass  I will see him again in 6 months  We will continue to monitor his quantitative thyroglobulin every 6 months and get a neck ultrasound every 12 months      2. Postoperative hypothyroidism  Unstable  He has a structurally indeterminate response to therapy  His TSH is over suppressed and is less than 0.1  I am adjusting his levothyroxine 88 MCG daily  We will repeat his TSH levels again in 6 months    3. Vitamin D deficiency  Controlled continue  current supplements  Repeat calcium vitamin D in 6 months    4. Mass of left parotid gland  Unstable  And large persistent left parotid mass seen  I am scheduling for a neck CT  I am referring him to Dr. Den Arias, ENT for evaluation and management      Return in about 6 months (around 4/11/2022).      This patient during there office visit today was started on a new medication.  Side effects of the new medication were discussed with the patient today in the office.     Thank you kindly for allowing me to participate in the thyroid care plan for this patient.    Erlin Crane MD, FACE, [facility]  10/11/21    CC:   Rebekah Calix M.D.

## 2021-10-12 ENCOUNTER — TELEPHONE (OUTPATIENT)
Dept: ENDOCRINOLOGY | Facility: MEDICAL CENTER | Age: 56
End: 2021-10-12

## 2021-10-12 NOTE — TELEPHONE ENCOUNTER
LVM to pt [phone] today 10/12/2021 , per advised of Dr Crane , lab results are better and take the levothyroxine 88 mcg as prescribed. Advised to have the labwork done before his next appt .

## 2021-10-13 LAB
THYROGLOB AB SERPL-ACNC: <0.9 IU/ML (ref 0–4)
THYROGLOB SERPL-MCNC: 0.7 NG/ML (ref 1.3–31.8)
THYROGLOB SERPL-MCNC: ABNORMAL NG/ML (ref 1.3–31.8)

## 2021-11-12 DIAGNOSIS — G47.00 INSOMNIA, UNSPECIFIED TYPE: ICD-10-CM

## 2021-11-12 RX ORDER — ZOLPIDEM TARTRATE 10 MG/1
TABLET ORAL
Qty: 30 TABLET | Refills: 0 | Status: SHIPPED | OUTPATIENT
Start: 2021-11-12 | End: 2021-12-16

## 2022-01-09 DIAGNOSIS — G89.29 CHRONIC PAIN OF LEFT KNEE: ICD-10-CM

## 2022-01-09 DIAGNOSIS — M25.562 CHRONIC PAIN OF LEFT KNEE: ICD-10-CM

## 2022-01-09 DIAGNOSIS — E89.0 POSTOPERATIVE HYPOTHYROIDISM: ICD-10-CM

## 2022-01-09 RX ORDER — ATORVASTATIN CALCIUM 20 MG/1
20 TABLET, FILM COATED ORAL
Qty: 90 TABLET | Refills: 1 | Status: SHIPPED | OUTPATIENT
Start: 2022-01-09 | End: 2022-01-12 | Stop reason: SDUPTHER

## 2022-01-09 RX ORDER — DICLOFENAC SODIUM 75 MG/1
75 TABLET, DELAYED RELEASE ORAL 2 TIMES DAILY WITH MEALS
Qty: 60 TABLET | Refills: 1 | Status: SHIPPED | OUTPATIENT
Start: 2022-01-09 | End: 2022-01-11 | Stop reason: SDUPTHER

## 2022-01-10 RX ORDER — LEVOTHYROXINE SODIUM 88 UG/1
88 TABLET ORAL
Qty: 90 TABLET | Refills: 2 | Status: SHIPPED | OUTPATIENT
Start: 2022-01-10 | End: 2022-06-28 | Stop reason: SDUPTHER

## 2022-01-11 DIAGNOSIS — G89.29 CHRONIC PAIN OF LEFT KNEE: ICD-10-CM

## 2022-01-11 DIAGNOSIS — M25.562 CHRONIC PAIN OF LEFT KNEE: ICD-10-CM

## 2022-01-11 RX ORDER — DICLOFENAC SODIUM 75 MG/1
75 TABLET, DELAYED RELEASE ORAL 2 TIMES DAILY WITH MEALS
Qty: 60 TABLET | Refills: 0 | Status: SHIPPED | OUTPATIENT
Start: 2022-01-11 | End: 2022-01-12 | Stop reason: SDUPTHER

## 2022-01-12 ENCOUNTER — TELEPHONE (OUTPATIENT)
Dept: ENDOCRINOLOGY | Facility: MEDICAL CENTER | Age: 57
End: 2022-01-12

## 2022-01-12 DIAGNOSIS — M25.562 CHRONIC PAIN OF LEFT KNEE: ICD-10-CM

## 2022-01-12 DIAGNOSIS — G89.29 CHRONIC PAIN OF LEFT KNEE: ICD-10-CM

## 2022-01-12 RX ORDER — ATORVASTATIN CALCIUM 20 MG/1
20 TABLET, FILM COATED ORAL
Qty: 90 TABLET | Refills: 1 | Status: SHIPPED | OUTPATIENT
Start: 2022-01-12 | End: 2022-07-12 | Stop reason: SDUPTHER

## 2022-01-12 RX ORDER — DICLOFENAC SODIUM 75 MG/1
75 TABLET, DELAYED RELEASE ORAL 2 TIMES DAILY WITH MEALS
Qty: 60 TABLET | Refills: 0 | Status: SHIPPED | OUTPATIENT
Start: 2022-01-12 | End: 2022-03-10

## 2022-01-12 NOTE — TELEPHONE ENCOUNTER
PT called because [facility] said they hadn't received his levothyroxine yet. I confirmed that we had sent it over on 01/10. I spoke with Isabelle and they were going to call it in again

## 2022-01-12 NOTE — TELEPHONE ENCOUNTER
Received request via: Pharmacy    Was the patient seen in the last year in this department? Yes    Does the patient have an active prescription (recently filled or refills available) for medication(s) requested? No           [facility]

## 2022-01-20 DIAGNOSIS — G47.00 INSOMNIA, UNSPECIFIED TYPE: ICD-10-CM

## 2022-01-20 RX ORDER — ZOLPIDEM TARTRATE 10 MG/1
10 TABLET ORAL
Qty: 30 TABLET | Refills: 0 | Status: SHIPPED | OUTPATIENT
Start: 2022-01-20 | End: 2022-03-11

## 2022-01-26 ENCOUNTER — TELEPHONE (OUTPATIENT)
Dept: MEDICAL GROUP | Facility: PHYSICIAN GROUP | Age: 57
End: 2022-01-26

## 2022-01-26 NOTE — TELEPHONE ENCOUNTER
DOCUMENTATION OF PAR STATUS:    1. Name of Medication & Dose: Ambien     2. Name of Prescription Coverage Company & phone #:[email or web address]    3. Date Prior Auth Submitted: 01/26/2022    4. What information was given to obtain insurance decision? RX    5. Prior Auth Status? Pending    6. Patient Notified: no

## 2022-02-07 ENCOUNTER — OFFICE VISIT (OUTPATIENT)
Dept: MEDICAL GROUP | Facility: PHYSICIAN GROUP | Age: 57
End: 2022-02-07
Payer: COMMERCIAL

## 2022-02-07 ENCOUNTER — TELEPHONE (OUTPATIENT)
Dept: ENDOCRINOLOGY | Facility: MEDICAL CENTER | Age: 57
End: 2022-02-07

## 2022-02-07 VITALS
BODY MASS INDEX: 30.12 KG/M2 | DIASTOLIC BLOOD PRESSURE: 100 MMHG | HEIGHT: 66 IN | WEIGHT: 187.39 LBS | HEART RATE: 78 BPM | TEMPERATURE: 97.8 F | SYSTOLIC BLOOD PRESSURE: 160 MMHG | OXYGEN SATURATION: 95 %

## 2022-02-07 DIAGNOSIS — M25.562 CHRONIC PAIN OF LEFT KNEE: ICD-10-CM

## 2022-02-07 DIAGNOSIS — E78.5 DYSLIPIDEMIA: ICD-10-CM

## 2022-02-07 DIAGNOSIS — I10 ESSENTIAL HYPERTENSION: ICD-10-CM

## 2022-02-07 DIAGNOSIS — E89.0 POSTSURGICAL HYPOTHYROIDISM: ICD-10-CM

## 2022-02-07 DIAGNOSIS — G89.29 CHRONIC PAIN OF LEFT KNEE: ICD-10-CM

## 2022-02-07 PROCEDURE — 99214 OFFICE O/P EST MOD 30 MIN: CPT | Performed by: FAMILY MEDICINE

## 2022-02-07 ASSESSMENT — PATIENT HEALTH QUESTIONNAIRE - PHQ9: CLINICAL INTERPRETATION OF PHQ2 SCORE: 0

## 2022-02-07 ASSESSMENT — FIBROSIS 4 INDEX: FIB4 SCORE: 1.46

## 2022-02-07 NOTE — TELEPHONE ENCOUNTER
Pt called and he seen Dr Calix and he wanted you to know the Biopsy was done in 2015, and he is wondering if you still want him to have another biopsy done. Please call him [phone]

## 2022-02-08 NOTE — PROGRESS NOTES
"Frank Olivas Jr. is a 56 y.o. male who presents with Follow-Up            HPI     Returns for follow-up of his medical problems.    He continues to have left knee pain.  This is a chronic problem since about 7 months ago.  Denies any trauma.  X-ray of the knee came back no fracture or dislocation, mild periarticular osteophyte formation consistent with degenerative change consistent with mild osteoarthritis of the left knee.  I have done steroid injection of the left knee about 7 months ago which worked only temporarily for a few weeks.  I started him on diclofenac 75 mg 1 tablet twice a day which she has been taking with good results.  I referred him to the orthopedist and he was seen by Dr. Simental who recommended conservative treatment with physical therapy.  He said he only did the physical therapy for a few weeks.  I have ordered an MRI to further evaluate the knee pain which she has not done.  He is asking for knee stabilizer/neoprene brace.    In terms of his hypertension, his blood pressure is elevated today but previously under control.  He continues to take losartan 100 mg daily.    For the dyslipidemia, he continues to take atorvastatin without myalgias.  The last lipid panel came back all at [facility] in April 2021 but he did not do the blood work that I previously ordered.    He continues to follow-up with his endocrinologist for postsurgical hypothyroidism for thyroid cancer.  The last thyroid thyroid function tests were adequately replaced.        ROS           Objective     /100   Pulse 78   Temp 36.6 °C (97.8 °F) (Temporal)   Ht 1.676 m (5' 6\")   Wt 85 kg (187 lb 6.3 oz)   SpO2 95%   BMI 30.25 kg/m²      Physical Exam        Examined alert, awake, oriented, not in distress    Neck-supple, no lymphadenopathy, no thyromegaly  Lungs-clear to auscultation, no rales, no wheezes  Heart-regular rate and rhythm, no murmur  Extremities-no edema, clubbing, cyanosis, left knee " exam-no effusion, no pinpoint tenderness, no laxity or instability, no crepitation             Assessment & Plan        1. Chronic pain of left knee  He is asking for knee stabilizer/neoprene brace and I gave him prescription so he can get it from medical supply store.  Advised to take the diclofenac 75 mg 1 tablet twice a day as needed only as his last CMP in October 2021 came back the GFR was low at 46, the creatinine increased to 1.57 from 1.21 with a BUN of 21.  I will get updated blood work as soon as possible.  If kidney function is still diminished we will discontinue NSAID.    2. Essential hypertension   Blood pressure is elevated but previously under control.  He will start monitoring his blood pressure and keep a record and he will message me his blood pressure readings in the next 2 weeks.  Consider adding a second agent if persistently elevated at home.    3. Dyslipidemia  He will do updated blood work as soon as possible.  Continue cholesterol medication.    4. Postsurgical hypothyroidism  Continue thyroid replacement.  He is followed by his endocrinologist.    I will see him for follow-up in 4 months or sooner if needed.      Please note that this dictation was created using voice recognition software. I have worked with consultants from the vendor as well as technical experts from [facility]  [employer] to optimize the interface. I have made every reasonable attempt to correct obvious errors, but I expect that there are errors of grammar and possibly content I did not discover before finalizing the note.

## 2022-02-15 ENCOUNTER — HOSPITAL ENCOUNTER (OUTPATIENT)
Dept: LAB | Facility: MEDICAL CENTER | Age: 57
End: 2022-02-15
Attending: FAMILY MEDICINE
Payer: COMMERCIAL

## 2022-02-15 DIAGNOSIS — E78.5 DYSLIPIDEMIA: ICD-10-CM

## 2022-02-15 DIAGNOSIS — I10 ESSENTIAL HYPERTENSION: ICD-10-CM

## 2022-02-15 DIAGNOSIS — N18.30 STAGE 3 CHRONIC KIDNEY DISEASE, UNSPECIFIED WHETHER STAGE 3A OR 3B CKD: ICD-10-CM

## 2022-02-15 DIAGNOSIS — Z12.5 SCREENING FOR PROSTATE CANCER: ICD-10-CM

## 2022-02-15 DIAGNOSIS — E83.51 HYPOCALCEMIA: ICD-10-CM

## 2022-02-15 LAB
ALBUMIN SERPL BCP-MCNC: 4.6 G/DL (ref 3.2–4.9)
ALBUMIN/GLOB SERPL: 1.8 G/DL
ALP SERPL-CCNC: 66 U/L (ref 30–99)
ALT SERPL-CCNC: 27 U/L (ref 2–50)
ANION GAP SERPL CALC-SCNC: 12 MMOL/L (ref 7–16)
AST SERPL-CCNC: 31 U/L (ref 12–45)
BASOPHILS # BLD AUTO: 0.8 % (ref 0–1.8)
BASOPHILS # BLD: 0.06 K/UL (ref 0–0.12)
BILIRUB SERPL-MCNC: 0.8 MG/DL (ref 0.1–1.5)
BUN SERPL-MCNC: 20 MG/DL (ref 8–22)
CALCIUM SERPL-MCNC: 7.8 MG/DL (ref 8.5–10.5)
CHLORIDE SERPL-SCNC: 104 MMOL/L (ref 96–112)
CHOLEST SERPL-MCNC: 138 MG/DL (ref 100–199)
CO2 SERPL-SCNC: 24 MMOL/L (ref 20–33)
CREAT SERPL-MCNC: 1.44 MG/DL (ref 0.5–1.4)
EOSINOPHIL # BLD AUTO: 0.22 K/UL (ref 0–0.51)
EOSINOPHIL NFR BLD: 3 % (ref 0–6.9)
ERYTHROCYTE [DISTWIDTH] IN BLOOD BY AUTOMATED COUNT: 42.5 FL (ref 35.9–50)
FASTING STATUS PATIENT QL REPORTED: NORMAL
GLOBULIN SER CALC-MCNC: 2.5 G/DL (ref 1.9–3.5)
GLUCOSE SERPL-MCNC: 93 MG/DL (ref 65–99)
HCT VFR BLD AUTO: 45.4 % (ref 42–52)
HDLC SERPL-MCNC: 47 MG/DL
HGB BLD-MCNC: 15.6 G/DL (ref 14–18)
IMM GRANULOCYTES # BLD AUTO: 0.02 K/UL (ref 0–0.11)
IMM GRANULOCYTES NFR BLD AUTO: 0.3 % (ref 0–0.9)
LDLC SERPL CALC-MCNC: 76 MG/DL
LYMPHOCYTES # BLD AUTO: 1.12 K/UL (ref 1–4.8)
LYMPHOCYTES NFR BLD: 15.1 % (ref 22–41)
MCH RBC QN AUTO: 31.8 PG (ref 27–33)
MCHC RBC AUTO-ENTMCNC: 34.4 G/DL (ref 33.7–35.3)
MCV RBC AUTO: 92.5 FL (ref 81.4–97.8)
MONOCYTES # BLD AUTO: 0.6 K/UL (ref 0–0.85)
MONOCYTES NFR BLD AUTO: 8.1 % (ref 0–13.4)
NEUTROPHILS # BLD AUTO: 5.42 K/UL (ref 1.82–7.42)
NEUTROPHILS NFR BLD: 72.7 % (ref 44–72)
NRBC # BLD AUTO: 0 K/UL
NRBC BLD-RTO: 0 /100 WBC
PLATELET # BLD AUTO: 171 K/UL (ref 164–446)
PMV BLD AUTO: 10.8 FL (ref 9–12.9)
POTASSIUM SERPL-SCNC: 4.3 MMOL/L (ref 3.6–5.5)
PROT SERPL-MCNC: 7.1 G/DL (ref 6–8.2)
PSA SERPL-MCNC: 0.92 NG/ML (ref 0–4)
RBC # BLD AUTO: 4.91 M/UL (ref 4.7–6.1)
SODIUM SERPL-SCNC: 140 MMOL/L (ref 135–145)
TRIGL SERPL-MCNC: 77 MG/DL (ref 0–149)
WBC # BLD AUTO: 7.4 K/UL (ref 4.8–10.8)

## 2022-02-15 PROCEDURE — 80053 COMPREHEN METABOLIC PANEL: CPT

## 2022-02-15 PROCEDURE — 80061 LIPID PANEL: CPT

## 2022-02-15 PROCEDURE — 36415 COLL VENOUS BLD VENIPUNCTURE: CPT

## 2022-02-15 PROCEDURE — 85025 COMPLETE CBC W/AUTO DIFF WBC: CPT

## 2022-02-15 PROCEDURE — 84153 ASSAY OF PSA TOTAL: CPT

## 2022-02-16 ENCOUNTER — HOSPITAL ENCOUNTER (OUTPATIENT)
Dept: LAB | Facility: MEDICAL CENTER | Age: 57
End: 2022-02-16
Attending: FAMILY MEDICINE
Payer: COMMERCIAL

## 2022-02-16 DIAGNOSIS — N18.30 STAGE 3 CHRONIC KIDNEY DISEASE, UNSPECIFIED WHETHER STAGE 3A OR 3B CKD: ICD-10-CM

## 2022-02-16 DIAGNOSIS — E83.51 HYPOCALCEMIA: ICD-10-CM

## 2022-02-16 LAB
25(OH)D3 SERPL-MCNC: 53 NG/ML (ref 30–100)
CA-I SERPL-SCNC: 1 MMOL/L (ref 1.1–1.3)
MAGNESIUM SERPL-MCNC: 2.1 MG/DL (ref 1.5–2.5)
PTH-INTACT SERPL-MCNC: 12 PG/ML (ref 14–72)

## 2022-02-16 PROCEDURE — 82330 ASSAY OF CALCIUM: CPT

## 2022-02-16 PROCEDURE — 83735 ASSAY OF MAGNESIUM: CPT

## 2022-02-16 PROCEDURE — 83970 ASSAY OF PARATHORMONE: CPT

## 2022-02-16 PROCEDURE — 36415 COLL VENOUS BLD VENIPUNCTURE: CPT

## 2022-02-16 PROCEDURE — 82306 VITAMIN D 25 HYDROXY: CPT

## 2022-03-10 DIAGNOSIS — G89.29 CHRONIC PAIN OF LEFT KNEE: ICD-10-CM

## 2022-03-10 DIAGNOSIS — G47.00 INSOMNIA, UNSPECIFIED TYPE: ICD-10-CM

## 2022-03-10 DIAGNOSIS — M25.562 CHRONIC PAIN OF LEFT KNEE: ICD-10-CM

## 2022-03-10 RX ORDER — DICLOFENAC SODIUM 75 MG/1
TABLET, DELAYED RELEASE ORAL
Qty: 60 TABLET | Refills: 0 | Status: SHIPPED | OUTPATIENT
Start: 2022-03-10 | End: 2022-04-07

## 2022-03-11 RX ORDER — ZOLPIDEM TARTRATE 10 MG/1
10 TABLET ORAL
Qty: 30 TABLET | Refills: 0 | Status: SHIPPED | OUTPATIENT
Start: 2022-03-11 | End: 2022-04-10

## 2022-03-29 ENCOUNTER — OFFICE VISIT (OUTPATIENT)
Dept: NEPHROLOGY | Facility: MEDICAL CENTER | Age: 57
End: 2022-03-29
Payer: COMMERCIAL

## 2022-03-29 VITALS
HEIGHT: 66 IN | SYSTOLIC BLOOD PRESSURE: 134 MMHG | BODY MASS INDEX: 28.93 KG/M2 | DIASTOLIC BLOOD PRESSURE: 84 MMHG | WEIGHT: 180 LBS | HEART RATE: 84 BPM | OXYGEN SATURATION: 96 % | TEMPERATURE: 98 F

## 2022-03-29 DIAGNOSIS — I10 PRIMARY HYPERTENSION: ICD-10-CM

## 2022-03-29 DIAGNOSIS — E20.9 HYPOPARATHYROIDISM, UNSPECIFIED HYPOPARATHYROIDISM TYPE (HCC): ICD-10-CM

## 2022-03-29 DIAGNOSIS — N17.9 AKI (ACUTE KIDNEY INJURY) (HCC): ICD-10-CM

## 2022-03-29 DIAGNOSIS — E83.51 HYPOCALCEMIA: ICD-10-CM

## 2022-03-29 DIAGNOSIS — E55.9 VITAMIN D DEFICIENCY: ICD-10-CM

## 2022-03-29 PROCEDURE — 99204 OFFICE O/P NEW MOD 45 MIN: CPT | Performed by: INTERNAL MEDICINE

## 2022-03-29 ASSESSMENT — ENCOUNTER SYMPTOMS
VOMITING: 0
HYPERTENSION: 1
COUGH: 0
SHORTNESS OF BREATH: 0
FEVER: 0
NAUSEA: 0
CHILLS: 0

## 2022-03-29 ASSESSMENT — FIBROSIS 4 INDEX: FIB4 SCORE: 1.99

## 2022-03-29 NOTE — PROGRESS NOTES
Chief Complaint   Patient presents with   • Foot     f/up excision lesion right 5th toe, suture removal today       Subjective: Efrain Carranza, a 21 year old male presents to the clinic this date for followup status post excision of skin lesion dorsal medial right fifth toe performed on 10/11/2017. Patient states no pain and otherwise has resumed use of normal shoe gear without issues.    Medications, Allergies, Past medical history, Past surgical history, social history, and family history marked as reviewed in the electronic medical record.    All Systems reviewed negative other than as noted in the history of present illness    Objective:  General: Alert and oriented x3 well-developed 21 year old male  Vitals:   Visit Vitals  Pulse 67   Ht 5' 11\" (1.803 m)   SpO2 97%      Lower Extremity Exam  Sutures intact with incision well healed and no surrounding erythema or drainage dorsal medial right fifth toe. Pulses are readily palpable with no pain on palpation right fifth toe    Assessment:  Z48.02 Visit for suture removal  (primary encounter diagnosis)  L98.9 Skin lesion    Plan:  1. Pathology reviewed with patient showing Pathologic Diagnosis :     Skin, medial right fifth toe, excision:     - Acral fibroma.   2. Sutures removed showing underlying incisional area well healed. Patient may resume all normal activities as tolerated. Recommend patient continue to monitor area for any signs of recurrence  3. Follow-up as needed     Subjective     Don Olivas Jr. is a 56 y.o. male who presents with Chronic Kidney Disease and Hypertension            Patient is a pleasant 56-year-old gentleman with a past medical history significant for longstanding hypertension, also was diagnosed with thyroid cancer, status post thyroidectomy in 2007, then underwent radiation therapy.  Patient has been complaining of hypocalcemia, his intact PTH was low.  Patient has degenerative joint disease and uses NSAIDs frequently.  He was noticed to have elevated creatinine.    Chronic Kidney Disease  This is a new problem. The current episode started more than 1 month ago. The problem occurs constantly. The problem has been waxing and waning. Pertinent negatives include no chest pain, chills, coughing, fever, nausea, urinary symptoms or vomiting.   Hypertension  This is a chronic problem. The current episode started more than 1 year ago. The problem is unchanged. Pertinent negatives include no chest pain, malaise/fatigue, peripheral edema or shortness of breath. Risk factors for coronary artery disease include male gender. Past treatments include angiotensin blockers. The current treatment provides significant improvement. There are no compliance problems.  Hypertensive end-organ damage includes kidney disease. Identifiable causes of hypertension include chronic renal disease.       Review of Systems   Constitutional: Negative for chills, fever and malaise/fatigue.   Respiratory: Negative for cough and shortness of breath.    Cardiovascular: Negative for chest pain and leg swelling.   Gastrointestinal: Negative for nausea and vomiting.   Genitourinary: Negative for dysuria, frequency and urgency.   Musculoskeletal: Positive for joint pain.        Muscle cramps   All other systems reviewed and are negative.             Objective     /84 (BP Location: Right arm, Patient Position: Sitting, BP Cuff Size: Adult)   Pulse 84   Temp 36.7 °C (98 °F) (Temporal)  "  Ht 1.676 m (5' 6\")   Wt 81.6 kg (180 lb)   SpO2 96%   BMI 29.05 kg/m²      Physical Exam  Vitals and nursing note reviewed.   Constitutional:       General: He is awake.      Appearance: He is not ill-appearing.   HENT:      Head: Normocephalic and atraumatic.      Right Ear: External ear normal.      Left Ear: External ear normal.      Nose: Nose normal.      Mouth/Throat:      Pharynx: No oropharyngeal exudate or posterior oropharyngeal erythema.   Eyes:      General:         Right eye: No discharge.         Left eye: No discharge.      Conjunctiva/sclera: Conjunctivae normal.   Cardiovascular:      Rate and Rhythm: Normal rate and regular rhythm.   Pulmonary:      Effort: Pulmonary effort is normal. No respiratory distress.      Breath sounds: Normal breath sounds. No wheezing.   Abdominal:      General: Abdomen is flat. Bowel sounds are normal.   Musculoskeletal:         General: No tenderness.      Cervical back: No rigidity. No muscular tenderness.      Right lower leg: No edema.      Left lower leg: No edema.   Skin:     General: Skin is warm and dry.      Coloration: Skin is not jaundiced.      Findings: No lesion or rash.   Neurological:      General: No focal deficit present.      Mental Status: He is alert and oriented to person, place, and time. Mental status is at baseline.   Psychiatric:         Mood and Affect: Mood normal.         Behavior: Behavior normal.         Thought Content: Thought content normal.       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-benign   • Papillary carcinoma of thyroid (HCC)    • Positive urine drug screen 03/2018    positive THC   • Sedative hypnotic or anxiolytic dependence (HCC)      Social History     Socioeconomic History   • Marital status:      Spouse name: Not on file   • Number of children: 3   • Years of education: Not on file   • Highest " education level: Not on file   Occupational History   • Occupation: apt maintenance   Tobacco Use   • Smoking status: Current Every Day Smoker     Packs/day: 0.30     Years: 41.00     Pack years: 12.30     Types: Cigarettes   • Smokeless tobacco: Never Used   Vaping Use   • Vaping Use: Never used   Substance and Sexual Activity   • Alcohol use: No     Alcohol/week: 0.0 oz     Comment: none in 12 yrs   • Drug use: No   • Sexual activity: Yes     Partners: Female   Other Topics Concern   • Not on file   Social History Narrative   • Not on file     Social Determinants of Health     Financial Resource Strain: Not on file   Food Insecurity: Not on file   Transportation Needs: Not on file   Physical Activity: Not on file   Stress: Not on file   Social Connections: Not on file   Intimate Partner Violence: Not on file   Housing Stability: Not on file     Family History   Problem Relation Age of Onset   • Thyroid Sister         s/p thyroid surgery   • Thyroid Sister      Recent Labs     10/11/21  0954 02/15/22  0744   ALBUMIN 4.7 4.6   HDL  --  47   TRIGLYCERIDE  --  77   SODIUM 141 140   POTASSIUM 4.3 4.3   CHLORIDE 104 104   CO2 26 24   BUN 21 20   CREATININE 1.57* 1.44*                             Assessment & Plan        1. KYREE (acute kidney injury) (HCC)  Etiology is not very clear, most likely hemodynamic effect of NSAIDs  Patient has no uremic symptoms  No acute need for dialysis  Recommend to discontinue NSAIDs  Recheck labs after hydration    2. Primary hypertension  Controlled  Continue same medication regimen  Continue low-sodium diet      3. Hypocalcemia  No clear etiology  And concern of hypoparathyroidism post thyroidectomy/radiation  Patient was advised to take calcium supplements on empty stomach  Recheck labs including PTH  Continue vitamin D supplement    4. Hypoparathyroidism, unspecified hypoparathyroidism type (HCC)    Check intact PTH

## 2022-04-07 DIAGNOSIS — M25.562 CHRONIC PAIN OF LEFT KNEE: ICD-10-CM

## 2022-04-07 DIAGNOSIS — G89.29 CHRONIC PAIN OF LEFT KNEE: ICD-10-CM

## 2022-04-07 RX ORDER — DICLOFENAC SODIUM 75 MG/1
TABLET, DELAYED RELEASE ORAL
Qty: 60 TABLET | Refills: 0 | Status: SHIPPED | OUTPATIENT
Start: 2022-04-07 | End: 2022-05-10

## 2022-04-13 ENCOUNTER — TELEPHONE (OUTPATIENT)
Dept: ENDOCRINOLOGY | Facility: MEDICAL CENTER | Age: 57
End: 2022-04-13
Payer: COMMERCIAL

## 2022-04-13 NOTE — TELEPHONE ENCOUNTER
VOICEMAIL  1. Caller Name: Don Olivas Jr.                        Call Back Number: [phone] (home)      2. Message: Patient called and left message stating he needed us to call him back. I have called him back and have scheduled him an appointment.     3. Patient approves office to leave a detailed voicemail/MyChart message: N\A

## 2022-04-18 ENCOUNTER — APPOINTMENT (OUTPATIENT)
Dept: ENDOCRINOLOGY | Facility: MEDICAL CENTER | Age: 57
End: 2022-04-18
Attending: INTERNAL MEDICINE
Payer: COMMERCIAL

## 2022-04-25 ENCOUNTER — HOSPITAL ENCOUNTER (OUTPATIENT)
Dept: RADIOLOGY | Facility: MEDICAL CENTER | Age: 57
End: 2022-04-25
Attending: INTERNAL MEDICINE
Payer: COMMERCIAL

## 2022-04-25 DIAGNOSIS — C73 PAPILLARY CARCINOMA OF THYROID (HCC): ICD-10-CM

## 2022-04-25 DIAGNOSIS — K11.8 MASS OF LEFT PAROTID GLAND: ICD-10-CM

## 2022-04-25 PROCEDURE — 70491 CT SOFT TISSUE NECK W/DYE: CPT

## 2022-04-25 PROCEDURE — 700117 HCHG RX CONTRAST REV CODE 255: Performed by: INTERNAL MEDICINE

## 2022-04-25 RX ADMIN — IOHEXOL 80 ML: 350 INJECTION, SOLUTION INTRAVENOUS at 08:35

## 2022-04-26 ENCOUNTER — TELEPHONE (OUTPATIENT)
Dept: ENDOCRINOLOGY | Facility: MEDICAL CENTER | Age: 57
End: 2022-04-26
Payer: COMMERCIAL

## 2022-04-26 NOTE — TELEPHONE ENCOUNTER
Received message from Dr COFFMAN stating patient needed a sooner appointment, called patient and LVM to please call back to reschedule sooner appointment.. js

## 2022-05-04 ENCOUNTER — HOSPITAL ENCOUNTER (OUTPATIENT)
Dept: RADIOLOGY | Facility: MEDICAL CENTER | Age: 57
End: 2022-05-04
Attending: OTOLARYNGOLOGY
Payer: COMMERCIAL

## 2022-05-04 DIAGNOSIS — D37.030 NEOPLASM OF UNCERTAIN BEHAVIOR OF PAROTID GLAND: ICD-10-CM

## 2022-05-04 LAB — PATHOLOGY CONSULT NOTE: NORMAL

## 2022-05-04 PROCEDURE — 76942 ECHO GUIDE FOR BIOPSY: CPT

## 2022-05-04 PROCEDURE — 88305 TISSUE EXAM BY PATHOLOGIST: CPT

## 2022-05-04 PROCEDURE — 700101 HCHG RX REV CODE 250

## 2022-05-04 PROCEDURE — 20206 BIOPSY MUSCLE PERQ NEEDLE: CPT

## 2022-05-04 RX ORDER — LIDOCAINE HYDROCHLORIDE 10 MG/ML
INJECTION, SOLUTION INFILTRATION; PERINEURAL
Status: COMPLETED
Start: 2022-05-04 | End: 2022-05-04

## 2022-05-04 RX ADMIN — LIDOCAINE HYDROCHLORIDE: 10 INJECTION, SOLUTION INFILTRATION; PERINEURAL at 08:10

## 2022-05-04 NOTE — PROGRESS NOTES
Outpatient Interventional Radiology RN Note:    US-guided Core needle biopsy of Left Parotid nodule completed by Dr. Etienne; Procedure explained by MD prior to start and consent obtained, all questions/concerns addressed; Site marked and visualized; No procedural sedation required.    Procedure completed via Left anterior neck/jaw; 1 jar of formalin obtained with 2 cores  and sent to pathology for analysis; Puncture site covered with bandaid upon completion.    Patient tolerated the procedure well; Provided with appropriate discharge education, all questions/concerns addressed; Patient discharged home.

## 2022-05-10 ENCOUNTER — HOSPITAL ENCOUNTER (OUTPATIENT)
Dept: LAB | Facility: MEDICAL CENTER | Age: 57
End: 2022-05-10
Attending: INTERNAL MEDICINE
Payer: COMMERCIAL

## 2022-05-10 ENCOUNTER — OFFICE VISIT (OUTPATIENT)
Dept: MEDICAL GROUP | Facility: PHYSICIAN GROUP | Age: 57
End: 2022-05-10
Payer: COMMERCIAL

## 2022-05-10 VITALS
HEIGHT: 66 IN | HEART RATE: 71 BPM | WEIGHT: 178 LBS | TEMPERATURE: 96.7 F | SYSTOLIC BLOOD PRESSURE: 132 MMHG | OXYGEN SATURATION: 98 % | BODY MASS INDEX: 28.61 KG/M2 | RESPIRATION RATE: 16 BRPM | DIASTOLIC BLOOD PRESSURE: 86 MMHG

## 2022-05-10 DIAGNOSIS — S46.812A STRAIN OF LEFT TRAPEZIUS MUSCLE, INITIAL ENCOUNTER: ICD-10-CM

## 2022-05-10 DIAGNOSIS — N17.9 AKI (ACUTE KIDNEY INJURY) (HCC): ICD-10-CM

## 2022-05-10 DIAGNOSIS — E20.9 HYPOPARATHYROIDISM, UNSPECIFIED HYPOPARATHYROIDISM TYPE (HCC): ICD-10-CM

## 2022-05-10 DIAGNOSIS — E83.51 HYPOCALCEMIA: ICD-10-CM

## 2022-05-10 DIAGNOSIS — K11.8 MASS OF LEFT PAROTID GLAND: ICD-10-CM

## 2022-05-10 DIAGNOSIS — I10 PRIMARY HYPERTENSION: ICD-10-CM

## 2022-05-10 LAB
ANION GAP SERPL CALC-SCNC: 11 MMOL/L (ref 7–16)
BUN SERPL-MCNC: 20 MG/DL (ref 8–22)
CALCIUM SERPL-MCNC: 7.6 MG/DL (ref 8.5–10.5)
CHLORIDE SERPL-SCNC: 104 MMOL/L (ref 96–112)
CO2 SERPL-SCNC: 25 MMOL/L (ref 20–33)
CREAT SERPL-MCNC: 1.31 MG/DL (ref 0.5–1.4)
ERYTHROCYTE [DISTWIDTH] IN BLOOD BY AUTOMATED COUNT: 42.8 FL (ref 35.9–50)
GFR SERPLBLD CREATININE-BSD FMLA CKD-EPI: 63 ML/MIN/1.73 M 2
GLUCOSE SERPL-MCNC: 82 MG/DL (ref 65–99)
HCT VFR BLD AUTO: 47.4 % (ref 42–52)
HGB BLD-MCNC: 16.4 G/DL (ref 14–18)
MCH RBC QN AUTO: 31.7 PG (ref 27–33)
MCHC RBC AUTO-ENTMCNC: 34.6 G/DL (ref 33.7–35.3)
MCV RBC AUTO: 91.7 FL (ref 81.4–97.8)
PLATELET # BLD AUTO: 194 K/UL (ref 164–446)
PMV BLD AUTO: 10.8 FL (ref 9–12.9)
POTASSIUM SERPL-SCNC: 4 MMOL/L (ref 3.6–5.5)
RBC # BLD AUTO: 5.17 M/UL (ref 4.7–6.1)
SODIUM SERPL-SCNC: 140 MMOL/L (ref 135–145)
WBC # BLD AUTO: 5.9 K/UL (ref 4.8–10.8)

## 2022-05-10 PROCEDURE — 99213 OFFICE O/P EST LOW 20 MIN: CPT | Performed by: FAMILY MEDICINE

## 2022-05-10 PROCEDURE — 84439 ASSAY OF FREE THYROXINE: CPT

## 2022-05-10 PROCEDURE — 84432 ASSAY OF THYROGLOBULIN: CPT

## 2022-05-10 PROCEDURE — 80048 BASIC METABOLIC PNL TOTAL CA: CPT

## 2022-05-10 PROCEDURE — 82306 VITAMIN D 25 HYDROXY: CPT

## 2022-05-10 PROCEDURE — 84443 ASSAY THYROID STIM HORMONE: CPT

## 2022-05-10 PROCEDURE — 85027 COMPLETE CBC AUTOMATED: CPT

## 2022-05-10 PROCEDURE — 80053 COMPREHEN METABOLIC PANEL: CPT

## 2022-05-10 PROCEDURE — 36415 COLL VENOUS BLD VENIPUNCTURE: CPT

## 2022-05-10 PROCEDURE — 86800 THYROGLOBULIN ANTIBODY: CPT

## 2022-05-10 PROCEDURE — 83970 ASSAY OF PARATHORMONE: CPT

## 2022-05-10 RX ORDER — TIZANIDINE HYDROCHLORIDE 2 MG/1
2 CAPSULE, GELATIN COATED ORAL 3 TIMES DAILY
Qty: 30 CAPSULE | Refills: 0 | Status: SHIPPED | OUTPATIENT
Start: 2022-05-10 | End: 2022-06-28

## 2022-05-10 ASSESSMENT — ENCOUNTER SYMPTOMS
DIZZINESS: 0
VOMITING: 0
CHILLS: 0
MYALGIAS: 1
PALPITATIONS: 0
COUGH: 0
FEVER: 0
DOUBLE VISION: 0
TINGLING: 1
SORE THROAT: 0
SHORTNESS OF BREATH: 0
HEADACHES: 0
NAUSEA: 0
BLURRED VISION: 0

## 2022-05-10 ASSESSMENT — FIBROSIS 4 INDEX: FIB4 SCORE: 1.99

## 2022-05-10 NOTE — ASSESSMENT & PLAN NOTE
Unknown etiology  Likely not neurologic or cardiologic  L trap hypertrophy impingement ?  Trial of nsaids, hot pack, stretching, msk relaxant  Discussed ADRs  (dont use with sleeping pill)  If not resolved will see if trigger point injections help with PMR  Also discuss massage of area  L trap feels hypertrophied

## 2022-05-10 NOTE — PROGRESS NOTES
Subjective:     CC: L arm/neck numbness    HPI:   Don presents today with     1) 3wk L arm/neck numbness with certain movements  Comes and goes  Has to adjust shoulder and neck to avoid symptoms   No injury  Not related to walking or activity  No CAD     No specific moment 3 wks ago that come to mind  Was going lifting and construct  L trap feels stiff to patient  Using mobic  Saturday night palsy setting?  No new pillows or mattress          Problem   Strain of Left Trapezius Muscle       Current Outpatient Medications Ordered in Epic   Medication Sig Dispense Refill   • tizanidine (ZANAFLEX) 2 MG capsule Take 1 Capsule by mouth 3 times a day. 30 Capsule 0   • atorvastatin (LIPITOR) 20 MG Tab Take 1 Tablet by mouth every day. 90 Tablet 1   • levothyroxine (SYNTHROID) 88 MCG Tab Take 1 Tablet by mouth every morning on an empty stomach. 90 Tablet 2   • losartan (COZAAR) 100 MG Tab TAKE 1 TABLET BY MOUTH EVERY DAY 90 Tablet 1   • MAGNESIUM PO Take  by mouth.     • Aug Betamethasone Dipropionate (DIPROLENE-AF) 0.05 % Cream APPLY A THIN LAYER TO THE AFFECTED AREA TWICE DAILY 30 g 1   • Vitamin D, Ergocalciferol, 2000 units Cap Take  by mouth.     • Calcium Carb-Cholecalciferol (CALCIUM 600 + D PO) Take  by mouth 2 Times a Day.       No current [facility]-ordered facility-administered medications on file.     ROS:  Review of Systems   Constitutional: Negative for chills and fever.   HENT: Negative for ear pain and sore throat.    Eyes: Negative for blurred vision and double vision.   Respiratory: Negative for cough and shortness of breath.    Cardiovascular: Negative for chest pain and palpitations.   Gastrointestinal: Negative for nausea and vomiting.   Genitourinary: Negative for dysuria and hematuria.   Musculoskeletal: Positive for myalgias.   Neurological: Positive for tingling. Negative for dizziness and headaches.       Objective:     Exam:  /86 (BP Location: Left arm, Patient Position: Sitting, BP Cuff Size:  "Adult long)   Pulse 71   Temp 35.9 °C (96.7 °F) (Temporal)   Resp 16   Ht 1.676 m (5' 6\")   Wt 80.7 kg (178 lb)   SpO2 98%   BMI 28.73 kg/m²  Body mass index is 28.73 kg/m².    Physical Exam  Constitutional:       General: He is not in acute distress.     Appearance: Normal appearance. He is not ill-appearing, toxic-appearing or diaphoretic.   HENT:      Head: Normocephalic and atraumatic.   Eyes:      General: No scleral icterus.        Right eye: No discharge.         Left eye: No discharge.      Extraocular Movements: Extraocular movements intact.      Conjunctiva/sclera: Conjunctivae normal.      Pupils: Pupils are equal, round, and reactive to light.   Pulmonary:      Effort: No respiratory distress.   Abdominal:      General: There is no distension.   Musculoskeletal:         General: Tenderness present. No deformity or signs of injury.   Neurological:      Mental Status: He is alert.      Coordination: Coordination normal.      Gait: Gait normal.     L trap muscle enlarged?    Assessment & Plan:     56 y.o. male with the following -     Problem List Items Addressed This Visit     Strain of left trapezius muscle     Unknown etiology  Likely not neurologic or cardiologic  L trap hypertrophy impingement ?  Trial of nsaids, hot pack, stretching, msk relaxant  Discussed ADRs  (dont use with sleeping pill)  If not resolved will see if trigger point injections help with PMR  Also discuss massage of area  L trap feels hypertrophied          Relevant Medications    tizanidine (ZANAFLEX) 2 MG capsule        No follow-ups on file.    Please note that this dictation was created using voice recognition software. I have made every reasonable attempt to correct obvious errors, but I expect that there are errors of grammar and possibly content that I did not discover before finalizing the note.      "

## 2022-05-11 LAB
25(OH)D3 SERPL-MCNC: 73 NG/ML (ref 30–100)
ALBUMIN SERPL BCP-MCNC: 4.4 G/DL (ref 3.2–4.9)
ALBUMIN/GLOB SERPL: 1.5 G/DL
ALP SERPL-CCNC: 68 U/L (ref 30–99)
ALT SERPL-CCNC: 28 U/L (ref 2–50)
ANION GAP SERPL CALC-SCNC: 13 MMOL/L (ref 7–16)
AST SERPL-CCNC: 29 U/L (ref 12–45)
BILIRUB SERPL-MCNC: 0.5 MG/DL (ref 0.1–1.5)
BUN SERPL-MCNC: 19 MG/DL (ref 8–22)
CALCIUM SERPL-MCNC: 7.5 MG/DL (ref 8.5–10.5)
CHLORIDE SERPL-SCNC: 105 MMOL/L (ref 96–112)
CO2 SERPL-SCNC: 23 MMOL/L (ref 20–33)
CREAT SERPL-MCNC: 1.39 MG/DL (ref 0.5–1.4)
GFR SERPLBLD CREATININE-BSD FMLA CKD-EPI: 59 ML/MIN/1.73 M 2
GLOBULIN SER CALC-MCNC: 2.9 G/DL (ref 1.9–3.5)
GLUCOSE SERPL-MCNC: 82 MG/DL (ref 65–99)
POTASSIUM SERPL-SCNC: 4.1 MMOL/L (ref 3.6–5.5)
PROT SERPL-MCNC: 7.3 G/DL (ref 6–8.2)
PTH-INTACT SERPL-MCNC: 14.3 PG/ML (ref 14–72)
SODIUM SERPL-SCNC: 141 MMOL/L (ref 135–145)
T4 FREE SERPL-MCNC: 1.41 NG/DL (ref 0.93–1.7)
TSH SERPL DL<=0.005 MIU/L-ACNC: 1.8 UIU/ML (ref 0.38–5.33)

## 2022-05-12 LAB
THYROGLOB AB SERPL-ACNC: <0.9 IU/ML (ref 0–4)
THYROGLOB SERPL-MCNC: 0.6 NG/ML (ref 1.3–31.8)
THYROGLOB SERPL-MCNC: ABNORMAL NG/ML (ref 1.3–31.8)

## 2022-05-13 DIAGNOSIS — I10 ESSENTIAL HYPERTENSION: ICD-10-CM

## 2022-05-13 RX ORDER — LOSARTAN POTASSIUM 100 MG/1
TABLET ORAL
Qty: 90 TABLET | Refills: 1 | Status: SHIPPED | OUTPATIENT
Start: 2022-05-13 | End: 2022-11-21

## 2022-06-13 ENCOUNTER — OFFICE VISIT (OUTPATIENT)
Dept: MEDICAL GROUP | Facility: PHYSICIAN GROUP | Age: 57
End: 2022-06-13
Payer: COMMERCIAL

## 2022-06-13 VITALS
WEIGHT: 175.71 LBS | HEART RATE: 99 BPM | DIASTOLIC BLOOD PRESSURE: 90 MMHG | BODY MASS INDEX: 28.24 KG/M2 | HEIGHT: 66 IN | TEMPERATURE: 98.1 F | OXYGEN SATURATION: 95 % | SYSTOLIC BLOOD PRESSURE: 140 MMHG

## 2022-06-13 DIAGNOSIS — G89.29 CHRONIC PAIN OF LEFT KNEE: ICD-10-CM

## 2022-06-13 DIAGNOSIS — K11.8 MASS OF LEFT PAROTID GLAND: ICD-10-CM

## 2022-06-13 DIAGNOSIS — E89.0 POSTSURGICAL HYPOTHYROIDISM: ICD-10-CM

## 2022-06-13 DIAGNOSIS — I10 ESSENTIAL HYPERTENSION: ICD-10-CM

## 2022-06-13 DIAGNOSIS — E78.5 DYSLIPIDEMIA: ICD-10-CM

## 2022-06-13 DIAGNOSIS — N18.30 STAGE 3 CHRONIC KIDNEY DISEASE, UNSPECIFIED WHETHER STAGE 3A OR 3B CKD: ICD-10-CM

## 2022-06-13 DIAGNOSIS — G56.92 UPPER EXTREMITY NEUROPATHY, LEFT: ICD-10-CM

## 2022-06-13 DIAGNOSIS — R25.2 LEG CRAMPS: ICD-10-CM

## 2022-06-13 DIAGNOSIS — E55.9 VITAMIN D DEFICIENCY: ICD-10-CM

## 2022-06-13 DIAGNOSIS — M25.562 CHRONIC PAIN OF LEFT KNEE: ICD-10-CM

## 2022-06-13 PROCEDURE — 99214 OFFICE O/P EST MOD 30 MIN: CPT | Performed by: FAMILY MEDICINE

## 2022-06-13 RX ORDER — AMLODIPINE BESYLATE 5 MG/1
5 TABLET ORAL DAILY
Qty: 90 TABLET | Refills: 1 | Status: SHIPPED | OUTPATIENT
Start: 2022-06-13 | End: 2022-11-23 | Stop reason: SDUPTHER

## 2022-06-13 RX ORDER — GABAPENTIN 300 MG/1
300 CAPSULE ORAL 3 TIMES DAILY
Qty: 30 CAPSULE | Refills: 2 | Status: SHIPPED | OUTPATIENT
Start: 2022-06-13 | End: 2022-10-14

## 2022-06-13 ASSESSMENT — FIBROSIS 4 INDEX: FIB4 SCORE: 1.61

## 2022-06-14 ENCOUNTER — OFFICE VISIT (OUTPATIENT)
Dept: NEPHROLOGY | Facility: MEDICAL CENTER | Age: 57
End: 2022-06-14
Payer: COMMERCIAL

## 2022-06-14 VITALS
TEMPERATURE: 98.3 F | SYSTOLIC BLOOD PRESSURE: 114 MMHG | BODY MASS INDEX: 28.03 KG/M2 | DIASTOLIC BLOOD PRESSURE: 76 MMHG | HEIGHT: 66 IN | HEART RATE: 84 BPM | OXYGEN SATURATION: 97 % | WEIGHT: 174.4 LBS

## 2022-06-14 DIAGNOSIS — N18.9 CHRONIC KIDNEY DISEASE, UNSPECIFIED CKD STAGE: ICD-10-CM

## 2022-06-14 DIAGNOSIS — I10 PRIMARY HYPERTENSION: ICD-10-CM

## 2022-06-14 DIAGNOSIS — E83.51 HYPOCALCEMIA: ICD-10-CM

## 2022-06-14 PROCEDURE — 99214 OFFICE O/P EST MOD 30 MIN: CPT | Performed by: INTERNAL MEDICINE

## 2022-06-14 RX ORDER — ZOLPIDEM TARTRATE 10 MG/1
10 TABLET ORAL
COMMUNITY
Start: 2022-06-05 | End: 2022-07-12 | Stop reason: SDUPTHER

## 2022-06-14 ASSESSMENT — ENCOUNTER SYMPTOMS
CHILLS: 0
SHORTNESS OF BREATH: 0
NAUSEA: 0
FEVER: 0
MYALGIAS: 1
COUGH: 0
VOMITING: 0
HYPERTENSION: 1

## 2022-06-14 ASSESSMENT — FIBROSIS 4 INDEX: FIB4 SCORE: 1.61

## 2022-06-14 NOTE — PROGRESS NOTES
"Frank Olivas Jr. is a 56 y.o. male who presents with Hypertension and Chronic Kidney Disease            Patient has a history of hypocalcemia, he is getting muscle cramps occasionally.    Hypertension  This is a chronic problem. The current episode started more than 1 year ago. The problem is unchanged. The problem is controlled. Pertinent negatives include no chest pain, malaise/fatigue or shortness of breath. Agents associated with hypertension include thyroid hormones. Risk factors for coronary artery disease include male gender. Past treatments include calcium channel blockers and angiotensin blockers. The current treatment provides significant improvement. There are no compliance problems.  Hypertensive end-organ damage includes kidney disease. Identifiable causes of hypertension include chronic renal disease.   Chronic Kidney Disease  This is a chronic problem. The current episode started more than 1 year ago. The problem occurs constantly. The problem has been unchanged. Associated symptoms include myalgias. Pertinent negatives include no chest pain, chills, coughing, fever, nausea or vomiting.       Review of Systems   Constitutional: Negative for chills, fever and malaise/fatigue.   Respiratory: Negative for cough and shortness of breath.    Cardiovascular: Negative for chest pain and leg swelling.   Gastrointestinal: Negative for nausea and vomiting.   Genitourinary: Negative for dysuria, frequency and urgency.   Musculoskeletal: Positive for myalgias.              Objective     /76 (BP Location: Right arm, Patient Position: Sitting)   Pulse 84   Temp 36.8 °C (98.3 °F)   Ht 1.676 m (5' 6\")   Wt 79.1 kg (174 lb 6.4 oz)   SpO2 97%   BMI 28.15 kg/m²      Physical Exam  Vitals and nursing note reviewed.   Constitutional:       General: He is not in acute distress.     Appearance: He is not ill-appearing.   HENT:      Head: Normocephalic and atraumatic.      Right Ear: " External ear normal.      Left Ear: External ear normal.      Nose: Nose normal.   Eyes:      General:         Right eye: No discharge.         Left eye: No discharge.      Conjunctiva/sclera: Conjunctivae normal.   Cardiovascular:      Rate and Rhythm: Normal rate and regular rhythm.      Heart sounds: No murmur heard.  Pulmonary:      Effort: Pulmonary effort is normal. No respiratory distress.      Breath sounds: Normal breath sounds. No wheezing.   Musculoskeletal:         General: No tenderness or deformity.      Right lower leg: No edema.      Left lower leg: No edema.   Skin:     General: Skin is warm.   Neurological:      General: No focal deficit present.      Mental Status: He is alert and oriented to person, place, and time.   Psychiatric:         Mood and Affect: Mood normal.         Behavior: Behavior normal.       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-benign   • Papillary carcinoma of thyroid (HCC)    • Positive urine drug screen 03/2018    positive THC   • Sedative hypnotic or anxiolytic dependence (HCC)      Social History     Socioeconomic History   • Marital status:      Spouse name: Not on file   • Number of children: 3   • Years of education: Not on file   • Highest education level: Not on file   Occupational History   • Occupation: apt maintenance   Tobacco Use   • Smoking status: Current Every Day Smoker     Packs/day: 0.30     Years: 41.00     Pack years: 12.30     Types: Cigarettes   • Smokeless tobacco: Never Used   Vaping Use   • Vaping Use: Never used   Substance and Sexual Activity   • Alcohol use: No     Alcohol/week: 0.0 oz     Comment: none in 12 yrs   • Drug use: No   • Sexual activity: Yes     Partners: Female   Other Topics Concern   • Not on file   Social History Narrative   • Not on file     Social Determinants of Health     Financial Resource Strain: Not on  file   Food Insecurity: Not on file   Transportation Needs: Not on file   Physical Activity: Not on file   Stress: Not on file   Social Connections: Not on file   Intimate Partner Violence: Not on file   Housing Stability: Not on file     Family History   Problem Relation Age of Onset   • Thyroid Sister         s/p thyroid surgery   • Thyroid Sister      Recent Labs     10/11/21  0954 02/15/22  0744 05/10/22  0945 05/10/22  0946   ALBUMIN 4.7 4.6 4.4  --    HDL  --  47  --   --    TRIGLYCERIDE  --  77  --   --    SODIUM 141 140 141 140   POTASSIUM 4.3 4.3 4.1 4.0   CHLORIDE 104 104 105 104   CO2 26 24 23 25   BUN 21 20 19 20   CREATININE 1.57* 1.44* 1.39 1.31                             Assessment & Plan        1. Chronic kidney disease, unspecified CKD stage  Stable  No uremic symptoms  Renal dose of medication  Avoid nephrotoxins  Continue same medication regimen      2. Primary hypertension  Controlled  Continue same medication regimen  Continue low-sodium diet      3. Hypocalcemia  Increase calcium supplement  Recheck labs

## 2022-06-14 NOTE — PROGRESS NOTES
Subjective     Don Olivas Jr. is a 56 y.o. male who presents with follow-up of medical problems          HPI     Patient is here for follow-up of medical problems.    He was seen a month ago by Dr. Sheridan here in our office and was diagnosed with strain of the left trapezius.  He said he has burning sensation/shooting pain from the left upper arm going into the shoulder, left side of the neck and into the left ear.  He said he has to shake his left ear he said they are not as frequent as when they first started.  Twhen when he has the episodes.  Tylenol does not work.  He also tried meloxicam and this is not helping.  He denies any trauma.  No limitation of range of movement of the shoulder.    He underwent biopsy of the mass of the left parotid gland which came back no malignancy but inconclusive.  His ENT specialist recommended complete excision.    In terms of his hypertension, he continues to take losartan 100 mg daily.  Blood pressure is still not adequately controlled and on today's visit it is at 140/90.    We follow him for CKD stage III.  He has been seen and evaluated by Dr. Juan, nephrologist who thought this may be from NSAIDs.  We have recommended to him to discontinue all NSAIDs.    For the dyslipidemia, he continues to take atorvastatin 20 mg at bedtime.    We follow him for postsurgical hypothyroidism due to thyroid cancer.  He is also followed by the endocrinologist.  He continues to take thyroid replacement.    He takes vitamin D supplementation for vitamin D deficiency.    He developed hypoparathyroidism with hypocalcemia post thyroidectomy and radiation.  He is on calcium and vitamin D supplement.    He continues to have problems with pain in the left knee.  This is a chronic problem.  X-ray showed degenerative change which was mild and MRI showed torn posterior horn of the medial meniscus.  He has been seen and evaluated by the orthopedist with [facility]  "who recommended conservative treatment first with physical therapy.  Patient however has not done the therapy.  Patient cannot take NSAIDs because of CKD.    He also complains of leg cramps which he feels at night.    Past medical history, past surgical history, family history reviewed-no changes    Social history reviewed-no changes    Allergies reviewed-no changes    Medications reviewed-no changes    ROS     As per HPI, the rest are negative.           Objective     BP (!) 140/90   Pulse 99   Temp 36.7 °C (98.1 °F) (Temporal)   Ht 1.676 m (5' 6\")   Wt 79.7 kg (175 lb 11.3 oz)   SpO2 95%   BMI 28.36 kg/m²      Physical Exam     Examined alert, awake, oriented, not in distress    Neck-supple, no lymphadenopathy, no thyromegaly, full range of movement, no pinpoint tenderness, no spasm, palpable mass left parotid area which is an old finding  Lungs-clear to auscultation, no rales, no wheezes  Heart-regular rate and rhythm, no murmur  Extremities-no edema, clubbing, cyanosis, left upper extremity exam does strong radial pulse, no swelling, no redness, full range of movement of the left shoulder, no tenderness AC joint, no tenderness subacromial area, intact neurovascular status left upper extremity, strong  left hand, left knee exam- no effusion, no redness, no pinpoint tenderness, no laxity, no instability             Hospital Outpatient Visit on 05/10/2022   Component Date Value Ref Range Status   • Pth, Intact 05/10/2022 14.3  14.0 - 72.0 pg/mL Final   • WBC 05/10/2022 5.9  4.8 - 10.8 K/uL Final   • RBC 05/10/2022 5.17  4.70 - 6.10 M/uL Final   • Hemoglobin 05/10/2022 16.4  14.0 - 18.0 g/dL Final   • Hematocrit 05/10/2022 47.4  42.0 - 52.0 % Final   • MCV 05/10/2022 91.7  81.4 - 97.8 fL Final   • MCH 05/10/2022 31.7  27.0 - 33.0 pg Final   • MCHC 05/10/2022 34.6  33.7 - 35.3 g/dL Final   • RDW 05/10/2022 42.8  35.9 - 50.0 fL Final   • Platelet Count 05/10/2022 194  164 - 446 K/uL Final   • MPV 05/10/2022 " 10.8  9.0 - 12.9 fL Final   • Sodium 05/10/2022 140  135 - 145 mmol/L Final   • Potassium 05/10/2022 4.0  3.6 - 5.5 mmol/L Final   • Chloride 05/10/2022 104  96 - 112 mmol/L Final   • Co2 05/10/2022 25  20 - 33 mmol/L Final   • Glucose 05/10/2022 82  65 - 99 mg/dL Final   • Bun 05/10/2022 20  8 - 22 mg/dL Final   • Creatinine 05/10/2022 1.31  0.50 - 1.40 mg/dL Final   • Calcium 05/10/2022 7.6 (A) 8.5 - 10.5 mg/dL Final   • Anion Gap 05/10/2022 11.0  7.0 - 16.0 Final   • GFR (CKD-EPI) 05/10/2022 63  >60 mL/min/1.73 m 2 Final    Comment: Effective 3/15/2022, estimated Glomerular Filtration Rate  is calculated using race neutral CKD-EPI 2021 equation  per NKF-ASN recommendations.        Latest Reference Range & Units 05/10/22 09:45 05/10/22 09:46   25-Hydroxy   Vitamin D 25 30 - 100 ng/mL 73    TSH 0.380 - 5.330 uIU/mL 1.800    Free T-4 0.93 - 1.70 ng/dL 1.41    Thyroglobulin by LC-MC/MS-S/P 1.3 - 31.8 ng/mL Not Applicable    Thyroglobulin 1.3 - 31.8 ng/mL 0.6 (L)    Pth, Intact 14.0 - 72.0 pg/mL  14.3   Anti-Thyroglobulin 0.0 - 4.0 IU/mL <0.9    (L): Data is abnormally low           Assessment & Plan        1. Upper extremity neuropathy, left  I am thinking this could be mild neuropathy in the left upper arm radiating to the neck and the left ear.  We will give a trial of gabapentin 300 mg 1 tablet at bedtime.  This should also help with his leg cramps and his knee pain.  Discussed potential side effects.  Recheck in a month.  - gabapentin (NEURONTIN) 300 MG Cap; Take 1 Capsule by mouth 3 times a day.  Dispense: 30 Capsule; Refill: 2    2. Mass of left parotid gland  Status post biopsy which came back no malignancy but inconclusive results.  He was advised to have complete excision.  He is still not decided to proceed with this.  I explained to him that this is the best way to diagnose it accurately.    3. Essential hypertension  Not adequately controlled.  We will add second agent which is amlodipine 5 mg 1 tablet  daily.  Advised to take amlodipine at night.  Continue losartan 100 mg daily.  Monitor blood pressure at home and keep a record and bring on return.  He will also bring his blood pressure machine but advised to watch his salt intake.  - amLODIPine (NORVASC) 5 MG Tab; Take 1 Tablet by mouth every day.  Dispense: 90 Tablet; Refill: 1    4. Stage 3 chronic kidney disease, unspecified whether stage 3a or 3b CKD (HCC)  Kidney function improved this is already a normal EGFR.  Continue avoidance of nephrotoxic agents including NSAIDs.    5. Dyslipidemia  All at target per last blood work in February 2022.  Continue atorvastatin.    6. Postsurgical hypothyroidism  This is managed by endocrinologist.  He is on thyroid replacement.    7. Vitamin D deficiency  Normal vitamin D level per last blood work in May 2022.  Continue same dose of vitamin D supplementation.    8. Chronic pain of left knee  I discussed with him referral to physical therapy and if there is no improvement with this conservative treatment then he will need to go back and see his orthopedist for arthroscopic surgery.  He agrees to proceed.  Avoid NSAIDs.  May take Tylenol as needed.  We just started gabapentin and hopefully this would help as well.  - Referral to Physical Therapy    9. Leg cramps  This may be from the hypocalcemia.  Patient is already on calcium supplement.  Hopefully the gabapentin will also help with the leg cramps.    I will see him for follow-up in a month.      Please note that this dictation was created using voice recognition software. I have worked with consultants from the vendor as well as technical experts from [facility]  [place] to optimize the interface. I have made every reasonable attempt to correct obvious errors, but I expect that there are errors of grammar and possibly content I did not discover before finalizing the note.

## 2022-06-28 ENCOUNTER — OFFICE VISIT (OUTPATIENT)
Dept: ENDOCRINOLOGY | Facility: MEDICAL CENTER | Age: 57
End: 2022-06-28
Attending: INTERNAL MEDICINE
Payer: COMMERCIAL

## 2022-06-28 VITALS
SYSTOLIC BLOOD PRESSURE: 122 MMHG | BODY MASS INDEX: 27.97 KG/M2 | HEART RATE: 77 BPM | HEIGHT: 66 IN | OXYGEN SATURATION: 96 % | WEIGHT: 174 LBS | DIASTOLIC BLOOD PRESSURE: 76 MMHG

## 2022-06-28 DIAGNOSIS — E89.0 POSTSURGICAL HYPOTHYROIDISM: ICD-10-CM

## 2022-06-28 DIAGNOSIS — C73 PAPILLARY CARCINOMA OF THYROID (HCC): ICD-10-CM

## 2022-06-28 DIAGNOSIS — E55.9 VITAMIN D DEFICIENCY: ICD-10-CM

## 2022-06-28 DIAGNOSIS — E89.0 POSTOPERATIVE HYPOTHYROIDISM: ICD-10-CM

## 2022-06-28 DIAGNOSIS — K11.8 MASS OF LEFT PAROTID GLAND: ICD-10-CM

## 2022-06-28 DIAGNOSIS — E89.2 POSTSURGICAL HYPOPARATHYROIDISM (HCC): ICD-10-CM

## 2022-06-28 PROCEDURE — 99211 OFF/OP EST MAY X REQ PHY/QHP: CPT | Performed by: INTERNAL MEDICINE

## 2022-06-28 PROCEDURE — 99214 OFFICE O/P EST MOD 30 MIN: CPT | Performed by: INTERNAL MEDICINE

## 2022-06-28 RX ORDER — CALCITRIOL 0.25 UG/1
0.25 CAPSULE, LIQUID FILLED ORAL 2 TIMES DAILY
Qty: 180 CAPSULE | Refills: 2 | Status: SHIPPED | OUTPATIENT
Start: 2022-06-28 | End: 2023-01-23 | Stop reason: SDUPTHER

## 2022-06-28 RX ORDER — MELOXICAM 15 MG/1
15 TABLET ORAL DAILY
COMMUNITY
End: 2022-07-12 | Stop reason: SDUPTHER

## 2022-06-28 RX ORDER — LEVOTHYROXINE SODIUM 88 UG/1
88 TABLET ORAL
Qty: 90 TABLET | Refills: 2 | Status: SHIPPED | OUTPATIENT
Start: 2022-06-28 | End: 2022-10-26

## 2022-06-28 ASSESSMENT — FIBROSIS 4 INDEX: FIB4 SCORE: 1.61

## 2022-06-28 NOTE — PROGRESS NOTES
CHIEF COMPLAINT: Followup of postsurgical hypothyroidism and papillary thyroid carcinoma.    HPI:   Don Olivas Jr. is a 56 y.o. male, who had initial total thyroidectomy on 6/2007 by Dr. Lindsey with pathology revealing papillary thyroid carcinoma 2.0 cm  Right lobe and 0.3 cm left lobe with no ETE.  He did not have problems with postoperative hypocalcemia.   He was not treated with radioactive iodine post surgery initially.    He was seen by Dr. Lovell and Dr. Kang  and most recently he was seen by Dr. Hyatt.    US on 2015 - He was found to a suspicious mass on the right thyroid bed ntermediate echogenicity material in the right thyroid bed measuring 12 x 22 x 13 mm. In the center of this there is an indistinct hypoechoic region measuring 8 x 8 mm. There is internal vascular flow. No calcification.  In the left thyroid bed there is what also appears to represent some thyroid gland measuring 12 x 7 x 6 mm    Biopsy was not done for these masses    It was presumed by his previous endocrinologists that these masses represented residual thyroid tissue and he was referred to Dr. Lindsey for completion thyroidectomy.  However Dr. Lindsey required an FNA for the suspicious masses which never got completed for unclear reasons    The patient then received 50 mCi-131 on 9/2019 under the orders of Dr. Hyatt.  Post treatment scan showed:  3 discrete foci of increased uptake in the lower neck with associated star artifact. This presumably represents residual or recurrent thyroid tissue or disease. Cannot exclude any christiano disease.  Normal activity is seen in the liver, spleen     Follow-up neck US on Jan 2021 showed:   The right lobe of the thyroid gland measures 0.70 cm x 1.22 cm x 0.79 cm. The contour and echogenicity are normal. No focal mass lesions are identified.   The left lobe of the thyroid gland measures 0.62 cm x 0.75 cm x 0.46 cm. The contour and echogenicity are normal. No focal mass  lesions are identified.      Incidentally he also has a parotid mass which is now being evaluated by Dr. Chevalier at [facility].  I scheduled him for a neck CT on April 25, 2022 which showed no suspicious lymph nodes or masses on the thyroid bed.       His unstimulated thyroglobulin was 2.2 with negative antibodies in Aug 2019  Unstimulated thyroglobulin was 0.8 with negative antibodies in January 2020  Unstimulated thyroglobulin was 0.5 with negative antibodies in July 2020  Unstimulated thyroglobulin was 0.5 with negative antibodies on January 2021  Unstimulated thyroglobulin was 0.6 with negative antibodies on May 2022      Since last visit, he has remained on Levothyroxine 88 micrograms daily, which has been his dose since 12 months.  He is feeling well.  Energy level has been excellent.  Weight is Stable.  No palpitations, no frequent diarrhea, or frequent constipation.  No heat or cold intolerance.  No anterior neck symptoms or problems.  No voice changes.    His TSH was 1.8 with a free T4 of 1.41 on May 10, 2022.      Incidentally he has been dealing with hypocalcemia with inappropriately low intact PTH levels since February 2022  His ionized calcium was 1.0 with a magnesium of 2.1 vitamin D 53 and low PTH of 12 on 3/16/2022 compatible with hypoparathyroidism    He informed today that he has been taking 600 mg of calcium carbonate 6 times a day just to keep his calcium normal.    His last serum calcium on May 10, 2022 was low at 7.6 with an inappropriately low PTH of 14 compatible with hypoparathyroidism        Aside from this the patient has mass on the left parotid gland seen on ultrasound.  It was first discovered in 2007.  It originally measured 15 x 13 x 12 mm and on ultrasound in 2015 it measured 22 x 20 x 11 mm  Repeat CT scan on April 25, 2022 showed a 1.9 x 1.0 x 1.3 cm calcified lesion on the left parotid gland without apparent internal enhancement.  Incidentally the patient underwent core needle  "biopsy of this lesion which was nondiagnostic.          Patient's medications, allergies, and social histories were reviewed and updated as appropriate.      ROS:      CONS:     No fever, no chills   EYES:     No diplopia, no blurry vision   CV:           No chest pain, no palpitations   PULM:     No SOB, no cough, no hemoptysis.   GI:            No nausea, no vomiting, no diarrhea, no constipation   ENDO:     No polyuria, no polydipsia, no heat intolerance, no cold intolerance       Past Medical History:  Problem List:  2022-06: Postsurgical hypoparathyroidism (HCC)  2022-05: Strain of left trapezius muscle  2020-09: Right knee pain  2015-10: Vitamin D deficiency  2015-07: Essential hypertension  2015-06: Parotid gland enlargement  2015-04: HTN (hypertension)  2013-01: ASTHMA  2012-10: Postsurgical hypothyroidism  Hypothyroidism  Insomnia  Papillary carcinoma of thyroid (HCC)  HTN (hypertension)  Sedative, hypnotic or anxiolytic dependence (HCC)      Past Surgical History:  Past Surgical History:   Procedure Laterality Date   • COLONOSCOPY  05/08/2018    mild divertoculosis sigmoid colon, int hemorrhoids   • THYROIDECTOMY TOTAL  2007    due to thyroid cancer        Allergies:  Lisinopril     Social History:  Social History     Tobacco Use   • Smoking status: Current Every Day Smoker     Packs/day: 0.30     Years: 41.00     Pack years: 12.30     Types: Cigarettes   • Smokeless tobacco: Never Used   Vaping Use   • Vaping Use: Never used   Substance Use Topics   • Alcohol use: No     Alcohol/week: 0.0 oz     Comment: none in 12 yrs   • Drug use: No        Family History:   family history includes Thyroid in his sister and sister.      PHYSICAL EXAM:   Vital signs: /76 (BP Location: Left arm, Patient Position: Sitting, BP Cuff Size: Adult)   Pulse 77   Ht 1.676 m (5' 6\")   Wt 78.9 kg (174 lb)   SpO2 96%   BMI 28.08 kg/m²   GENERAL: Well-developed, well-nourished in no apparent distress.   EYE:  No ocular " asymmetry, PERRLA  HENT: Pink, moist mucous membranes.    NECK: Well healed transverse scar, Thyroid is surgically absent   There is a palpable firm movable mass measuring more than 2 cm located inferior to the left parotid gland just below the angle of the jaw.  CARDIOVASCULAR:  No murmurs  LUNGS: Clear breath sounds  ABDOMEN: Soft, nontender   EXTREMITIES: No clubbing, cyanosis, or edema.   NEUROLOGICAL: No gross focal motor abnormalities   LYMPH: No cervical adenopathy palpated.   SKIN: No rashes, lesions.       Labs:  Lab Results   Component Value Date/Time    WBC 5.9 05/10/2022 09:46 AM    RBC 5.17 05/10/2022 09:46 AM    HEMOGLOBIN 16.4 05/10/2022 09:46 AM    MCV 91.7 05/10/2022 09:46 AM    MCH 31.7 05/10/2022 09:46 AM    MCHC 34.6 05/10/2022 09:46 AM    RDW 42.8 05/10/2022 09:46 AM    MPV 10.8 05/10/2022 09:46 AM       Lab Results   Component Value Date/Time    SODIUM 140 05/10/2022 09:46 AM    POTASSIUM 4.0 05/10/2022 09:46 AM    CHLORIDE 104 05/10/2022 09:46 AM    CO2 25 05/10/2022 09:46 AM    ANION 11.0 05/10/2022 09:46 AM    GLUCOSE 82 05/10/2022 09:46 AM    BUN 20 05/10/2022 09:46 AM    CREATININE 1.31 05/10/2022 09:46 AM    CREATININE 1.0 06/30/2007 08:25 AM    CALCIUM 7.6 (L) 05/10/2022 09:46 AM    ASTSGOT 29 05/10/2022 09:45 AM    ALTSGPT 28 05/10/2022 09:45 AM    TBILIRUBIN 0.5 05/10/2022 09:45 AM    ALBUMIN 4.4 05/10/2022 09:45 AM    TOTPROTEIN 7.3 05/10/2022 09:45 AM    GLOBULIN 2.9 05/10/2022 09:45 AM    AGRATIO 1.5 05/10/2022 09:45 AM       Lab Results   Component Value Date/Time    TSHULTRASEN 0.070 (L) 04/06/2021 0840     Lab Results   Component Value Date/Time    FREET4 1.73 (H) 04/06/2021 0840     Lab Results   Component Value Date/Time    FREET3 3.37 01/05/2021 0836     No results found for: THYSTIMIG      Imaging:      ASSESSMENT/PLAN:     1. Postsurgical hypothyroidism  Controlled  Continue levothyroxine 88 MCG daily  Repeat TSH levels in 6 months    2. Papillary carcinoma of thyroid  (HCC)  Stable  Biochemically indeterminate response to therapy noted  Previous structural abnormality seen on ultrasound are not seen on CT  Continue monitoring   repeat quantitative thyroglobulin every 6 months  Get neck ultrasound in 12 to 24 months    3. Postsurgical hypoparathyroidism (HCC)  Uncontrolled  Most likely patient has postsurgical hypoparathyroidism that is now being uncovered for unclear reasons  Start calcitriol 0.25 mcg twice a day  Reduce calcium supplementation to 600 mg twice a day of calcium carbonate  Repeat calcium levels in 2 weeks   Will update patient and make modifications to treatment if medically necessary      4. Vitamin D deficiency  Controlled  This is not the etiology of his hypercalcemia as clearly his PTH levels are inappropriately low and vitamin D levels are adequate  Continue current supplements    5. Mass of left parotid gland  Stable  Continue follow-up with [facility], Dr. Chevalier for operative management of the unknown left parotid mass      Return in about 6 months (around 12/28/2022).      This patient during there office visit today was started on a new medication.  Side effects of the new medication were discussed with the patient today in the office.     Thank you kindly for allowing me to participate in the thyroid care plan for this patient.    Erlin Crane MD, [facility], [facility]  10/11/21    CC:   Rebekah Calix M.D.

## 2022-06-30 DIAGNOSIS — C73 PAPILLARY CARCINOMA OF THYROID (HCC): ICD-10-CM

## 2022-06-30 DIAGNOSIS — E89.0 POSTSURGICAL HYPOTHYROIDISM: ICD-10-CM

## 2022-07-12 ENCOUNTER — HOSPITAL ENCOUNTER (OUTPATIENT)
Facility: MEDICAL CENTER | Age: 57
End: 2022-07-12
Attending: FAMILY MEDICINE
Payer: COMMERCIAL

## 2022-07-12 ENCOUNTER — OFFICE VISIT (OUTPATIENT)
Dept: MEDICAL GROUP | Facility: PHYSICIAN GROUP | Age: 57
End: 2022-07-12
Payer: COMMERCIAL

## 2022-07-12 VITALS
TEMPERATURE: 97.8 F | HEIGHT: 66 IN | SYSTOLIC BLOOD PRESSURE: 110 MMHG | HEART RATE: 74 BPM | DIASTOLIC BLOOD PRESSURE: 70 MMHG | BODY MASS INDEX: 27.74 KG/M2 | WEIGHT: 172.62 LBS | OXYGEN SATURATION: 97 %

## 2022-07-12 DIAGNOSIS — I10 ESSENTIAL HYPERTENSION: ICD-10-CM

## 2022-07-12 DIAGNOSIS — G47.00 INSOMNIA, UNSPECIFIED TYPE: ICD-10-CM

## 2022-07-12 DIAGNOSIS — E78.5 DYSLIPIDEMIA: ICD-10-CM

## 2022-07-12 DIAGNOSIS — F13.20 SEDATIVE DEPENDENCE (HCC): ICD-10-CM

## 2022-07-12 DIAGNOSIS — R20.2 PARESTHESIA OF ARM: ICD-10-CM

## 2022-07-12 PROCEDURE — 99214 OFFICE O/P EST MOD 30 MIN: CPT | Performed by: FAMILY MEDICINE

## 2022-07-12 PROCEDURE — 99000 SPECIMEN HANDLING OFFICE-LAB: CPT | Performed by: FAMILY MEDICINE

## 2022-07-12 PROCEDURE — G0481 DRUG TEST DEF 8-14 CLASSES: HCPCS

## 2022-07-12 RX ORDER — ATORVASTATIN CALCIUM 20 MG/1
20 TABLET, FILM COATED ORAL
Qty: 90 TABLET | Refills: 1 | Status: SHIPPED | OUTPATIENT
Start: 2022-07-12 | End: 2022-11-23 | Stop reason: SDUPTHER

## 2022-07-12 RX ORDER — ZOLPIDEM TARTRATE 10 MG/1
10 TABLET ORAL
Qty: 30 TABLET | Refills: 2 | Status: SHIPPED | OUTPATIENT
Start: 2022-07-12 | End: 2022-08-18 | Stop reason: SDUPTHER

## 2022-07-12 RX ORDER — MELOXICAM 15 MG/1
15 TABLET ORAL DAILY
Qty: 90 TABLET | Refills: 0 | Status: SHIPPED | OUTPATIENT
Start: 2022-07-12 | End: 2022-10-12 | Stop reason: SDUPTHER

## 2022-07-12 ASSESSMENT — FIBROSIS 4 INDEX: FIB4 SCORE: 1.61

## 2022-07-12 NOTE — PROGRESS NOTES
Subjective     Don Olivas Jr. is a 56 y.o. male who presents with Hypertension and Medication Refill (Mobic, ambien, atorvastatin)            HPI     Patient returns for follow-up.    I added amlodipine a month ago to his losartan to get his blood pressure under control.  His blood pressure is now down to goal.  He said home blood pressures are also running good and under control but denies any side effects.    I put him on gabapentin to manage paresthesia of the left upper extremity.  He said he only took it for a few days and did not feel it worked.  His endocrinologist Dr. Brock put him on calcitriol to manage his low calcium level due to post op hypoparathyroidism from previous thyroid surgery.  He said since he has been on the calcitriol the left upper extremity tingling has improved and resolved.  He said the pain that he had in his left shoulder also resolved.    He needs refill of his zolpidem.  He takes this only as needed and not every night.  The last refill was the beginning of June.  His last urine drug screen was in September 2020 and zolpidem was not detected most likely because he has not taken it for a few days prior to the collection.  He could not remember the last time he took the zolpidem.    He also needs refill of atorvastatin.  The last lipid panel came back all at [facility] in February 2022.    He also needs refill of meloxicam that he takes as needed for his back pain.  We have already done x-ray in 2016 that showed no compression deformity or acute fracture.  There is moderate degenerative disc disease and facet arthropathy.    Past medical history, past surgical history, family history reviewed-no changes    Social history reviewed-no changes    Allergies reviewed-no changes    Medications reviewed-no changes    ROS     As per HPI, the rest are negative.           Objective     /70 (BP Location: Left arm, Patient Position: Sitting, BP Cuff Size: Adult)   Pulse 74    "Temp 36.6 °C (97.8 °F) (Temporal)   Ht 1.676 m (5' 6\")   Wt 78.3 kg (172 lb 9.9 oz)   SpO2 97%   BMI 27.86 kg/m²      Physical Exam     Examined alert, awake, oriented, not in distress    Neck-supple, no lymphadenopathy, no thyromegaly  Lungs-clear to auscultation, no rales, no wheezes  Heart-regular rate and rhythm, no murmur  Extremities-no edema, clubbing, cyanosis                        Assessment & Plan        1. Essential hypertension  This is now under control with addition of amlodipine.  Continue amlodipine and losartan.    2. Paresthesia of arm  This has resolved when he started taking calcitriol.  Paresthesia was probably related to hypocalcemia.  Hypocalcemia is managed by his endocrinologist and he has blood work ordered to check the level.  We will follow along.    3. Insomnia, unspecified type  He takes zolpidem as needed and not every night.  We updated the controlled substance treatment agreement today and urine drug screen.  I sent refill electronically to the pharmacy for 30-day supply with 2 additional refills.  Since I am relocating to [place], he will need to establish care with another PCP and it may take him a while to establish with another 1 so I gave him 3 months worth of refills.  - zolpidem (AMBIEN) 10 MG Tab; Take 1 Tablet by mouth at bedtime as needed for Sleep for up to 30 days. For up to 30 days  Dispense: 30 Tablet; Refill: 2  - Pain Management Screen; Future  - Controlled Substance Treatment Agreement    4. Sedative dependence (HCC)  Same as #3.  - Pain Management Screen; Future  - Controlled Substance Treatment Agreement    5. Dyslipidemia  I refilled his atorvastatin.  He will need updated blood work around February of next year.  - atorvastatin (LIPITOR) 20 MG Tab; Take 1 Tablet by mouth every day.  Dispense: 90 Tablet; Refill: 1    He will establish care with another PCP.      Please note that this dictation was created using voice recognition software. I have worked " with consultants from the vendor as well as technical experts from [facility] to optimize the interface. I have made every reasonable attempt to correct obvious errors, but I expect that there are errors of grammar and possibly content I did not discover before finalizing the note.

## 2022-07-16 LAB
1OH-MIDAZOLAM UR QL SCN: NOT DETECTED
6MAM UR QL: NOT DETECTED
7AMINOCLONAZEPAM UR QL: NOT DETECTED
A-OH ALPRAZ UR QL: NOT DETECTED
ALPRAZ UR QL: NOT DETECTED
AMPHET UR QL SCN: NOT DETECTED
ANNOTATION COMMENT IMP: NORMAL
ANNOTATION COMMENT IMP: NORMAL
BARBITURATES UR QL: NOT DETECTED
BUPRENORPHINE UR QL: NOT DETECTED
BZE UR QL: NOT DETECTED
CARBOXYTHC UR QL: NOT DETECTED
CARISOPRODOL UR QL: NOT DETECTED
CLONAZEPAM UR QL: NOT DETECTED
CODEINE UR QL: NOT DETECTED
DIAZEPAM UR QL: NOT DETECTED
ETHYL GLUCURONIDE UR QL: NOT DETECTED
FENTANYL UR QL: NOT DETECTED
GABAPENTIN UR QL: NOT DETECTED
HYDROCODONE UR QL: NOT DETECTED
HYDROMORPHONE UR QL: NOT DETECTED
LORAZEPAM UR QL: NOT DETECTED
MDA UR QL: NOT DETECTED
MDEA UR QL: NOT DETECTED
MDMA UR QL: NOT DETECTED
MEPERIDINE UR QL: NOT DETECTED
METHADONE UR QL: NOT DETECTED
METHAMPHET UR QL: NOT DETECTED
MIDAZOLAM UR QL SCN: NOT DETECTED
MORPHINE UR QL: NOT DETECTED
NALOXONE UR QL SCN: NOT DETECTED
NORBUPRENORPHINE UR QL CFM: NOT DETECTED
NORDIAZEPAM UR QL: NOT DETECTED
NORFENTANYL UR QL: NOT DETECTED
NORHYDROCODONE UR QL CFM: NOT DETECTED
NOROXYCODONE UR QL CFM: NOT DETECTED
NOROXYMORPH CO100 Q0458: NOT DETECTED
OXAZEPAM UR QL: NOT DETECTED
OXYCODONE UR QL: NOT DETECTED
OXYMORPHONE UR QL: NOT DETECTED
PATHOLOGY STUDY: NORMAL
PCP UR QL: NOT DETECTED
PHENTERMINE UR QL: NOT DETECTED
PPAA UR QL: NOT DETECTED
PREGABALIN UR QL SCN: NOT DETECTED
SERVICE CMNT-IMP: NORMAL
TAPENADOL OSULF CO200 Q0473: NOT DETECTED
TAPENTADOL UR QL SCN: NOT DETECTED
TEMAZEPAM UR QL: NOT DETECTED
TRAMADOL UR QL: NOT DETECTED
ZOLPIDEM PHENYL-4-CARB UR QL SCN: NOT DETECTED
ZOLPIDEM UR QL: NOT DETECTED

## 2022-08-04 ENCOUNTER — TELEPHONE (OUTPATIENT)
Dept: MEDICAL GROUP | Facility: PHYSICIAN GROUP | Age: 57
End: 2022-08-04
Payer: COMMERCIAL

## 2022-08-05 NOTE — TELEPHONE ENCOUNTER
Spoke with patient regarding meloxicam.  He cannot take it because of his chronic kidney disease.  He said he already stopped it and he is only taking Tylenol as needed.  He can use over-the-counter pain patches such as Salonpas or lidocaine patch with the Tylenol.  Patient voiced understanding.

## 2022-10-12 RX ORDER — MELOXICAM 15 MG/1
15 TABLET ORAL DAILY
Qty: 90 TABLET | Refills: 0 | Status: SHIPPED | OUTPATIENT
Start: 2022-10-12 | End: 2022-10-26

## 2022-10-14 ENCOUNTER — OFFICE VISIT (OUTPATIENT)
Dept: URGENT CARE | Facility: PHYSICIAN GROUP | Age: 57
End: 2022-10-14
Payer: COMMERCIAL

## 2022-10-14 DIAGNOSIS — K04.7 DENTAL ABSCESS: ICD-10-CM

## 2022-10-14 PROCEDURE — 99213 OFFICE O/P EST LOW 20 MIN: CPT | Performed by: PHYSICIAN ASSISTANT

## 2022-10-14 RX ORDER — AMOXICILLIN 500 MG/1
500 CAPSULE ORAL 3 TIMES DAILY
Qty: 21 CAPSULE | Refills: 0 | Status: SHIPPED | OUTPATIENT
Start: 2022-10-14 | End: 2022-10-21

## 2022-10-14 ASSESSMENT — ENCOUNTER SYMPTOMS
CHILLS: 0
HEADACHES: 0
SHORTNESS OF BREATH: 0
DIZZINESS: 0
VOMITING: 0
NECK PAIN: 1
FEVER: 0
SORE THROAT: 0
NAUSEA: 0

## 2022-10-14 ASSESSMENT — FIBROSIS 4 INDEX: FIB4 SCORE: 1.61

## 2022-10-14 NOTE — PROGRESS NOTES
Subjective     Don Olivas Jr. is a 56 y.o. male who presents with Oral Swelling (Upper lip swelling, pt states he ate some crab on Sunday and ever since then he has had the lip swelling X 5 days)      HPI:  Don Olivas Jr. is a 56 y.o. male who presents today for evaluation of lip swelling.  Patient reports that 5 days ago he started to develop some swelling and pain in his upper lip.  It is more to the left of midline.  He notes that this morning he took some Tylenol and aspirin but it did not help very much.  That was the only time he has tried anything to help with his symptoms.  He notes that prior to the onset of the lip swelling he had been eating crab but is not sure if it is an allergic response.  He has never had an allergic response to crab or shellfish in the past.  He has no sore throat, fever/chills, shortness of breath, or rash.  He notes that during this time the back of his neck is also felt a little bit sore.        Review of Systems   Constitutional:  Negative for chills and fever.   HENT:  Negative for sore throat.         Lip pain/swelling   Respiratory:  Negative for shortness of breath.    Gastrointestinal:  Negative for nausea and vomiting.   Musculoskeletal:  Positive for neck pain.   Skin:  Negative for itching and rash.   Neurological:  Negative for dizziness and headaches.     PMH:  has a past medical history of HTN (hypertension), Hypothyroidism, Insomnia, Mass of parotid gland (7/15), Papillary carcinoma of thyroid (HCC), Positive urine drug screen (03/2018), and Sedative hypnotic or anxiolytic dependence ([facility]).  MEDS:   Current Outpatient Medications:     amoxicillin (AMOXIL) 500 MG Cap, Take 1 Capsule by mouth 3 times a day for 7 days., Disp: 21 Capsule, Rfl: 0    meloxicam (MOBIC) 15 MG tablet, Take 1 Tablet by mouth every day., Disp: 90 Tablet, Rfl: 0    atorvastatin (LIPITOR) 20 MG Tab, Take 1 Tablet by mouth every day., Disp: 90 Tablet, Rfl: 1    calcitRIOL  "(ROCALTROL) 0.25 MCG Cap, Take 1 Capsule by mouth 2 times a day., Disp: 180 Capsule, Rfl: 2    levothyroxine (SYNTHROID) 88 MCG Tab, Take 1 Tablet by mouth every morning on an empty stomach., Disp: 90 Tablet, Rfl: 2    amLODIPine (NORVASC) 5 MG Tab, Take 1 Tablet by mouth every day., Disp: 90 Tablet, Rfl: 1    losartan (COZAAR) 100 MG Tab, TAKE 1 TABLET BY MOUTH EVERY DAY, Disp: 90 Tablet, Rfl: 1    MAGNESIUM PO, Take  by mouth., Disp: , Rfl:     Aug Betamethasone Dipropionate (DIPROLENE-AF) 0.05 % Cream, APPLY A THIN LAYER TO THE AFFECTED AREA TWICE DAILY, Disp: 30 g, Rfl: 1    Vitamin D, Ergocalciferol, 2000 units Cap, Take  by mouth., Disp: , Rfl:     Calcium Carb-Cholecalciferol (CALCIUM 600 + D PO), Take  by mouth 2 Times a Day., Disp: , Rfl:     gabapentin (NEURONTIN) 300 MG Cap, Take 1 Capsule by mouth 3 times a day. (Patient not taking: No sig reported), Disp: 30 Capsule, Rfl: 2  ALLERGIES:   Allergies   Allergen Reactions    Lisinopril      Cough     SURGHX:   Past Surgical History:   Procedure Laterality Date    COLONOSCOPY  05/08/2018    mild divertoculosis sigmoid colon, int hemorrhoids    THYROIDECTOMY TOTAL  2007    due to thyroid cancer     SOCHX:  reports that he has been smoking cigarettes. He has a 12.30 pack-year smoking history. He has never used smokeless tobacco. He reports that he does not drink alcohol and does not use drugs.  FH: Family history was reviewed, no pertinent findings to report      Objective     BP (P) 130/80 (BP Location: Right arm, Patient Position: Sitting, BP Cuff Size: Adult)   Pulse (P) 66   Temp (P) 36.4 °C (97.5 °F) (Temporal)   Resp (P) 14   Ht (P) 1.676 m (5' 6\")   Wt (P) 78 kg (172 lb)   SpO2 (P) 99%   BMI (P) 27.76 kg/m²      Physical Exam  Constitutional:       General: He is not in acute distress.     Appearance: He is not diaphoretic.   HENT:      Head: Normocephalic and atraumatic.      Right Ear: External ear normal.      Left Ear: External ear normal.    "   Mouth/Throat:      Lips: Pink.      Dentition: Abnormal dentition. Dental tenderness, gingival swelling, dental caries and dental abscesses present.      Pharynx: Oropharynx is clear.     Eyes:      Conjunctiva/sclera: Conjunctivae normal.      Pupils: Pupils are equal, round, and reactive to light.   Pulmonary:      Effort: Pulmonary effort is normal. No respiratory distress.   Musculoskeletal:      Cervical back: Full passive range of motion without pain and normal range of motion.   Skin:     Findings: No rash.   Neurological:      Mental Status: He is alert and oriented to person, place, and time.   Psychiatric:         Mood and Affect: Mood and affect normal.         Cognition and Memory: Memory normal.         Judgment: Judgment normal.         Assessment & Plan       1. Dental abscess  - amoxicillin (AMOXIL) 500 MG Cap; Take 1 Capsule by mouth 3 times a day for 7 days.  Dispense: 21 Capsule; Refill: 0  Dental abscess noted on exam.  No current discharge.  Vital signs stable.  Patient will be started on amoxicillin 3 times a day for the next 7 days.  Also recommend that he rinse his mouth with warm salt water multiple times per day.  He should brush his teeth with a soft bristle toothbrush.  OTC analgesics as needed for pain.  He can apply ice to the outside of his lip to help with swelling.  He will need to follow-up with his dentist for more definitive management.            Differential Diagnosis, natural history, and supportive care discussed. Return to the Urgent Care or follow up with your PCP if symptoms fail to resolve, or for any new or worsening symptoms. Emergency room precautions discussed. Patient and/or family appears understanding of information.

## 2022-10-14 NOTE — LETTER
October 14, 2022         Patient: Don Olivas .   YOB: 1965   Date of Visit: 10/14/2022           To Whom it May Concern:    Don Olivas was seen in my clinic on 10/14/2022.       Sincerely,           Maya Ledezma P.A.-C.  Electronically Signed

## 2022-10-23 SDOH — ECONOMIC STABILITY: HOUSING INSECURITY: IN THE LAST 12 MONTHS, HOW MANY PLACES HAVE YOU LIVED?: 1

## 2022-10-23 SDOH — HEALTH STABILITY: PHYSICAL HEALTH: ON AVERAGE, HOW MANY DAYS PER WEEK DO YOU ENGAGE IN MODERATE TO STRENUOUS EXERCISE (LIKE A BRISK WALK)?: 5 DAYS

## 2022-10-23 SDOH — ECONOMIC STABILITY: TRANSPORTATION INSECURITY
IN THE PAST 12 MONTHS, HAS LACK OF RELIABLE TRANSPORTATION KEPT YOU FROM MEDICAL APPOINTMENTS, MEETINGS, WORK OR FROM GETTING THINGS NEEDED FOR DAILY LIVING?: NO

## 2022-10-23 SDOH — ECONOMIC STABILITY: INCOME INSECURITY: IN THE LAST 12 MONTHS, WAS THERE A TIME WHEN YOU WERE NOT ABLE TO PAY THE MORTGAGE OR RENT ON TIME?: NO

## 2022-10-23 SDOH — ECONOMIC STABILITY: TRANSPORTATION INSECURITY
IN THE PAST 12 MONTHS, HAS THE LACK OF TRANSPORTATION KEPT YOU FROM MEDICAL APPOINTMENTS OR FROM GETTING MEDICATIONS?: NO

## 2022-10-23 SDOH — ECONOMIC STABILITY: FOOD INSECURITY: WITHIN THE PAST 12 MONTHS, THE FOOD YOU BOUGHT JUST DIDN'T LAST AND YOU DIDN'T HAVE MONEY TO GET MORE.: NEVER TRUE

## 2022-10-23 SDOH — ECONOMIC STABILITY: HOUSING INSECURITY
IN THE LAST 12 MONTHS, WAS THERE A TIME WHEN YOU DID NOT HAVE A STEADY PLACE TO SLEEP OR SLEPT IN A SHELTER (INCLUDING NOW)?: NO

## 2022-10-23 SDOH — ECONOMIC STABILITY: FOOD INSECURITY: WITHIN THE PAST 12 MONTHS, YOU WORRIED THAT YOUR FOOD WOULD RUN OUT BEFORE YOU GOT MONEY TO BUY MORE.: NEVER TRUE

## 2022-10-23 SDOH — ECONOMIC STABILITY: TRANSPORTATION INSECURITY
IN THE PAST 12 MONTHS, HAS LACK OF TRANSPORTATION KEPT YOU FROM MEETINGS, WORK, OR FROM GETTING THINGS NEEDED FOR DAILY LIVING?: NO

## 2022-10-23 SDOH — ECONOMIC STABILITY: INCOME INSECURITY: HOW HARD IS IT FOR YOU TO PAY FOR THE VERY BASICS LIKE FOOD, HOUSING, MEDICAL CARE, AND HEATING?: NOT HARD AT ALL

## 2022-10-23 SDOH — HEALTH STABILITY: PHYSICAL HEALTH: ON AVERAGE, HOW MANY MINUTES DO YOU ENGAGE IN EXERCISE AT THIS LEVEL?: 10 MIN

## 2022-10-23 SDOH — HEALTH STABILITY: MENTAL HEALTH
STRESS IS WHEN SOMEONE FEELS TENSE, NERVOUS, ANXIOUS, OR CAN'T SLEEP AT NIGHT BECAUSE THEIR MIND IS TROUBLED. HOW STRESSED ARE YOU?: NOT AT ALL

## 2022-10-23 ASSESSMENT — SOCIAL DETERMINANTS OF HEALTH (SDOH)
DO YOU BELONG TO ANY CLUBS OR ORGANIZATIONS SUCH AS CHURCH GROUPS UNIONS, FRATERNAL OR ATHLETIC GROUPS, OR SCHOOL GROUPS?: NO
IN A TYPICAL WEEK, HOW MANY TIMES DO YOU TALK ON THE PHONE WITH FAMILY, FRIENDS, OR NEIGHBORS?: MORE THAN THREE TIMES A WEEK
HOW MANY DRINKS CONTAINING ALCOHOL DO YOU HAVE ON A TYPICAL DAY WHEN YOU ARE DRINKING: PATIENT DOES NOT DRINK
HOW OFTEN DO YOU HAVE SIX OR MORE DRINKS ON ONE OCCASION: NEVER
IN A TYPICAL WEEK, HOW MANY TIMES DO YOU TALK ON THE PHONE WITH FAMILY, FRIENDS, OR NEIGHBORS?: MORE THAN THREE TIMES A WEEK
WITHIN THE PAST 12 MONTHS, YOU WORRIED THAT YOUR FOOD WOULD RUN OUT BEFORE YOU GOT THE MONEY TO BUY MORE: NEVER TRUE
HOW OFTEN DO YOU ATTEND CHURCH OR RELIGIOUS SERVICES?: 1 TO 4 TIMES PER YEAR
HOW OFTEN DO YOU ATTENT MEETINGS OF THE CLUB OR ORGANIZATION YOU BELONG TO?: NEVER
HOW OFTEN DO YOU GET TOGETHER WITH FRIENDS OR RELATIVES?: ONCE A WEEK
DO YOU BELONG TO ANY CLUBS OR ORGANIZATIONS SUCH AS CHURCH GROUPS UNIONS, FRATERNAL OR ATHLETIC GROUPS, OR SCHOOL GROUPS?: NO
HOW OFTEN DO YOU ATTEND CHURCH OR RELIGIOUS SERVICES?: 1 TO 4 TIMES PER YEAR
HOW HARD IS IT FOR YOU TO PAY FOR THE VERY BASICS LIKE FOOD, HOUSING, MEDICAL CARE, AND HEATING?: NOT HARD AT ALL
HOW OFTEN DO YOU HAVE A DRINK CONTAINING ALCOHOL: NEVER
HOW OFTEN DO YOU GET TOGETHER WITH FRIENDS OR RELATIVES?: ONCE A WEEK
HOW OFTEN DO YOU ATTENT MEETINGS OF THE CLUB OR ORGANIZATION YOU BELONG TO?: NEVER

## 2022-10-23 ASSESSMENT — LIFESTYLE VARIABLES
HOW OFTEN DO YOU HAVE A DRINK CONTAINING ALCOHOL: NEVER
HOW OFTEN DO YOU HAVE SIX OR MORE DRINKS ON ONE OCCASION: NEVER
SKIP TO QUESTIONS 9-10: 1
AUDIT-C TOTAL SCORE: 0
HOW MANY STANDARD DRINKS CONTAINING ALCOHOL DO YOU HAVE ON A TYPICAL DAY: PATIENT DOES NOT DRINK

## 2022-10-26 ENCOUNTER — HOSPITAL ENCOUNTER (OUTPATIENT)
Dept: LAB | Facility: MEDICAL CENTER | Age: 57
End: 2022-10-26
Attending: INTERNAL MEDICINE
Payer: COMMERCIAL

## 2022-10-26 ENCOUNTER — OFFICE VISIT (OUTPATIENT)
Dept: MEDICAL GROUP | Facility: PHYSICIAN GROUP | Age: 57
End: 2022-10-26
Payer: COMMERCIAL

## 2022-10-26 VITALS
HEIGHT: 66 IN | DIASTOLIC BLOOD PRESSURE: 74 MMHG | OXYGEN SATURATION: 99 % | TEMPERATURE: 98.1 F | SYSTOLIC BLOOD PRESSURE: 126 MMHG | HEART RATE: 78 BPM | BODY MASS INDEX: 27.97 KG/M2 | RESPIRATION RATE: 16 BRPM | WEIGHT: 174 LBS

## 2022-10-26 DIAGNOSIS — I10 PRIMARY HYPERTENSION: ICD-10-CM

## 2022-10-26 DIAGNOSIS — E89.0 POSTSURGICAL HYPOTHYROIDISM: ICD-10-CM

## 2022-10-26 DIAGNOSIS — Z76.89 ENCOUNTER TO ESTABLISH CARE: ICD-10-CM

## 2022-10-26 DIAGNOSIS — C73 PAPILLARY CARCINOMA OF THYROID (HCC): ICD-10-CM

## 2022-10-26 DIAGNOSIS — Z23 NEED FOR VACCINATION: ICD-10-CM

## 2022-10-26 DIAGNOSIS — G47.00 INSOMNIA, UNSPECIFIED TYPE: ICD-10-CM

## 2022-10-26 DIAGNOSIS — M65.342 TRIGGER RING FINGER OF LEFT HAND: ICD-10-CM

## 2022-10-26 DIAGNOSIS — E89.2 POSTSURGICAL HYPOPARATHYROIDISM (HCC): ICD-10-CM

## 2022-10-26 PROBLEM — M25.561 RIGHT KNEE PAIN: Status: RESOLVED | Noted: 2020-09-21 | Resolved: 2022-10-26

## 2022-10-26 PROBLEM — S46.812A STRAIN OF LEFT TRAPEZIUS MUSCLE: Status: RESOLVED | Noted: 2022-05-10 | Resolved: 2022-10-26

## 2022-10-26 LAB
ALBUMIN SERPL BCP-MCNC: 4.3 G/DL (ref 3.2–4.9)
ALBUMIN/GLOB SERPL: 1.6 G/DL
ALP SERPL-CCNC: 80 U/L (ref 30–99)
ALT SERPL-CCNC: 32 U/L (ref 2–50)
ANION GAP SERPL CALC-SCNC: 10 MMOL/L (ref 7–16)
AST SERPL-CCNC: 25 U/L (ref 12–45)
BILIRUB SERPL-MCNC: 0.5 MG/DL (ref 0.1–1.5)
BUN SERPL-MCNC: 18 MG/DL (ref 8–22)
CALCIUM SERPL-MCNC: 9 MG/DL (ref 8.5–10.5)
CHLORIDE SERPL-SCNC: 105 MMOL/L (ref 96–112)
CO2 SERPL-SCNC: 27 MMOL/L (ref 20–33)
CREAT SERPL-MCNC: 1.3 MG/DL (ref 0.5–1.4)
FASTING STATUS PATIENT QL REPORTED: NORMAL
GFR SERPLBLD CREATININE-BSD FMLA CKD-EPI: 64 ML/MIN/1.73 M 2
GLOBULIN SER CALC-MCNC: 2.7 G/DL (ref 1.9–3.5)
GLUCOSE SERPL-MCNC: 96 MG/DL (ref 65–99)
POTASSIUM SERPL-SCNC: 4.3 MMOL/L (ref 3.6–5.5)
PROT SERPL-MCNC: 7 G/DL (ref 6–8.2)
SODIUM SERPL-SCNC: 142 MMOL/L (ref 135–145)

## 2022-10-26 PROCEDURE — 99214 OFFICE O/P EST MOD 30 MIN: CPT | Mod: 25 | Performed by: FAMILY MEDICINE

## 2022-10-26 PROCEDURE — 90686 IIV4 VACC NO PRSV 0.5 ML IM: CPT | Performed by: FAMILY MEDICINE

## 2022-10-26 PROCEDURE — 36415 COLL VENOUS BLD VENIPUNCTURE: CPT

## 2022-10-26 PROCEDURE — 90471 IMMUNIZATION ADMIN: CPT | Performed by: FAMILY MEDICINE

## 2022-10-26 PROCEDURE — 80053 COMPREHEN METABOLIC PANEL: CPT

## 2022-10-26 RX ORDER — ZOLPIDEM TARTRATE 10 MG/1
10 TABLET ORAL NIGHTLY PRN
COMMUNITY
End: 2022-10-26 | Stop reason: SDUPTHER

## 2022-10-26 RX ORDER — BETAMETHASONE DIPROPIONATE 0.5 MG/G
CREAM TOPICAL
Qty: 30 G | Refills: 3 | Status: SHIPPED | OUTPATIENT
Start: 2022-10-26 | End: 2024-03-01

## 2022-10-26 RX ORDER — ZOLPIDEM TARTRATE 10 MG/1
10 TABLET ORAL NIGHTLY PRN
Qty: 30 TABLET | Refills: 0 | Status: SHIPPED | OUTPATIENT
Start: 2022-10-26 | End: 2023-02-22

## 2022-10-26 ASSESSMENT — FIBROSIS 4 INDEX: FIB4 SCORE: 1.61

## 2022-10-26 NOTE — ASSESSMENT & PLAN NOTE
This is a chronic problem.  Patient is asking for refill on his Ambien.  States he uses it sparingly when he just cannot get to sleep.  He has not tried anything else other than the Ambien.

## 2022-10-26 NOTE — ASSESSMENT & PLAN NOTE
Patient is here to establish care.  He does see Dr. Brock for his endocrine issues.  He states he is feeling very well and having no current problems.  His physical exam and labs are current.  Looks like his annual labs for cholesterol and PSA will be due somewhere around mid February.

## 2022-10-26 NOTE — PROGRESS NOTES
Subjective:     CC: Patient is here today for several issues and to establish care.    HPI:   Don presents today with the following medical concerns:    Trigger ring finger of left hand  This is a recurrent problem.  Patient states that he had this about 4 years ago and received a steroid injection that worked until now.  He would like to be referred to get another one done.    Insomnia  This is a chronic problem.  Patient is asking for refill on his Ambien.  States he uses it sparingly when he just cannot get to sleep.  He has not tried anything else other than the Ambien.    Encounter to establish care  Patient is here to establish care.  He does see Dr. Brock for his endocrine issues.  He states he is feeling very well and having no current problems.  His physical exam and labs are current.  Looks like his annual labs for cholesterol and PSA will be due somewhere around mid February.    HTN (hypertension)  This is a chronic problem under good control.    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    biopsy-benign    Papillary carcinoma of thyroid (HCC)     Positive urine drug screen 03/2018    positive THC    Sedative hypnotic or anxiolytic dependence (HCC)        Social History     Tobacco Use    Smoking status: Every Day     Packs/day: 0.30     Years: 41.00     Pack years: 12.30     Types: Cigarettes    Smokeless tobacco: Never   Vaping Use    Vaping Use: Never used   Substance Use Topics    Alcohol use: No     Comment: none in 12 yrs    Drug use: No       Current Outpatient Medications Ordered in Epic   Medication Sig Dispense Refill    Aug Betamethasone Dipropionate (DIPROLENE-AF) 0.05 % Cream APPLY A THIN LAYER TO THE AFFECTED AREA TWICE DAILY 30 g 3    zolpidem (AMBIEN) 10 MG Tab Take 1 Tablet by mouth at bedtime as needed for Sleep for up to  "30 days. 30 Tablet 0    atorvastatin (LIPITOR) 20 MG Tab Take 1 Tablet by mouth every day. 90 Tablet 1    calcitRIOL (ROCALTROL) 0.25 MCG Cap Take 1 Capsule by mouth 2 times a day. 180 Capsule 2    amLODIPine (NORVASC) 5 MG Tab Take 1 Tablet by mouth every day. 90 Tablet 1    losartan (COZAAR) 100 MG Tab TAKE 1 TABLET BY MOUTH EVERY DAY 90 Tablet 1    MAGNESIUM PO Take  by mouth.      Vitamin D, Ergocalciferol, 2000 units Cap Take  by mouth.      Calcium Carb-Cholecalciferol (CALCIUM 600 + D PO) Take  by mouth 2 Times a Day.       No current [facility]-ordered facility-administered medications on file.       Allergies:  Lisinopril    Health Maintenance: Completed    ROS:  Gen: no fevers/chills, no changes in weight  Eyes: no changes in vision  ENT: no sore throat, no hearing loss, no bloody nose  Pulm: no sob, no cough  CV: no chest pain, no palpitations  GI: no nausea/vomiting, no diarrhea  : no dysuria  MSk: no myalgias  Skin: no rash  Neuro: no headaches, no numbness/tingling  Heme/Lymph: no easy bruising      Objective:       Exam:  /74 (BP Location: Left arm, Patient Position: Sitting, BP Cuff Size: Adult)   Pulse 78   Temp 36.7 °C (98.1 °F) (Temporal)   Resp 16   Ht 1.676 m (5' 6\")   Wt 78.9 kg (174 lb)   SpO2 99%   BMI 28.08 kg/m²  Body mass index is 28.08 kg/m².    Gen: Alert and oriented, No apparent distress.  Ext: No clubbing, cyanosis, edema.  Patient cannot completely flex his left ring finger due to discomfort.  No swelling is noted.      Labs: Reviewed    Assessment & Plan:     56 y.o. male with the following -     1. Need for vaccination  Flu vaccine given today.  He declined pneumonia vaccine.  He was reminded to get his second shingles vaccine at his pharmacy.  - INFLUENZA VACCINE QUAD INJ (PF)    2. Trigger ring finger of left hand  This is a recurrent problem.  Referral to orthopedics made but he may just go to the [facility] clinic today.  - Referral to Orthopedics    3. Insomnia, " unspecified type  This is a chronic problem.  I discussed alternative medications to Ambien.  For now we will renew it but if he finds he needs it every day we need to look into other options such as trazodone.  - zolpidem (AMBIEN) 10 MG Tab; Take 1 Tablet by mouth at bedtime as needed for Sleep for up to 30 days.  Dispense: 30 Tablet; Refill: 0    4. Encounter to establish care  Patient establish care with me today.  We will see him back in 6 months for an annual exam and any labs that need to be repeated.    5. Primary hypertension  This is a chronic problem under good control.  Continue to follow.      Return in about 6 months (around 4/26/2023) for Long, annual exam.    Please note that this dictation was created using voice recognition software. I have made every reasonable attempt to correct obvious errors, but I expect that there are errors of grammar and possibly content that I did not discover before finalizing the note.

## 2022-10-26 NOTE — ASSESSMENT & PLAN NOTE
This is a recurrent problem.  Patient states that he had this about 4 years ago and received a steroid injection that worked until now.  He would like to be referred to get another one done.

## 2022-11-01 RX ORDER — ALBUTEROL SULFATE 90 UG/1
1-2 AEROSOL, METERED RESPIRATORY (INHALATION) EVERY 6 HOURS PRN
Qty: 1 EACH | Refills: 2 | Status: SHIPPED | OUTPATIENT
Start: 2022-11-01 | End: 2023-08-01 | Stop reason: SDUPTHER

## 2022-11-23 DIAGNOSIS — I10 ESSENTIAL HYPERTENSION: ICD-10-CM

## 2022-11-23 DIAGNOSIS — E78.5 DYSLIPIDEMIA: ICD-10-CM

## 2022-11-23 RX ORDER — AMLODIPINE BESYLATE 5 MG/1
5 TABLET ORAL DAILY
Qty: 90 TABLET | Refills: 0 | Status: SHIPPED | OUTPATIENT
Start: 2022-11-23 | End: 2023-02-27

## 2022-11-23 RX ORDER — ATORVASTATIN CALCIUM 20 MG/1
20 TABLET, FILM COATED ORAL
Qty: 90 TABLET | Refills: 0 | Status: SHIPPED | OUTPATIENT
Start: 2022-11-23 | End: 2023-01-23 | Stop reason: SDUPTHER

## 2023-01-23 ENCOUNTER — TELEPHONE (OUTPATIENT)
Dept: ENDOCRINOLOGY | Facility: MEDICAL CENTER | Age: 58
End: 2023-01-23
Payer: COMMERCIAL

## 2023-01-23 DIAGNOSIS — E89.0 POSTOPERATIVE HYPOTHYROIDISM: ICD-10-CM

## 2023-01-23 DIAGNOSIS — E55.9 VITAMIN D DEFICIENCY: ICD-10-CM

## 2023-01-23 DIAGNOSIS — E78.5 DYSLIPIDEMIA: ICD-10-CM

## 2023-01-23 DIAGNOSIS — C73 PAPILLARY CARCINOMA OF THYROID (HCC): ICD-10-CM

## 2023-01-23 DIAGNOSIS — E89.2 POSTSURGICAL HYPOPARATHYROIDISM (HCC): ICD-10-CM

## 2023-01-23 RX ORDER — ATORVASTATIN CALCIUM 20 MG/1
20 TABLET, FILM COATED ORAL
Qty: 90 TABLET | Refills: 2 | Status: SHIPPED | OUTPATIENT
Start: 2023-01-23 | End: 2023-06-06

## 2023-01-23 RX ORDER — LEVOTHYROXINE SODIUM 88 UG/1
88 TABLET ORAL
Qty: 90 TABLET | Refills: 1 | Status: SHIPPED | OUTPATIENT
Start: 2023-01-23 | End: 2023-05-22

## 2023-01-23 RX ORDER — CALCITRIOL 0.25 UG/1
0.25 CAPSULE, LIQUID FILLED ORAL 2 TIMES DAILY
Qty: 180 CAPSULE | Refills: 2 | Status: SHIPPED | OUTPATIENT
Start: 2023-01-23 | End: 2023-07-03 | Stop reason: SDUPTHER

## 2023-02-21 DIAGNOSIS — G47.00 INSOMNIA, UNSPECIFIED TYPE: ICD-10-CM

## 2023-02-22 RX ORDER — ZOLPIDEM TARTRATE 10 MG/1
10 TABLET ORAL NIGHTLY PRN
Qty: 30 TABLET | Refills: 0 | Status: SHIPPED | OUTPATIENT
Start: 2023-02-22 | End: 2023-03-28

## 2023-02-26 DIAGNOSIS — I10 ESSENTIAL HYPERTENSION: ICD-10-CM

## 2023-02-27 RX ORDER — AMLODIPINE BESYLATE 5 MG/1
5 TABLET ORAL DAILY
Qty: 90 TABLET | Refills: 2 | Status: SHIPPED | OUTPATIENT
Start: 2023-02-27 | End: 2023-11-28

## 2023-03-27 DIAGNOSIS — G47.00 INSOMNIA, UNSPECIFIED TYPE: ICD-10-CM

## 2023-03-28 RX ORDER — ZOLPIDEM TARTRATE 10 MG/1
10 TABLET ORAL NIGHTLY PRN
Qty: 30 TABLET | Refills: 0 | Status: SHIPPED | OUTPATIENT
Start: 2023-03-28 | End: 2023-05-03

## 2023-04-21 DIAGNOSIS — I10 ESSENTIAL HYPERTENSION: ICD-10-CM

## 2023-04-22 RX ORDER — LOSARTAN POTASSIUM 100 MG/1
TABLET ORAL
Qty: 90 TABLET | Refills: 1 | Status: SHIPPED | OUTPATIENT
Start: 2023-04-22 | End: 2023-10-23

## 2023-05-02 DIAGNOSIS — G47.00 INSOMNIA, UNSPECIFIED TYPE: ICD-10-CM

## 2023-05-03 RX ORDER — ZOLPIDEM TARTRATE 10 MG/1
10 TABLET ORAL NIGHTLY PRN
Qty: 30 TABLET | Refills: 0 | Status: SHIPPED | OUTPATIENT
Start: 2023-05-03 | End: 2023-06-07

## 2023-05-20 DIAGNOSIS — E89.0 POSTOPERATIVE HYPOTHYROIDISM: ICD-10-CM

## 2023-05-22 ENCOUNTER — OFFICE VISIT (OUTPATIENT)
Dept: MEDICAL GROUP | Facility: PHYSICIAN GROUP | Age: 58
End: 2023-05-22
Payer: COMMERCIAL

## 2023-05-22 VITALS
WEIGHT: 177.2 LBS | OXYGEN SATURATION: 97 % | DIASTOLIC BLOOD PRESSURE: 72 MMHG | RESPIRATION RATE: 16 BRPM | TEMPERATURE: 97.6 F | HEIGHT: 66 IN | BODY MASS INDEX: 28.48 KG/M2 | HEART RATE: 74 BPM | SYSTOLIC BLOOD PRESSURE: 124 MMHG

## 2023-05-22 DIAGNOSIS — Z00.00 WELLNESS EXAMINATION: ICD-10-CM

## 2023-05-22 DIAGNOSIS — F51.01 PRIMARY INSOMNIA: ICD-10-CM

## 2023-05-22 DIAGNOSIS — M65.311 TRIGGER FINGER OF RIGHT THUMB: ICD-10-CM

## 2023-05-22 DIAGNOSIS — R25.2 MUSCLE CRAMP: ICD-10-CM

## 2023-05-22 PROBLEM — Z76.89 ENCOUNTER TO ESTABLISH CARE: Status: RESOLVED | Noted: 2022-10-26 | Resolved: 2023-05-22

## 2023-05-22 PROCEDURE — 99396 PREV VISIT EST AGE 40-64: CPT | Performed by: FAMILY MEDICINE

## 2023-05-22 PROCEDURE — 3074F SYST BP LT 130 MM HG: CPT | Performed by: FAMILY MEDICINE

## 2023-05-22 PROCEDURE — 3078F DIAST BP <80 MM HG: CPT | Performed by: FAMILY MEDICINE

## 2023-05-22 RX ORDER — LEVOTHYROXINE SODIUM 88 UG/1
TABLET ORAL
Qty: 90 TABLET | Refills: 1 | Status: SHIPPED | OUTPATIENT
Start: 2023-05-22 | End: 2023-07-03 | Stop reason: SDUPTHER

## 2023-05-22 ASSESSMENT — PATIENT HEALTH QUESTIONNAIRE - PHQ9: CLINICAL INTERPRETATION OF PHQ2 SCORE: 0

## 2023-05-22 ASSESSMENT — FIBROSIS 4 INDEX: FIB4 SCORE: 1.32

## 2023-05-22 NOTE — ASSESSMENT & PLAN NOTE
This is a new recent problem.  I told patient it could be due to his atorvastatin.  I will have him stop that for 2 weeks and see if the symptoms resolved.  Then he could restart it and if the cramps restart then we need to change him to rosuvastatin.

## 2023-05-22 NOTE — ASSESSMENT & PLAN NOTE
This is a chronic problem.  Patient does use his medication sparingly.  His agreement is still good until July.

## 2023-05-22 NOTE — ASSESSMENT & PLAN NOTE
Patient is here today for his wellness exam.  States has been having some muscle cramps and like that investigated.  He does get care through Dr. Brock in the endocrinology clinic.  Otherwise he is feeling well.  He is due for some baseline labs in addition to the ones Dr. Brock's ordered.

## 2023-05-22 NOTE — ASSESSMENT & PLAN NOTE
This is a new problem.  Patient is now getting trigger finger and discomfort to the right thumb.  He has had injections on the finger on his left hand this seems to be helping.

## 2023-05-22 NOTE — PROGRESS NOTES
Subjective:     CC: Here for his wellness exam.    HPI:   Don presents today with the following medical concerns:    Wellness examination  Patient is here today for his wellness exam.  States has been having some muscle cramps and like that investigated.  He does get care through Dr. Brock in the endocrinology clinic.  Otherwise he is feeling well.  He is due for some baseline labs in addition to the ones Dr. Brock's ordered.    Muscle cramp  This is a new recent problem.  I told patient it could be due to his atorvastatin.  I will have him stop that for 2 weeks and see if the symptoms resolved.  Then he could restart it and if the cramps restart then we need to change him to rosuvastatin.    Insomnia  This is a chronic problem.  Patient does use his medication sparingly.  His agreement is still good until July.    Trigger finger of right thumb  This is a new problem.  Patient is now getting trigger finger and discomfort to the right thumb.  He has had injections on the finger on his left hand this seems to be helping.    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    biopsy-benign    Papillary carcinoma of thyroid (HCC)     Positive urine drug screen 03/2018    positive THC    Sedative hypnotic or anxiolytic dependence (HCC)        Social History     Tobacco Use    Smoking status: Every Day     Packs/day: 0.30     Years: 41.00     Pack years: 12.30     Types: Cigarettes    Smokeless tobacco: Never   Vaping Use    Vaping Use: Never used   Substance Use Topics    Alcohol use: No     Comment: none in 12 yrs    Drug use: No       Current Outpatient Medications Ordered in Epic   Medication Sig Dispense Refill    levothyroxine (SYNTHROID) 88 MCG Tab TAKE 1 TABLET BY MOUTH EVERY DAY IN THE MORNING ON AN EMPTY STOMACH 90 Tablet 1    zolpidem (AMBIEN) 10 MG  "Tab TAKE 1 TABLET BY MOUTH AT BEDTIME AS NEEDED FOR SLEEP FOR UP TO 30 DAYS. 30 Tablet 0    losartan (COZAAR) 100 MG Tab TAKE 1 TABLET BY MOUTH EVERY DAY 90 Tablet 1    amLODIPine (NORVASC) 5 MG Tab TAKE 1 TABLET BY MOUTH EVERY DAY 90 Tablet 2    atorvastatin (LIPITOR) 20 MG Tab Take 1 Tablet by mouth every day. 90 Tablet 2    calcitRIOL (ROCALTROL) 0.25 MCG Cap Take 1 Capsule by mouth 2 times a day. 180 Capsule 2    albuterol 108 (90 Base) MCG/ACT Aero Soln inhalation aerosol Inhale 1-2 Puffs every 6 hours as needed for Shortness of Breath. 1 Each 2    Aug Betamethasone Dipropionate (DIPROLENE-AF) 0.05 % Cream APPLY A THIN LAYER TO THE AFFECTED AREA TWICE DAILY 30 g 3    MAGNESIUM PO Take  by mouth.      Vitamin D, Ergocalciferol, 2000 units Cap Take  by mouth.      Calcium Carb-Cholecalciferol (CALCIUM 600 + D PO) Take  by mouth 2 Times a Day.       No current [facility]-ordered facility-administered medications on file.       Allergies:  Lisinopril    Health Maintenance: Completed    ROS:  Gen: no fevers/chills, no changes in weight  Eyes: no changes in vision  ENT: no sore throat, no hearing loss, no bloody nose  Pulm: no sob, no cough  CV: no chest pain, no palpitations  GI: no nausea/vomiting, no diarrhea  : no dysuria  Skin: no rash  Neuro: no headaches, no numbness/tingling  Heme/Lymph: no easy bruising      Objective:       Exam:  /72 (BP Location: Left arm, Patient Position: Sitting, BP Cuff Size: Adult)   Pulse 74   Temp 36.4 °C (97.6 °F) (Temporal)   Resp 16   Ht 1.676 m (5' 6\")   Wt 80.4 kg (177 lb 3.2 oz)   SpO2 97%   BMI 28.60 kg/m²  Body mass index is 28.6 kg/m².    Gen: Alert and oriented, No apparent distress.  Eyes:   Extraocular motions intact.  No scleral icterus seen.  ENT:    Ear canals and TMs are clear.  Patient has a very firm mass to the left lower angle of the jaw.  It has been biopsied and is benign.  Nontender.  Neck: Neck is supple without lymphadenopathy.  Lungs: Normal " effort, CTA bilaterally, no wheezes, rhonchi, or rales  CV: Regular rate and rhythm. No murmurs, rubs, or gallops.  No carotid bruits heard.  Abdomen: Soft, nontender, no organomegaly or masses.  Normal bowel sounds.  Ext: No clubbing, cyanosis, edema.  Patient does have catching to the right thumb with range of motion.  No swelling or redness noted.  Neuro: Cranial nerves II through VIII are grossly intact.  No lateralized signs are seen.  Gait is normal.      Labs: Ordered    Assessment & Plan:     57 y.o. male with the following -     1. Trigger finger of right thumb  This is a new problem.  Referral to [facility] made.  - Referral to Orthopedics    2. Wellness examination  Patient's wellness exam today appeared unremarkable.  Baseline labs ordered.  General health care issues addressed.  - CBC WITHOUT DIFFERENTIAL; Future  - Lipid Profile; Future  - PROSTATE SPECIFIC AG SCREENING; Future  - URINALYSIS,CULTURE IF INDICATED; Future    3. Muscle cramp  This is a new problem.  Likely due to statin.  We will see if it resolves going off the medication.  If it does not then it recurs when he restarts that we can change him to rosuvastatin.    4. Primary insomnia  This is a chronic problem.  Continue to monitor and follow.    Anticipatory guidance: Patient was talked to about general health care issues.  His medications and labs were discussed.  Return in about 1 year (around 5/22/2024) for annual exam.    Please note that this dictation was created using voice recognition software. I have made every reasonable attempt to correct obvious errors, but I expect that there are errors of grammar and possibly content that I did not discover before finalizing the note.

## 2023-05-23 ENCOUNTER — HOSPITAL ENCOUNTER (OUTPATIENT)
Dept: LAB | Facility: MEDICAL CENTER | Age: 58
End: 2023-05-23
Attending: FAMILY MEDICINE
Payer: COMMERCIAL

## 2023-05-23 DIAGNOSIS — C73 PAPILLARY CARCINOMA OF THYROID (HCC): ICD-10-CM

## 2023-05-23 DIAGNOSIS — E89.2 POSTSURGICAL HYPOPARATHYROIDISM (HCC): ICD-10-CM

## 2023-05-23 DIAGNOSIS — E55.9 VITAMIN D DEFICIENCY: ICD-10-CM

## 2023-05-23 DIAGNOSIS — Z00.00 WELLNESS EXAMINATION: ICD-10-CM

## 2023-05-23 DIAGNOSIS — E89.0 POSTOPERATIVE HYPOTHYROIDISM: ICD-10-CM

## 2023-05-23 LAB
25(OH)D3 SERPL-MCNC: 54 NG/ML (ref 30–100)
ALBUMIN SERPL BCP-MCNC: 4.4 G/DL (ref 3.2–4.9)
ALBUMIN/GLOB SERPL: 1.5 G/DL
ALP SERPL-CCNC: 69 U/L (ref 30–99)
ALT SERPL-CCNC: 28 U/L (ref 2–50)
ANION GAP SERPL CALC-SCNC: 14 MMOL/L (ref 7–16)
APPEARANCE UR: CLEAR
AST SERPL-CCNC: 37 U/L (ref 12–45)
BILIRUB SERPL-MCNC: 0.8 MG/DL (ref 0.1–1.5)
BILIRUB UR QL STRIP.AUTO: NEGATIVE
BUN SERPL-MCNC: 21 MG/DL (ref 8–22)
CALCIUM ALBUM COR SERPL-MCNC: 8.6 MG/DL (ref 8.5–10.5)
CALCIUM SERPL-MCNC: 8.9 MG/DL (ref 8.5–10.5)
CHLORIDE SERPL-SCNC: 106 MMOL/L (ref 96–112)
CHOLEST SERPL-MCNC: 154 MG/DL (ref 100–199)
CO2 SERPL-SCNC: 24 MMOL/L (ref 20–33)
COLOR UR: YELLOW
CREAT SERPL-MCNC: 1.45 MG/DL (ref 0.5–1.4)
ERYTHROCYTE [DISTWIDTH] IN BLOOD BY AUTOMATED COUNT: 42.5 FL (ref 35.9–50)
FASTING STATUS PATIENT QL REPORTED: NORMAL
FASTING STATUS PATIENT QL REPORTED: NORMAL
GFR SERPLBLD CREATININE-BSD FMLA CKD-EPI: 56 ML/MIN/1.73 M 2
GLOBULIN SER CALC-MCNC: 3 G/DL (ref 1.9–3.5)
GLUCOSE SERPL-MCNC: 92 MG/DL (ref 65–99)
GLUCOSE UR STRIP.AUTO-MCNC: NEGATIVE MG/DL
HCT VFR BLD AUTO: 47.4 % (ref 42–52)
HDLC SERPL-MCNC: 54 MG/DL
HGB BLD-MCNC: 16.3 G/DL (ref 14–18)
KETONES UR STRIP.AUTO-MCNC: NEGATIVE MG/DL
LDLC SERPL CALC-MCNC: 82 MG/DL
LEUKOCYTE ESTERASE UR QL STRIP.AUTO: NEGATIVE
MCH RBC QN AUTO: 31.7 PG (ref 27–33)
MCHC RBC AUTO-ENTMCNC: 34.4 G/DL (ref 32.3–36.5)
MCV RBC AUTO: 92.2 FL (ref 81.4–97.8)
MICRO URNS: NORMAL
NITRITE UR QL STRIP.AUTO: NEGATIVE
PH UR STRIP.AUTO: 6.5 [PH] (ref 5–8)
PHOSPHATE SERPL-MCNC: 4 MG/DL (ref 2.5–4.5)
PLATELET # BLD AUTO: 173 K/UL (ref 164–446)
PMV BLD AUTO: 10.4 FL (ref 9–12.9)
POTASSIUM SERPL-SCNC: 4.2 MMOL/L (ref 3.6–5.5)
PROT SERPL-MCNC: 7.4 G/DL (ref 6–8.2)
PROT UR QL STRIP: NEGATIVE MG/DL
PSA SERPL-MCNC: 0.99 NG/ML (ref 0–4)
PTH-INTACT SERPL-MCNC: 13.2 PG/ML (ref 14–72)
RBC # BLD AUTO: 5.14 M/UL (ref 4.7–6.1)
RBC UR QL AUTO: NEGATIVE
SODIUM SERPL-SCNC: 144 MMOL/L (ref 135–145)
SP GR UR STRIP.AUTO: 1.02
T4 FREE SERPL-MCNC: 1.65 NG/DL (ref 0.93–1.7)
TRIGL SERPL-MCNC: 88 MG/DL (ref 0–149)
TSH SERPL DL<=0.005 MIU/L-ACNC: 0.18 UIU/ML (ref 0.38–5.33)
UROBILINOGEN UR STRIP.AUTO-MCNC: 0.2 MG/DL
WBC # BLD AUTO: 6.1 K/UL (ref 4.8–10.8)

## 2023-05-23 PROCEDURE — 80061 LIPID PANEL: CPT

## 2023-05-23 PROCEDURE — 84100 ASSAY OF PHOSPHORUS: CPT

## 2023-05-23 PROCEDURE — 82306 VITAMIN D 25 HYDROXY: CPT

## 2023-05-23 PROCEDURE — 83970 ASSAY OF PARATHORMONE: CPT

## 2023-05-23 PROCEDURE — 84439 ASSAY OF FREE THYROXINE: CPT

## 2023-05-23 PROCEDURE — 81003 URINALYSIS AUTO W/O SCOPE: CPT

## 2023-05-23 PROCEDURE — 84432 ASSAY OF THYROGLOBULIN: CPT

## 2023-05-23 PROCEDURE — 36415 COLL VENOUS BLD VENIPUNCTURE: CPT

## 2023-05-23 PROCEDURE — 86800 THYROGLOBULIN ANTIBODY: CPT

## 2023-05-23 PROCEDURE — 84443 ASSAY THYROID STIM HORMONE: CPT

## 2023-05-23 PROCEDURE — 80053 COMPREHEN METABOLIC PANEL: CPT

## 2023-05-23 PROCEDURE — 85027 COMPLETE CBC AUTOMATED: CPT

## 2023-05-23 PROCEDURE — 84153 ASSAY OF PSA TOTAL: CPT

## 2023-05-24 LAB
THYROGLOB AB SERPL-ACNC: <0.9 IU/ML (ref 0–4)
THYROGLOB SERPL-MCNC: 0.4 NG/ML (ref 1.3–31.8)
THYROGLOB SERPL-MCNC: ABNORMAL NG/ML (ref 1.3–31.8)

## 2023-06-06 DIAGNOSIS — G47.00 INSOMNIA, UNSPECIFIED TYPE: ICD-10-CM

## 2023-06-06 DIAGNOSIS — E78.00 PURE HYPERCHOLESTEROLEMIA: ICD-10-CM

## 2023-06-06 RX ORDER — ROSUVASTATIN CALCIUM 20 MG/1
20 TABLET, COATED ORAL EVERY EVENING
Qty: 30 TABLET | Refills: 2 | Status: SHIPPED | OUTPATIENT
Start: 2023-06-06 | End: 2023-07-29

## 2023-06-07 RX ORDER — ZOLPIDEM TARTRATE 10 MG/1
10 TABLET ORAL NIGHTLY PRN
Qty: 30 TABLET | Refills: 0 | Status: SHIPPED | OUTPATIENT
Start: 2023-06-07 | End: 2023-07-06

## 2023-07-03 ENCOUNTER — OFFICE VISIT (OUTPATIENT)
Dept: ENDOCRINOLOGY | Facility: MEDICAL CENTER | Age: 58
End: 2023-07-03
Attending: INTERNAL MEDICINE
Payer: COMMERCIAL

## 2023-07-03 VITALS
OXYGEN SATURATION: 96 % | RESPIRATION RATE: 20 BRPM | SYSTOLIC BLOOD PRESSURE: 118 MMHG | WEIGHT: 178.7 LBS | DIASTOLIC BLOOD PRESSURE: 78 MMHG | HEART RATE: 91 BPM | HEIGHT: 66 IN | BODY MASS INDEX: 28.72 KG/M2

## 2023-07-03 DIAGNOSIS — C73 PAPILLARY CARCINOMA OF THYROID (HCC): ICD-10-CM

## 2023-07-03 DIAGNOSIS — E89.0 POSTOPERATIVE HYPOTHYROIDISM: ICD-10-CM

## 2023-07-03 DIAGNOSIS — E89.2 POSTSURGICAL HYPOPARATHYROIDISM (HCC): ICD-10-CM

## 2023-07-03 DIAGNOSIS — K11.8 MASS OF LEFT PAROTID GLAND: ICD-10-CM

## 2023-07-03 DIAGNOSIS — E55.9 VITAMIN D DEFICIENCY: ICD-10-CM

## 2023-07-03 PROCEDURE — 99211 OFF/OP EST MAY X REQ PHY/QHP: CPT | Performed by: INTERNAL MEDICINE

## 2023-07-03 PROCEDURE — 99215 OFFICE O/P EST HI 40 MIN: CPT | Performed by: INTERNAL MEDICINE

## 2023-07-03 PROCEDURE — 3074F SYST BP LT 130 MM HG: CPT | Performed by: INTERNAL MEDICINE

## 2023-07-03 PROCEDURE — 3078F DIAST BP <80 MM HG: CPT | Performed by: INTERNAL MEDICINE

## 2023-07-03 RX ORDER — CALCITRIOL 0.25 UG/1
0.25 CAPSULE, LIQUID FILLED ORAL 2 TIMES DAILY
Qty: 180 CAPSULE | Refills: 2 | Status: SHIPPED | OUTPATIENT
Start: 2023-07-03 | End: 2023-11-20 | Stop reason: SDUPTHER

## 2023-07-03 RX ORDER — LEVOTHYROXINE SODIUM 88 UG/1
88 TABLET ORAL
Qty: 90 TABLET | Refills: 1 | Status: SHIPPED | OUTPATIENT
Start: 2023-07-03 | End: 2023-11-20

## 2023-07-03 ASSESSMENT — FIBROSIS 4 INDEX: FIB4 SCORE: 2.34

## 2023-07-03 NOTE — PROGRESS NOTES
CHIEF COMPLAINT: Followup of postsurgical hypothyroidism and papillary thyroid carcinoma.    HPI:   Don Olivas Jr. is a 57 y.o. male, who had initial total thyroidectomy on 6/2007 by Dr. Lindsey with pathology revealing papillary thyroid carcinoma 2.0 cm  Right lobe and 0.3 cm left lobe with no ETE.  He did not have problems with postoperative hypocalcemia.   He was not treated with radioactive iodine immediately post surgery initially but for a remnant ablation dose in 2017 ( see below)    He was seen by Dr. Lovell and Dr. Kang  and then by Dr. Hyatt.    US on 2015 - showed a:  suspicious mass on the right thyroid bed with intermediate echogenicity material in the right thyroid bed measuring 12 x 22 x 13 mm. In the center of this there is an indistinct hypoechoic region measuring 8 x 8 mm. There is internal vascular flow. No calcification.  In the left thyroid bed there is what also appears to represent some thyroid gland measuring 12 x 7 x 6 mm    Biopsy was not done for these masses    It was presumed by his previous endocrinologists that these masses represented residual thyroid tissue and he was referred to Dr. Lindsey for completion thyroidectomy.  However Dr. Lindsey required an FNA for the suspicious masses which never got completed for unclear reasons    The patient then received 50 mCi-131 on 9/2019 under the orders of Dr. Hyatt.  Post treatment scan showed:  3 discrete foci of increased uptake in the lower neck with associated star artifact. This presumably represents residual or recurrent thyroid tissue or disease. Cannot exclude any christiano disease.  Normal activity is seen in the liver, spleen       Follow-up neck US on Jan 2021 showed:   The right lobe of the thyroid gland measures 0.70 cm x 1.22 cm x 0.79 cm. The contour and echogenicity are normal. No focal mass lesions are identified.   The left lobe of the thyroid gland measures 0.62 cm x 0.75 cm x 0.46 cm. The contour and  echogenicity are normal. No focal mass lesions are identified.           Unstimulated thyroglobulin was 2.2 with negative antibodies in Aug 2019  Unstimulated thyroglobulin was 0.8 with negative antibodies in January 2020  Unstimulated thyroglobulin was 0.5 with negative antibodies in July 2020  Unstimulated thyroglobulin was 0.5 with negative antibodies on January 2021  Unstimulated thyroglobulin was 0.6 with negative antibodies on May 2022  Unstimulated thyroglobulin was 0.4 with negative antibodies on May 2022        Since last visit, he has remained on Levothyroxine 88 micrograms daily, which has been his dose since  24  months.  He is feeling well.  Energy level has been excellent.  Weight is Stable.  No palpitations, no frequent diarrhea, or frequent constipation.  No heat or cold intolerance.  No anterior neck symptoms or problems.  No voice changes.    His TSH is 0.1780 on 5/2023   His TSH was 1.8 with a free T4 of 1.41 on May 10, 2022.      Incidentally he has been dealing with hypocalcemia with inappropriately low intact PTH levels since February 2022 - c/w Postsurgical hypoparathyroidism   His ionized calcium was 1.0 with a magnesium of 2.1 vitamin D 53 and low PTH of 12 on 3/16/2022 compatible with hypoparathyroidism    He is now on Calcitriol 0.25mg  bid and calcium 600mg bid  He denies numbness and paresthesias  Calcium is 8.9  on 5/2023        Aside from this the patient has mass on the left parotid gland seen on ultrasound.    It was first discovered in 2007.  It originally measured 15 x 13 x 12 mm and on ultrasound in 2015 it measured 22 x 20 x 11 mm  Repeat CT scan on April 25, 2022 showed a 1.9 x 1.0 x 1.3 cm calcified lesion on the left parotid gland without apparent internal enhancement.  Incidentally the patient underwent core needle biopsy of this lesion which was nondiagnostic.  He was  evaluated by Dr. Chevalier at [facility].   He reports that Dr. Chevalier left and he has no follow up with  "ENT but he was told the parotid mass is benign.       Patient's medications, allergies, and social histories were reviewed and updated as appropriate.      ROS:      CONS:     No fever, no chills   EYES:     No diplopia, no blurry vision   CV:           No chest pain, no palpitations   PULM:     No SOB, no cough, no hemoptysis.   GI:            No nausea, no vomiting, no diarrhea, no constipation   ENDO:     No polyuria, no polydipsia, no heat intolerance, no cold intolerance       Past Medical History:  Problem List:  2023-05: Wellness examination  2023-05: Muscle cramp  2023-05: Trigger finger of right thumb  2022-10: Trigger ring finger of left hand  2022-10: Encounter to establish care  2022-06: Postsurgical hypoparathyroidism (HCC)  2022-05: Strain of left trapezius muscle  2020-09: Right knee pain  2015-10: Vitamin D deficiency  2015-07: Essential hypertension  2015-06: Parotid gland enlargement  2015-04: HTN (hypertension)  2013-01: ASTHMA  2012-10: Postsurgical hypothyroidism  Hypothyroidism  Insomnia  History of thyroid cancer  HTN (hypertension)  Sedative, hypnotic or anxiolytic dependence (HCC)      Past Surgical History:  Past Surgical History:   Procedure Laterality Date    COLONOSCOPY  05/08/2018    mild divertoculosis sigmoid colon, int hemorrhoids    THYROIDECTOMY TOTAL  2007    due to thyroid cancer        Allergies:  Lisinopril     Social History:  Social History     Tobacco Use    Smoking status: Every Day     Packs/day: 0.30     Years: 41.00     Pack years: 12.30     Types: Cigarettes    Smokeless tobacco: Never   Vaping Use    Vaping Use: Never used   Substance Use Topics    Alcohol use: No     Comment: none in 12 yrs    Drug use: No        Family History:   family history includes Thyroid in his sister and sister.      PHYSICAL EXAM:   Vital signs: /78 (BP Location: Left arm, Patient Position: Sitting, BP Cuff Size: Adult)   Pulse 91   Resp 20   Ht 1.676 m (5' 6\")   Wt 81.1 kg (178 lb " 11.2 oz)   SpO2 96%   BMI 28.84 kg/m²   GENERAL: Well-developed, well-nourished in no apparent distress.   EYE:  No ocular asymmetry, PERRLA  HENT: Pink, moist mucous membranes.    NECK: Well healed transverse scar, Thyroid is surgically absent   There is a palpable firm movable mass measuring more than 2 cm located inferior to the left parotid gland just below the angle of the jaw.  CARDIOVASCULAR:  No murmurs  LUNGS: Clear breath sounds  ABDOMEN: Soft, nontender   EXTREMITIES: No clubbing, cyanosis, or edema.   NEUROLOGICAL: No gross focal motor abnormalities   LYMPH: No cervical adenopathy palpated.   SKIN: No rashes, lesions.       Labs:  Lab Results   Component Value Date/Time    WBC 6.1 2023 06:38 AM    RBC 5.14 2023 06:38 AM    HEMOGLOBIN 16.3 2023 06:38 AM    MCV 92.2 2023 06:38 AM    MCH 31.7 2023 06:38 AM    MCHC 34.4 2023 06:38 AM    RDW 42.5 2023 06:38 AM    MPV 10.4 2023 06:38 AM       Lab Results   Component Value Date/Time    SODIUM 144 2023 06:47 AM    POTASSIUM 4.2 2023 06:47 AM    CHLORIDE 106 2023 06:47 AM    CO2 24 2023 06:47 AM    ANION 14.0 2023 06:47 AM    GLUCOSE 92 2023 06:47 AM    BUN 21 2023 06:47 AM    CREATININE 1.45 (H) 2023 06:47 AM    CREATININE 1.0 2007 08:25 AM    CALCIUM 8.9 2023 06:47 AM    ASTSGOT 37 2023 06:47 AM    ALTSGPT 28 2023 06:47 AM    TBILIRUBIN 0.8 2023 06:47 AM    ALBUMIN 4.4 2023 06:47 AM    TOTPROTEIN 7.4 2023 06:47 AM    GLOBULIN 3.0 2023 06:47 AM    AGRATIO 1.5 2023 06:47 AM       Lab Results   Component Value Date/Time    TSHULTRASEN 0.070 (L) 2021 0840     Lab Results   Component Value Date/Time    FREET4 1.73 (H) 2021 0840     Lab Results   Component Value Date/Time    FREET3 3.37 2021 0836     No results found for: THYSTIMIG      Imagin2021 12:42 PM     HISTORY/REASON FOR EXAM:         TECHNIQUE/EXAM DESCRIPTION:  Ultrasound of the soft tissues of the head and neck.     COMPARISON:  None     FINDINGS:  The thyroid gland is heterogeneous.  Vascularity is normal.     The right lobe of the thyroid gland measures 0.70 cm x 1.22 cm x 0.79 cm. The contour and echogenicity are normal. No focal mass lesions are identified.  The left lobe of the thyroid gland measures 0.62 cm x 0.75 cm x 0.46 cm. The contour and echogenicity are normal. No focal mass lesions are identified.     No discrete nodules are identified.     IMPRESSION:     No discrete nodules or masses identified.        ASSESSMENT/PLAN:     1. Postsurgical hypothyroidism  Controlled  Continue levothyroxine 88 MCG daily  Repeat TSH levels in 6 months    2. Papillary carcinoma of thyroid (HCC)  Stable  Biochemically indeterminate response to therapy noted  Previous structural abnormality seen on ultrasound may represent residual thyroid tissue  It is notable that the patient's quantitative thyroglobulin levels are not rising indicating lack of evidence of thyroid cancer recurrence  Continue monitoring structural abnormality seen on neck ultrasound  repeat quantitative thyroglobulin every 6 months  Get neck ultrasound in this year     3. Postsurgical hypoparathyroidism (HCC)  Controlled   Continue  calcitriol 0.25 mcg twice a day  Continue calcium supplementation calcium carbonate 600 mg twice a day of   Continue monitoring       4. Vitamin D deficiency  Controlled  Continue current supplements      5. Mass of left parotid gland  Stable  Continue follow-up with [facility],  Recommend observation      Return in about 6 months (around 1/3/2024).        Total time spent on day of service was over 60 minutes which included obtaining a detailed history and physical exam, ordering labs, coordinating care and scheduling future follow-up      Thank you kindly for allowing me to participate in the thyroid care plan for this patient.    Erlin Crane  MD, FACE, [facility]      CC:   Rebekah Calix M.D.

## 2023-07-04 DIAGNOSIS — G47.00 INSOMNIA, UNSPECIFIED TYPE: ICD-10-CM

## 2023-07-06 RX ORDER — ZOLPIDEM TARTRATE 10 MG/1
10 TABLET ORAL NIGHTLY PRN
Qty: 30 TABLET | Refills: 1 | Status: SHIPPED | OUTPATIENT
Start: 2023-07-06 | End: 2023-09-25

## 2023-07-29 RX ORDER — ROSUVASTATIN CALCIUM 20 MG/1
20 TABLET, COATED ORAL EVERY EVENING
Qty: 90 TABLET | Refills: 3 | Status: SHIPPED | OUTPATIENT
Start: 2023-07-29

## 2023-08-01 ENCOUNTER — HOSPITAL ENCOUNTER (OUTPATIENT)
Dept: RADIOLOGY | Facility: MEDICAL CENTER | Age: 58
End: 2023-08-01
Attending: INTERNAL MEDICINE
Payer: COMMERCIAL

## 2023-08-01 DIAGNOSIS — E89.0 POSTOPERATIVE HYPOTHYROIDISM: ICD-10-CM

## 2023-08-01 DIAGNOSIS — C73 PAPILLARY CARCINOMA OF THYROID (HCC): ICD-10-CM

## 2023-08-01 DIAGNOSIS — E89.2 POSTSURGICAL HYPOPARATHYROIDISM (HCC): ICD-10-CM

## 2023-08-01 PROCEDURE — 76536 US EXAM OF HEAD AND NECK: CPT

## 2023-08-02 RX ORDER — ALBUTEROL SULFATE 90 UG/1
1-2 AEROSOL, METERED RESPIRATORY (INHALATION) EVERY 6 HOURS PRN
Qty: 1 EACH | Refills: 2 | Status: SHIPPED | OUTPATIENT
Start: 2023-08-02

## 2023-09-23 DIAGNOSIS — G47.00 INSOMNIA, UNSPECIFIED TYPE: ICD-10-CM

## 2023-09-25 RX ORDER — ZOLPIDEM TARTRATE 10 MG/1
TABLET ORAL
Qty: 30 TABLET | Refills: 0 | Status: SHIPPED | OUTPATIENT
Start: 2023-09-25 | End: 2023-09-27 | Stop reason: SDUPTHER

## 2023-09-27 DIAGNOSIS — G47.00 INSOMNIA, UNSPECIFIED TYPE: ICD-10-CM

## 2023-09-27 RX ORDER — ZOLPIDEM TARTRATE 10 MG/1
10 TABLET ORAL NIGHTLY PRN
Qty: 30 TABLET | Refills: 0 | Status: SHIPPED | OUTPATIENT
Start: 2023-09-27 | End: 2023-10-27

## 2023-10-23 DIAGNOSIS — I10 ESSENTIAL HYPERTENSION: ICD-10-CM

## 2023-10-23 RX ORDER — LOSARTAN POTASSIUM 100 MG/1
TABLET ORAL
Qty: 90 TABLET | Refills: 1 | Status: SHIPPED | OUTPATIENT
Start: 2023-10-23

## 2023-11-07 ENCOUNTER — HOSPITAL ENCOUNTER (OUTPATIENT)
Dept: LAB | Facility: MEDICAL CENTER | Age: 58
End: 2023-11-07
Attending: INTERNAL MEDICINE
Payer: COMMERCIAL

## 2023-11-07 ENCOUNTER — OFFICE VISIT (OUTPATIENT)
Dept: MEDICAL GROUP | Facility: PHYSICIAN GROUP | Age: 58
End: 2023-11-07
Payer: COMMERCIAL

## 2023-11-07 ENCOUNTER — HOSPITAL ENCOUNTER (OUTPATIENT)
Dept: LAB | Facility: MEDICAL CENTER | Age: 58
End: 2023-11-07
Attending: FAMILY MEDICINE
Payer: COMMERCIAL

## 2023-11-07 VITALS
RESPIRATION RATE: 18 BRPM | BODY MASS INDEX: 28.28 KG/M2 | SYSTOLIC BLOOD PRESSURE: 126 MMHG | WEIGHT: 176 LBS | HEIGHT: 66 IN | OXYGEN SATURATION: 98 % | HEART RATE: 84 BPM | TEMPERATURE: 98.9 F | DIASTOLIC BLOOD PRESSURE: 74 MMHG

## 2023-11-07 DIAGNOSIS — M54.16 LUMBAR RADICULOPATHY: ICD-10-CM

## 2023-11-07 DIAGNOSIS — E55.9 VITAMIN D DEFICIENCY: ICD-10-CM

## 2023-11-07 DIAGNOSIS — F51.01 PRIMARY INSOMNIA: ICD-10-CM

## 2023-11-07 DIAGNOSIS — E89.0 POSTOPERATIVE HYPOTHYROIDISM: ICD-10-CM

## 2023-11-07 DIAGNOSIS — E78.00 PURE HYPERCHOLESTEROLEMIA: ICD-10-CM

## 2023-11-07 DIAGNOSIS — E89.2 POSTSURGICAL HYPOPARATHYROIDISM (HCC): ICD-10-CM

## 2023-11-07 DIAGNOSIS — C73 PAPILLARY CARCINOMA OF THYROID (HCC): ICD-10-CM

## 2023-11-07 DIAGNOSIS — K11.8 MASS OF LEFT PAROTID GLAND: ICD-10-CM

## 2023-11-07 PROBLEM — Z00.00 WELLNESS EXAMINATION: Status: RESOLVED | Noted: 2023-05-22 | Resolved: 2023-11-07

## 2023-11-07 LAB
25(OH)D3 SERPL-MCNC: 40 NG/ML (ref 30–100)
ALBUMIN SERPL BCP-MCNC: 4.5 G/DL (ref 3.2–4.9)
ALBUMIN/GLOB SERPL: 1.5 G/DL
ALP SERPL-CCNC: 70 U/L (ref 30–99)
ALT SERPL-CCNC: 39 U/L (ref 2–50)
ANION GAP SERPL CALC-SCNC: 11 MMOL/L (ref 7–16)
AST SERPL-CCNC: 35 U/L (ref 12–45)
BILIRUB SERPL-MCNC: 0.6 MG/DL (ref 0.1–1.5)
BUN SERPL-MCNC: 20 MG/DL (ref 8–22)
CALCIUM ALBUM COR SERPL-MCNC: 8.7 MG/DL (ref 8.5–10.5)
CALCIUM SERPL-MCNC: 9.1 MG/DL (ref 8.5–10.5)
CHLORIDE SERPL-SCNC: 104 MMOL/L (ref 96–112)
CHOLEST SERPL-MCNC: 158 MG/DL (ref 100–199)
CO2 SERPL-SCNC: 26 MMOL/L (ref 20–33)
CREAT SERPL-MCNC: 1.43 MG/DL (ref 0.5–1.4)
FASTING STATUS PATIENT QL REPORTED: NORMAL
GFR SERPLBLD CREATININE-BSD FMLA CKD-EPI: 56 ML/MIN/1.73 M 2
GLOBULIN SER CALC-MCNC: 3 G/DL (ref 1.9–3.5)
GLUCOSE SERPL-MCNC: 92 MG/DL (ref 65–99)
HDLC SERPL-MCNC: 57 MG/DL
LDLC SERPL CALC-MCNC: 81 MG/DL
POTASSIUM SERPL-SCNC: 4.1 MMOL/L (ref 3.6–5.5)
PROT SERPL-MCNC: 7.5 G/DL (ref 6–8.2)
SODIUM SERPL-SCNC: 141 MMOL/L (ref 135–145)
T4 FREE SERPL-MCNC: 1.53 NG/DL (ref 0.93–1.7)
TRIGL SERPL-MCNC: 101 MG/DL (ref 0–149)
TSH SERPL DL<=0.005 MIU/L-ACNC: 0.2 UIU/ML (ref 0.38–5.33)

## 2023-11-07 PROCEDURE — 84432 ASSAY OF THYROGLOBULIN: CPT

## 2023-11-07 PROCEDURE — 84439 ASSAY OF FREE THYROXINE: CPT

## 2023-11-07 PROCEDURE — 3078F DIAST BP <80 MM HG: CPT | Performed by: FAMILY MEDICINE

## 2023-11-07 PROCEDURE — 80053 COMPREHEN METABOLIC PANEL: CPT

## 2023-11-07 PROCEDURE — 3074F SYST BP LT 130 MM HG: CPT | Performed by: FAMILY MEDICINE

## 2023-11-07 PROCEDURE — 80061 LIPID PANEL: CPT

## 2023-11-07 PROCEDURE — 99214 OFFICE O/P EST MOD 30 MIN: CPT | Performed by: FAMILY MEDICINE

## 2023-11-07 PROCEDURE — 82306 VITAMIN D 25 HYDROXY: CPT

## 2023-11-07 PROCEDURE — 86800 THYROGLOBULIN ANTIBODY: CPT

## 2023-11-07 PROCEDURE — 36415 COLL VENOUS BLD VENIPUNCTURE: CPT

## 2023-11-07 PROCEDURE — 84443 ASSAY THYROID STIM HORMONE: CPT

## 2023-11-07 RX ORDER — ZOLPIDEM TARTRATE 10 MG/1
10 TABLET ORAL NIGHTLY PRN
Qty: 30 TABLET | Refills: 0 | Status: SHIPPED | OUTPATIENT
Start: 2023-11-07 | End: 2023-12-05

## 2023-11-07 ASSESSMENT — FIBROSIS 4 INDEX: FIB4 SCORE: 2.34

## 2023-11-07 NOTE — ASSESSMENT & PLAN NOTE
This is a chronic problem.  Patient is here today to get a refill of his Ambien.  He uses it as needed and generally a 30-day supply of the last several months.  We will also signed a new controlled substance agreement form today.

## 2023-11-07 NOTE — PROGRESS NOTES
Subjective:     CC: Here for couple of issues.    HPI:   Don presents today with the following medical concerns:    Insomnia  This is a chronic problem.  Patient is here today to get a refill of his Ambien.  He uses it as needed and generally a 30-day supply of the last several months.  We will also signed a new controlled substance agreement form today.    Lumbar radiculopathy  This is a new problem.  Patient has been having low back pain rating down his left leg at times.  He would like to use some kind of ointment or cream for it since he cannot use NSAIDs due to his renal dysfunction.  His last x-rays were done 7 years ago and did show arthritic changes.  He also has a history of falling off a ladder injuring his lower back.  No weakness in his legs.    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    biopsy-benign    Papillary carcinoma of thyroid (HCC)     Positive urine drug screen 03/2018    positive THC    Sedative hypnotic or anxiolytic dependence (HCC)        Social History     Tobacco Use    Smoking status: Every Day     Current packs/day: 0.30     Average packs/day: 0.3 packs/day for 41.0 years (12.3 ttl pk-yrs)     Types: Cigarettes    Smokeless tobacco: Never   Vaping Use    Vaping Use: Never used   Substance Use Topics    Alcohol use: No     Comment: none in 12 yrs    Drug use: No       Current Outpatient Medications Ordered in Epic   Medication Sig Dispense Refill    diclofenac sodium (VOLTAREN) 1 % Gel Apply 2 g topically 4 times a day as needed (pain). 100 g 3    zolpidem (AMBIEN) 10 MG Tab Take 1 Tablet by mouth at bedtime as needed for Sleep for up to 30 days. 30 Tablet 0    losartan (COZAAR) 100 MG Tab TAKE 1 TABLET BY MOUTH EVERY DAY 90 Tablet 1    albuterol 108 (90 Base) MCG/ACT Aero Soln inhalation aerosol Inhale 1-2 Puffs every 6  "hours as needed for Shortness of Breath. 1 Each 2    rosuvastatin (CRESTOR) 20 MG Tab TAKE 1 TABLET BY MOUTH EVERY DAY IN THE EVENING 90 Tablet 3    levothyroxine (SYNTHROID) 88 MCG Tab Take 1 Tablet by mouth every morning on an empty stomach. 90 Tablet 1    calcitRIOL (ROCALTROL) 0.25 MCG Cap Take 1 Capsule by mouth 2 times a day. 180 Capsule 2    amLODIPine (NORVASC) 5 MG Tab TAKE 1 TABLET BY MOUTH EVERY DAY 90 Tablet 2    Aug Betamethasone Dipropionate (DIPROLENE-AF) 0.05 % Cream APPLY A THIN LAYER TO THE AFFECTED AREA TWICE DAILY 30 g 3    MAGNESIUM PO Take  by mouth.      Vitamin D, Ergocalciferol, 2000 units Cap Take  by mouth.      Calcium Carb-Cholecalciferol (CALCIUM 600 + D PO) Take  by mouth 2 Times a Day.       No current [facility]-ordered facility-administered medications on file.       Allergies:  Lisinopril and Atorvastatin    Health Maintenance: Completed    ROS:  Gen: no fevers/chills, no changes in weight  Eyes: no changes in vision  ENT: no sore throat, no hearing loss, no bloody nose  Pulm: no sob, no cough  CV: no chest pain, no palpitations  GI: no nausea/vomiting, no diarrhea  : no dysuria  MSk: no myalgias  Skin: no rash  Neuro: no headaches, no numbness/tingling  Heme/Lymph: no easy bruising      Objective:       Exam:  /74 (BP Location: Left arm, Patient Position: Sitting, BP Cuff Size: Adult)   Pulse 84   Temp 37.2 °C (98.9 °F) (Temporal)   Resp 18   Ht 1.676 m (5' 6\")   Wt 79.8 kg (176 lb)   SpO2 98%   BMI 28.41 kg/m²  Body mass index is 28.41 kg/m².    Gen: Alert and oriented, No apparent distress.  Ext: No clubbing, cyanosis, edema.  Neuro: Straight leg raise on the left is negative.  Gait is normal.    Labs: Results pending    Assessment & Plan:     57 y.o. male with the following -     1. Primary insomnia  This is a chronic problem.  Agreement signed and medication renewed.  - Controlled Substance Treatment Agreement  - zolpidem (AMBIEN) 10 MG Tab; Take 1 Tablet by mouth at " bedtime as needed for Sleep for up to 30 days.  Dispense: 30 Tablet; Refill: 0    2. Lumbar radiculopathy  This is a new problem.  Patient was told he most likely does have some nerve impingement causing his symptoms.  Since it is intermittent we will go ahead and try him on Voltaren gel.  If its not helpful he is to let me know and then we could get a MRI and x-ray before further treatment.      Return in about 6 months (around 5/7/2024) for annual exam.    Please note that this dictation was created using voice recognition software. I have made every reasonable attempt to correct obvious errors, but I expect that there are errors of grammar and possibly content that I did not discover before finalizing the note.

## 2023-11-07 NOTE — ASSESSMENT & PLAN NOTE
This is a new problem.  Patient has been having low back pain rating down his left leg at times.  He would like to use some kind of ointment or cream for it since he cannot use NSAIDs due to his renal dysfunction.  His last x-rays were done 7 years ago and did show arthritic changes.  He also has a history of falling off a ladder injuring his lower back.  No weakness in his legs.

## 2023-11-20 ENCOUNTER — OFFICE VISIT (OUTPATIENT)
Dept: ENDOCRINOLOGY | Facility: MEDICAL CENTER | Age: 58
End: 2023-11-20
Attending: INTERNAL MEDICINE
Payer: COMMERCIAL

## 2023-11-20 VITALS
SYSTOLIC BLOOD PRESSURE: 100 MMHG | OXYGEN SATURATION: 95 % | DIASTOLIC BLOOD PRESSURE: 62 MMHG | BODY MASS INDEX: 28.25 KG/M2 | HEART RATE: 95 BPM | HEIGHT: 66 IN | RESPIRATION RATE: 20 BRPM | WEIGHT: 175.8 LBS

## 2023-11-20 DIAGNOSIS — E89.2 POSTSURGICAL HYPOPARATHYROIDISM (HCC): ICD-10-CM

## 2023-11-20 DIAGNOSIS — C73 PAPILLARY CARCINOMA OF THYROID (HCC): ICD-10-CM

## 2023-11-20 DIAGNOSIS — E89.0 POSTOPERATIVE HYPOTHYROIDISM: ICD-10-CM

## 2023-11-20 DIAGNOSIS — E55.9 VITAMIN D DEFICIENCY: ICD-10-CM

## 2023-11-20 PROCEDURE — 3074F SYST BP LT 130 MM HG: CPT | Performed by: INTERNAL MEDICINE

## 2023-11-20 PROCEDURE — 99214 OFFICE O/P EST MOD 30 MIN: CPT | Performed by: INTERNAL MEDICINE

## 2023-11-20 PROCEDURE — 99212 OFFICE O/P EST SF 10 MIN: CPT | Performed by: INTERNAL MEDICINE

## 2023-11-20 PROCEDURE — 3078F DIAST BP <80 MM HG: CPT | Performed by: INTERNAL MEDICINE

## 2023-11-20 RX ORDER — LEVOTHYROXINE SODIUM 0.07 MG/1
75 TABLET ORAL
Qty: 90 TABLET | Refills: 2 | Status: SHIPPED | OUTPATIENT
Start: 2023-11-20

## 2023-11-20 RX ORDER — CALCITRIOL 0.25 UG/1
0.25 CAPSULE, LIQUID FILLED ORAL 2 TIMES DAILY
Qty: 180 CAPSULE | Refills: 2 | Status: SHIPPED | OUTPATIENT
Start: 2023-11-20

## 2023-11-20 ASSESSMENT — FIBROSIS 4 INDEX: FIB4 SCORE: 1.91

## 2023-11-20 NOTE — PROGRESS NOTES
CHIEF COMPLAINT: Followup of postsurgical hypothyroidism and papillary thyroid carcinoma.    HPI:   Don Olivas Jr. is a 58 y.o. male, who had initial total thyroidectomy on 6/2007 by Dr. Lindsey with pathology revealing papillary thyroid carcinoma 2.0 cm  Right lobe and 0.3 cm left lobe with no ETE.  He did not have problems with postoperative hypocalcemia.   He was not treated with radioactive iodine immediately post surgery initially but for a remnant ablation dose in 2017 ( see below)    He was seen by Dr. Lovell and Dr. Kang  and then by Dr. Hyatt.    US on 2015 - showed a:  suspicious mass on the right thyroid bed with intermediate echogenicity material in the right thyroid bed measuring 12 x 22 x 13 mm. In the center of this there is an indistinct hypoechoic region measuring 8 x 8 mm. There is internal vascular flow. No calcification.  In the left thyroid bed there is what also appears to represent some thyroid gland measuring 12 x 7 x 6 mm    Biopsy was not done for these masses    It was presumed by his previous endocrinologists that these masses represented residual thyroid tissue and he was referred to Dr. Lindsey for completion thyroidectomy.  However Dr. Lindsey required an FNA for the suspicious masses which never got completed for unclear reasons    The patient then received 50 mCi-131 on 9/2019 under the orders of Dr. Hyatt.  Post treatment scan showed:  3 discrete foci of increased uptake in the lower neck with associated star artifact. This presumably represents residual or recurrent thyroid tissue or disease. Cannot exclude any christiano disease.  Normal activity is seen in the liver, spleen       Follow-up neck US on Jan 2021 showed:   The right lobe of the thyroid gland measures 0.70 cm x 1.22 cm x 0.79 cm. The contour and echogenicity are normal. No focal mass lesions are identified.   The left lobe of the thyroid gland measures 0.62 cm x 0.75 cm x 0.46 cm. The contour and  echogenicity are normal. No focal mass lesions are identified.    US on 8/2023 showed no suspicious masses in the neck  There was no mention regarding the previously detected residual thyroid tissue on the thyroid bed       Unstimulated thyroglobulin was 2.2 with negative antibodies in Aug 2019  Unstimulated thyroglobulin was 0.8 with negative antibodies in January 2020  Unstimulated thyroglobulin was 0.5 with negative antibodies in July 2020  Unstimulated thyroglobulin was 0.5 with negative antibodies on January 2021  Unstimulated thyroglobulin was 0.6 with negative antibodies on May 2022  Unstimulated thyroglobulin was 0.4 with negative antibodies on May 2022  Unstimulated thyroglobulin was 0.5 with negative antibodies on Nov 2023      Since last visit, he has remained on Levothyroxine 88 micrograms daily, which has been his dose since  30  months.  He is feeling well.  Energy level has been excellent.  Weight is Stable.  No palpitations, no frequent diarrhea, or frequent constipation.  No heat or cold intolerance.  No anterior neck symptoms or problems.  No voice changes.    His TSH is 0.200 on 11/2023  His TSH was 0.180 on 5/2023   His TSH was 1.8 with a free T4 of 1.41 on May 10, 2022.      Incidentally he has been dealing with hypocalcemia with inappropriately low intact PTH levels since February 2022 - c/w Postsurgical hypoparathyroidism.  At baseline, his ionized calcium was 1.0 with a magnesium of 2.1 vitamin D 53 and low PTH of 12 on 3/16/2022 compatible with hypoparathyroidism    He is now on Calcitriol 0.25mg  bid and calcium 600mg bid  He denies numbness and paresthesias  Calcium is 9.1 on 11/2023        Aside from this the patient has mass on the left parotid gland seen on ultrasound.    It was first discovered in 2007.  It originally measured 15 x 13 x 12 mm and on ultrasound in 2015 it measured 22 x 20 x 11 mm  Repeat CT scan on April 25, 2022 showed a 1.9 x 1.0 x 1.3 cm calcified lesion on the left  parotid gland without apparent internal enhancement.  Incidentally the patient underwent core needle biopsy of this lesion which was nondiagnostic.  He was  evaluated by Dr. Chevalier at [facility].   He reports he has no follow up with ENT but he was told the parotid mass is benign.       Patient's medications, allergies, and social histories were reviewed and updated as appropriate.      ROS:      CONS:     No fever, no chills   EYES:     No diplopia, no blurry vision   CV:           No chest pain, no palpitations   PULM:     No SOB, no cough, no hemoptysis.   GI:            No nausea, no vomiting, no diarrhea, no constipation   ENDO:     No polyuria, no polydipsia, no heat intolerance, no cold intolerance       Past Medical History:  Problem List:  2023-11: Lumbar radiculopathy  2023-05: Wellness examination  2023-05: Muscle cramp  2023-05: Trigger finger of right thumb  2022-10: Trigger ring finger of left hand  2022-10: Encounter to establish care  2022-06: Postsurgical hypoparathyroidism (HCC)  2022-05: Strain of left trapezius muscle  2020-09: Right knee pain  2015-10: Vitamin D deficiency  2015-07: Essential hypertension  2015-06: Parotid gland enlargement  2015-04: HTN (hypertension)  2013-01: ASTHMA  2012-10: Postsurgical hypothyroidism  Hypothyroidism  Insomnia  History of thyroid cancer  HTN (hypertension)  Sedative, hypnotic or anxiolytic dependence (HCC)      Past Surgical History:  Past Surgical History:   Procedure Laterality Date    COLONOSCOPY  05/08/2018    mild divertoculosis sigmoid colon, int hemorrhoids    THYROIDECTOMY TOTAL  2007    due to thyroid cancer        Allergies:  Lisinopril     Social History:  Social History     Tobacco Use    Smoking status: Every Day     Current packs/day: 0.30     Average packs/day: 0.3 packs/day for 41.0 years (12.3 ttl pk-yrs)     Types: Cigarettes    Smokeless tobacco: Never   Vaping Use    Vaping Use: Never used   Substance Use Topics    Alcohol use: No      "Comment: none in 12 yrs    Drug use: No        Family History:   family history includes Thyroid in his sister and sister.      PHYSICAL EXAM:   Vital signs: /62 (BP Location: Right arm, Patient Position: Sitting, BP Cuff Size: Adult)   Pulse 95   Resp 20   Ht 1.676 m (5' 6\")   Wt 79.7 kg (175 lb 12.8 oz)   SpO2 95%   BMI 28.37 kg/m²   GENERAL: Well-developed, well-nourished in no apparent distress.   EYE:  No ocular asymmetry, PERRLA  HENT: Pink, moist mucous membranes.    NECK: Well healed transverse scar, Thyroid is surgically absent   There is a palpable firm movable mass measuring more than 2 cm located inferior to the left parotid gland just below the angle of the jaw.  CARDIOVASCULAR:  No murmurs  LUNGS: Clear breath sounds  ABDOMEN: Soft, nontender   EXTREMITIES: No clubbing, cyanosis, or edema.   NEUROLOGICAL: No gross focal motor abnormalities   LYMPH: No cervical adenopathy palpated.   SKIN: No rashes, lesions.       Labs:  Lab Results   Component Value Date/Time    WBC 6.1 05/23/2023 06:38 AM    RBC 5.14 05/23/2023 06:38 AM    HEMOGLOBIN 16.3 05/23/2023 06:38 AM    MCV 92.2 05/23/2023 06:38 AM    MCH 31.7 05/23/2023 06:38 AM    MCHC 34.4 05/23/2023 06:38 AM    RDW 42.5 05/23/2023 06:38 AM    MPV 10.4 05/23/2023 06:38 AM       Lab Results   Component Value Date/Time    SODIUM 141 11/07/2023 06:30 AM    POTASSIUM 4.1 11/07/2023 06:30 AM    CHLORIDE 104 11/07/2023 06:30 AM    CO2 26 11/07/2023 06:30 AM    ANION 11.0 11/07/2023 06:30 AM    GLUCOSE 92 11/07/2023 06:30 AM    BUN 20 11/07/2023 06:30 AM    CREATININE 1.43 (H) 11/07/2023 06:30 AM    CREATININE 1.0 06/30/2007 08:25 AM    CALCIUM 9.1 11/07/2023 06:30 AM    ASTSGOT 35 11/07/2023 06:30 AM    ALTSGPT 39 11/07/2023 06:30 AM    TBILIRUBIN 0.6 11/07/2023 06:30 AM    ALBUMIN 4.5 11/07/2023 06:30 AM    TOTPROTEIN 7.5 11/07/2023 06:30 AM    GLOBULIN 3.0 11/07/2023 06:30 AM    AGRATIO 1.5 11/07/2023 06:30 AM       Lab Results   Component Value " Date/Time    TSHULTRASEN 0.070 (L) 2021 0840     Lab Results   Component Value Date/Time    FREET4 1.73 (H) 2021 0840     Lab Results   Component Value Date/Time    FREET3 3.37 2021 0836     No results found for: THYSTIMIG      Imagin2021 12:42 PM     HISTORY/REASON FOR EXAM:        TECHNIQUE/EXAM DESCRIPTION:  Ultrasound of the soft tissues of the head and neck.     COMPARISON:  None     FINDINGS:  The thyroid gland is heterogeneous.  Vascularity is normal.     The right lobe of the thyroid gland measures 0.70 cm x 1.22 cm x 0.79 cm. The contour and echogenicity are normal. No focal mass lesions are identified.  The left lobe of the thyroid gland measures 0.62 cm x 0.75 cm x 0.46 cm. The contour and echogenicity are normal. No focal mass lesions are identified.     No discrete nodules are identified.     IMPRESSION:     No discrete nodules or masses identified.        ASSESSMENT/PLAN:     1. Postsurgical hypothyroidism  Uncontrolled  Tsh is low, given his thyroid cancer is stable  TSH suppression is no longer needed.  Adjust  levothyroxine to 75 MCG daily  Repeat TSH levels in 6 months    2. Papillary carcinoma of thyroid (HCC)  Stable  Previous structural abnormality seen on ultrasound may represent residual thyroid tissue  It is notable that the patient's quantitative thyroglobulin levels are not rising indicating lack of evidence of thyroid cancer recurrence  Continue monitoring   Repeat quantitative thyroglobulin every 6 months  Repeat neck Us in     3. Postsurgical hypoparathyroidism (HCC)  Controlled   Continue  calcitriol 0.25 mcg twice a day  Continue calcium supplementation calcium citrate 600 mg twice a day of   Continue monitoring     4. Vitamin D deficiency  Controlled  Continue current supplements    5. Mass of left parotid gland  Stable palpable firm left submandibular mass w/ previous benign biopsy per patient  Continue follow-up with [facility] prn  Recommend  observation      Return in about 6 months (around 5/20/2024).        Thank you kindly for allowing me to participate in the thyroid care plan for this patient.    Erlin Crane MD, FACE, [facility]      CC:   Gurwinder Rosario III, M.D.

## 2023-11-28 DIAGNOSIS — I10 ESSENTIAL HYPERTENSION: ICD-10-CM

## 2023-11-28 RX ORDER — AMLODIPINE BESYLATE 5 MG/1
5 TABLET ORAL DAILY
Qty: 90 TABLET | Refills: 3 | Status: SHIPPED | OUTPATIENT
Start: 2023-11-28

## 2023-12-05 DIAGNOSIS — F51.01 PRIMARY INSOMNIA: ICD-10-CM

## 2023-12-05 RX ORDER — ZOLPIDEM TARTRATE 10 MG/1
10 TABLET ORAL NIGHTLY PRN
Qty: 30 TABLET | Refills: 0 | Status: SHIPPED | OUTPATIENT
Start: 2023-12-05 | End: 2024-01-11

## 2024-01-10 DIAGNOSIS — F51.01 PRIMARY INSOMNIA: ICD-10-CM

## 2024-01-11 RX ORDER — ZOLPIDEM TARTRATE 10 MG/1
10 TABLET ORAL NIGHTLY PRN
Qty: 30 TABLET | Refills: 0 | Status: SHIPPED | OUTPATIENT
Start: 2024-01-11 | End: 2024-02-10

## 2024-02-05 ENCOUNTER — APPOINTMENT (OUTPATIENT)
Dept: ENDOCRINOLOGY | Facility: MEDICAL CENTER | Age: 59
End: 2024-02-05
Attending: INTERNAL MEDICINE
Payer: COMMERCIAL

## 2024-02-06 ENCOUNTER — HOSPITAL ENCOUNTER (OUTPATIENT)
Dept: LAB | Facility: MEDICAL CENTER | Age: 59
End: 2024-02-06
Attending: INTERNAL MEDICINE
Payer: COMMERCIAL

## 2024-02-06 DIAGNOSIS — E89.2 POSTSURGICAL HYPOPARATHYROIDISM (HCC): ICD-10-CM

## 2024-02-06 DIAGNOSIS — E55.9 VITAMIN D DEFICIENCY: ICD-10-CM

## 2024-02-06 DIAGNOSIS — C73 PAPILLARY CARCINOMA OF THYROID (HCC): ICD-10-CM

## 2024-02-06 DIAGNOSIS — E89.0 POSTOPERATIVE HYPOTHYROIDISM: ICD-10-CM

## 2024-02-06 LAB
25(OH)D3 SERPL-MCNC: 38 NG/ML (ref 30–100)
ALBUMIN SERPL BCP-MCNC: 4.3 G/DL (ref 3.2–4.9)
ALBUMIN/GLOB SERPL: 1.7 G/DL
ALP SERPL-CCNC: 67 U/L (ref 30–99)
ALT SERPL-CCNC: 26 U/L (ref 2–50)
ANION GAP SERPL CALC-SCNC: 12 MMOL/L (ref 7–16)
AST SERPL-CCNC: 18 U/L (ref 12–45)
BILIRUB SERPL-MCNC: 0.5 MG/DL (ref 0.1–1.5)
BUN SERPL-MCNC: 19 MG/DL (ref 8–22)
CALCIUM ALBUM COR SERPL-MCNC: 8.1 MG/DL (ref 8.5–10.5)
CALCIUM SERPL-MCNC: 8.3 MG/DL (ref 8.5–10.5)
CHLORIDE SERPL-SCNC: 106 MMOL/L (ref 96–112)
CO2 SERPL-SCNC: 24 MMOL/L (ref 20–33)
CREAT SERPL-MCNC: 1.55 MG/DL (ref 0.5–1.4)
GFR SERPLBLD CREATININE-BSD FMLA CKD-EPI: 51 ML/MIN/1.73 M 2
GLOBULIN SER CALC-MCNC: 2.6 G/DL (ref 1.9–3.5)
GLUCOSE SERPL-MCNC: 123 MG/DL (ref 65–99)
PHOSPHATE SERPL-MCNC: 4.6 MG/DL (ref 2.5–4.5)
POTASSIUM SERPL-SCNC: 3.9 MMOL/L (ref 3.6–5.5)
PROT SERPL-MCNC: 6.9 G/DL (ref 6–8.2)
SODIUM SERPL-SCNC: 142 MMOL/L (ref 135–145)
T4 FREE SERPL-MCNC: 1.35 NG/DL (ref 0.93–1.7)
TSH SERPL DL<=0.005 MIU/L-ACNC: 1.5 UIU/ML (ref 0.38–5.33)

## 2024-02-06 PROCEDURE — 86800 THYROGLOBULIN ANTIBODY: CPT

## 2024-02-06 PROCEDURE — 84432 ASSAY OF THYROGLOBULIN: CPT

## 2024-02-06 PROCEDURE — 84439 ASSAY OF FREE THYROXINE: CPT

## 2024-02-06 PROCEDURE — 80053 COMPREHEN METABOLIC PANEL: CPT

## 2024-02-06 PROCEDURE — 36415 COLL VENOUS BLD VENIPUNCTURE: CPT

## 2024-02-06 PROCEDURE — 84443 ASSAY THYROID STIM HORMONE: CPT

## 2024-02-06 PROCEDURE — 82306 VITAMIN D 25 HYDROXY: CPT

## 2024-02-06 PROCEDURE — 84100 ASSAY OF PHOSPHORUS: CPT

## 2024-02-20 ENCOUNTER — OFFICE VISIT (OUTPATIENT)
Dept: MEDICAL GROUP | Facility: PHYSICIAN GROUP | Age: 59
End: 2024-02-20
Payer: COMMERCIAL

## 2024-02-20 VITALS
SYSTOLIC BLOOD PRESSURE: 124 MMHG | OXYGEN SATURATION: 98 % | HEIGHT: 66 IN | WEIGHT: 181 LBS | TEMPERATURE: 98.6 F | RESPIRATION RATE: 18 BRPM | HEART RATE: 90 BPM | DIASTOLIC BLOOD PRESSURE: 68 MMHG | BODY MASS INDEX: 29.09 KG/M2

## 2024-02-20 DIAGNOSIS — Z00.00 WELLNESS EXAMINATION: ICD-10-CM

## 2024-02-20 DIAGNOSIS — F51.01 PRIMARY INSOMNIA: ICD-10-CM

## 2024-02-20 PROCEDURE — 3078F DIAST BP <80 MM HG: CPT | Performed by: FAMILY MEDICINE

## 2024-02-20 PROCEDURE — 99213 OFFICE O/P EST LOW 20 MIN: CPT | Performed by: FAMILY MEDICINE

## 2024-02-20 PROCEDURE — 3074F SYST BP LT 130 MM HG: CPT | Performed by: FAMILY MEDICINE

## 2024-02-20 RX ORDER — ZOLPIDEM TARTRATE 10 MG/1
10 TABLET ORAL NIGHTLY PRN
Qty: 90 TABLET | Refills: 0 | Status: SHIPPED | OUTPATIENT
Start: 2024-02-20 | End: 2024-05-20

## 2024-02-20 ASSESSMENT — FIBROSIS 4 INDEX: FIB4 SCORE: 1.2

## 2024-02-20 ASSESSMENT — PATIENT HEALTH QUESTIONNAIRE - PHQ9: CLINICAL INTERPRETATION OF PHQ2 SCORE: 0

## 2024-02-20 NOTE — ASSESSMENT & PLAN NOTE
This is a chronic problem.  Patient is here for refill of his medications.  He still tries not to use it on his day off and only when he has to get to sleep to go to work the next day.

## 2024-02-20 NOTE — PROGRESS NOTES
Subjective:     CC: Here for follow-up on his chronic insomnia.    HPI:   Don presents today with the following medical concern:    Insomnia  This is a chronic problem.  Patient is here for refill of his medications.  He still tries not to use it on his day off and only when he has to get to sleep to go to work the next day.    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    biopsy-benign    Papillary carcinoma of thyroid (HCC)     Positive urine drug screen 03/2018    positive THC    Sedative hypnotic or anxiolytic dependence (HCC)        Social History     Tobacco Use    Smoking status: Every Day     Current packs/day: 0.30     Average packs/day: 0.3 packs/day for 41.0 years (12.3 ttl pk-yrs)     Types: Cigarettes    Smokeless tobacco: Never   Vaping Use    Vaping Use: Never used   Substance Use Topics    Alcohol use: No     Comment: none in 12 yrs    Drug use: No       Current Outpatient Medications Ordered in Epic   Medication Sig Dispense Refill    zolpidem (AMBIEN) 10 MG Tab Take 1 Tablet by mouth at bedtime as needed for Sleep for up to 90 days. 90 Tablet 0    amLODIPine (NORVASC) 5 MG Tab TAKE 1 TABLET BY MOUTH EVERY DAY 90 Tablet 3    calcitRIOL (ROCALTROL) 0.25 MCG Cap Take 1 Capsule by mouth 2 times a day. 180 Capsule 2    levothyroxine (SYNTHROID) 75 MCG Tab Take 1 Tablet by mouth every morning on an empty stomach. 90 Tablet 2    diclofenac sodium (VOLTAREN) 1 % Gel Apply 2 g topically 4 times a day as needed (pain). 100 g 3    losartan (COZAAR) 100 MG Tab TAKE 1 TABLET BY MOUTH EVERY DAY 90 Tablet 1    albuterol 108 (90 Base) MCG/ACT Aero Soln inhalation aerosol Inhale 1-2 Puffs every 6 hours as needed for Shortness of Breath. 1 Each 2    rosuvastatin (CRESTOR) 20 MG Tab TAKE 1 TABLET BY MOUTH EVERY DAY IN THE EVENING 90 Tablet 3    Aug  "Betamethasone Dipropionate (DIPROLENE-AF) 0.05 % Cream APPLY A THIN LAYER TO THE AFFECTED AREA TWICE DAILY 30 g 3    MAGNESIUM PO Take  by mouth. Drinking magnesium supplement      Vitamin D, Ergocalciferol, 2000 units Cap Take  by mouth.      Calcium Carb-Cholecalciferol (CALCIUM 600 + D PO) Take  by mouth 2 Times a Day.       No current [facility]-ordered facility-administered medications on file.       Allergies:  Lisinopril and Atorvastatin    Health Maintenance: Completed    ROS:  Gen: no fevers/chills, no changes in weight  Eyes: no changes in vision  ENT: no sore throat, no hearing loss, no bloody nose  Pulm: no sob, no cough  CV: no chest pain, no palpitations  GI: no nausea/vomiting, no diarrhea  : no dysuria  MSk: no myalgias  Skin: no rash  Neuro: no headaches, no numbness/tingling  Heme/Lymph: no easy bruising      Objective:       Exam:  /68 (BP Location: Right arm, Patient Position: Sitting, BP Cuff Size: Adult)   Pulse 90   Temp 37 °C (98.6 °F) (Temporal)   Resp 18   Ht 1.676 m (5' 6\")   Wt 82.1 kg (181 lb)   SpO2 98%   BMI 29.21 kg/m²  Body mass index is 29.21 kg/m².    Gen: Alert and oriented, No apparent distress.  Lungs: Normal effort,   Ext: No clubbing, cyanosis, edema.   Psych: Patient is alert and cooperative.  No unusual thought process expressed.  Insight and judgment is good.    Labs: Recent lab test by Dr. Brock reviewed with patient.  I did tell him that the renal function test look a little worse and he should discuss it with Dr. Brock further.    Assessment & Plan:     58 y.o. male with the following -     1. Primary insomnia  This is a chronic problem.  Medication renewed.  Continue to use as needed.  - zolpidem (AMBIEN) 10 MG Tab; Take 1 Tablet by mouth at bedtime as needed for Sleep for up to 90 days.  Dispense: 90 Tablet; Refill: 0      Return in about 3 months (around 5/20/2024) for annual exam.    Please note that this dictation was created using voice recognition " software. I have made every reasonable attempt to correct obvious errors, but I expect that there are errors of grammar and possibly content that I did not discover before finalizing the note.

## 2024-03-01 RX ORDER — BETAMETHASONE DIPROPIONATE 0.5 MG/G
CREAM TOPICAL
Qty: 30 G | Refills: 3 | Status: SHIPPED | OUTPATIENT
Start: 2024-03-01

## 2024-03-01 NOTE — TELEPHONE ENCOUNTER
Received request via: Pharmacy    Was the patient seen in the last year in this department? Yes    Does the patient have an active prescription (recently filled or refills available) for medication(s) requested? No    Pharmacy Name:   [facility]/pharmacy #[id number] - [place], [place] - [address] [place] [facility]. [phone]             Does the patient have residential Plus and need 100 day supply (blood pressure, diabetes and cholesterol meds only)? Patient does not have SCP

## 2024-03-13 NOTE — TELEPHONE ENCOUNTER
Was the patient seen in the last year in this department? Yes    Does patient have an active prescription for medications requested? Yes    Received Request Via: Patient   Comment: Curette was used Render Risk Assessment In Note?: no Detail Level: Generalized

## 2024-04-15 ENCOUNTER — OFFICE VISIT (OUTPATIENT)
Dept: URGENT CARE | Facility: PHYSICIAN GROUP | Age: 59
End: 2024-04-15
Payer: COMMERCIAL

## 2024-04-15 VITALS
RESPIRATION RATE: 16 BRPM | HEIGHT: 66 IN | BODY MASS INDEX: 27.32 KG/M2 | SYSTOLIC BLOOD PRESSURE: 112 MMHG | HEART RATE: 75 BPM | DIASTOLIC BLOOD PRESSURE: 64 MMHG | TEMPERATURE: 98.2 F | WEIGHT: 170 LBS | OXYGEN SATURATION: 100 %

## 2024-04-15 DIAGNOSIS — K42.9 UMBILICAL HERNIA WITHOUT OBSTRUCTION AND WITHOUT GANGRENE: ICD-10-CM

## 2024-04-15 PROCEDURE — 3074F SYST BP LT 130 MM HG: CPT

## 2024-04-15 PROCEDURE — 3078F DIAST BP <80 MM HG: CPT

## 2024-04-15 PROCEDURE — 99214 OFFICE O/P EST MOD 30 MIN: CPT

## 2024-04-15 ASSESSMENT — ENCOUNTER SYMPTOMS
FEVER: 0
ABDOMINAL PAIN: 1
NAUSEA: 0
VOMITING: 0
CONSTIPATION: 0
DIARRHEA: 0

## 2024-04-15 ASSESSMENT — FIBROSIS 4 INDEX: FIB4 SCORE: 1.2

## 2024-04-15 NOTE — PROGRESS NOTES
Subjective:     CHIEF COMPLAINT  Chief Complaint   Patient presents with    Abdominal Pain     X 1 day with abdominal swelling       HPI  Don Olivas Jr. is a very pleasant 58 y.o. male who presents with localized swelling and discomfort surrounding his umbilicus that started yesterday.  He reports that prior to the onset of his symptoms he had been lifting heavy tiles last week.  He denies any other injuries or events preceding the onset of his symptoms.  He has not had any nausea or vomiting and has been having normal bowel movements.  He has not had any fevers and is otherwise feeling well at this time.    REVIEW OF SYSTEMS  Review of Systems   Constitutional:  Negative for fever.   Gastrointestinal:  Positive for abdominal pain. Negative for constipation, diarrhea, nausea and vomiting.       PAST MEDICAL HISTORY  Patient Active Problem List    Diagnosis Date Noted    Lumbar radiculopathy 11/07/2023    Muscle cramp 05/22/2023    Trigger finger of right thumb 05/22/2023    Trigger ring finger of left hand 10/26/2022    Postsurgical hypoparathyroidism (HCC) 06/28/2022    Sedative, hypnotic or anxiolytic dependence (HCC)     Vitamin D deficiency 10/29/2015    Essential hypertension 07/13/2015    Parotid gland enlargement 06/24/2015    ASTHMA 01/09/2013    Postsurgical hypothyroidism 10/24/2012    Insomnia     History of thyroid cancer        SURGICAL HISTORY   has a past surgical history that includes thyroidectomy total (2007) and colonoscopy (05/08/2018).    ALLERGIES  Allergies   Allergen Reactions    Lisinopril      Cough    Atorvastatin Unspecified     cramps       CURRENT MEDICATIONS  Home Medications       Reviewed by Mady Boucher P.A.-C. (Physician Assistant) on 04/15/24 at 1503  Med List Status: <None>     Medication Last Dose Status   albuterol 108 (90 Base) MCG/ACT Aero Soln inhalation aerosol PRN Active   amLODIPine (NORVASC) 5 MG Tab Taking Active   Aug Betamethasone Dipropionate  "(DIPROLENE-AF) 0.05 % Cream Taking Active   calcitRIOL (ROCALTROL) 0.25 MCG Cap Taking Active   Calcium Carb-Cholecalciferol (CALCIUM 600 + D PO) Taking Active   diclofenac sodium (VOLTAREN) 1 % Gel  Active   levothyroxine (SYNTHROID) 75 MCG Tab Taking Active   losartan (COZAAR) 100 MG Tab Taking Active   MAGNESIUM PO Taking Active   rosuvastatin (CRESTOR) 20 MG Tab Taking Active   Vitamin D, Ergocalciferol, 2000 units Cap Taking Active   zolpidem (AMBIEN) 10 MG Tab PRN Active                    SOCIAL HISTORY  Social History     Tobacco Use    Smoking status: Every Day     Current packs/day: 0.30     Average packs/day: 0.3 packs/day for 41.0 years (12.3 ttl pk-yrs)     Types: Cigarettes    Smokeless tobacco: Never   Vaping Use    Vaping Use: Never used   Substance and Sexual Activity    Alcohol use: No     Comment: none in 12 yrs    Drug use: No    Sexual activity: Yes     Partners: Female       FAMILY HISTORY  Family History   Problem Relation Age of Onset    Thyroid Sister         s/p thyroid surgery    Thyroid Sister           Objective:     VITAL SIGNS: /64 (BP Location: Left arm, Patient Position: Sitting, BP Cuff Size: Adult long)   Pulse 75   Temp 36.8 °C (98.2 °F) (Temporal)   Resp 16   Ht 1.676 m (5' 6\")   Wt 77.1 kg (170 lb)   SpO2 100%   BMI 27.44 kg/m²     PHYSICAL EXAM  Physical Exam  Vitals reviewed.   Constitutional:       General: He is not in acute distress.     Appearance: Normal appearance. He is not ill-appearing, toxic-appearing or diaphoretic.   HENT:      Head: Normocephalic and atraumatic.      Mouth/Throat:      Mouth: Mucous membranes are moist.   Eyes:      Conjunctiva/sclera: Conjunctivae normal.   Pulmonary:      Effort: Pulmonary effort is normal. No respiratory distress.   Abdominal:      General: Abdomen is flat. Bowel sounds are normal. There is no distension.      Palpations: Abdomen is soft. There is no mass.      Tenderness: There is abdominal tenderness. There is no " guarding or rebound.      Hernia: A hernia is present. Hernia is present in the umbilical area.          Comments: Umbilical hernia present on physical exam.  Mild tenderness to palpation present.  No erythema or bruising.  Hernia is able to be manually reduced.   Skin:     General: Skin is warm and dry.      Coloration: Skin is not jaundiced.      Findings: No bruising or erythema.   Neurological:      Mental Status: He is alert.         Assessment/Plan:     1. Umbilical hernia without obstruction and without gangrene  - Referral to General Surgery  -Tylenol over-the-counter as needed for discomfort  -If symptoms acutely worsen, be seen in emergency department  -Warm compress to abdomen for comfort    MDM/Comments:  I have prepared for this visit by personally reviewing the patient's prevous medical records, vitals, and labs including: most recent CMP and GFR of 51. Patient has stable vital signs and is non-toxic appearing.  Patient has evidence of a nonobstructed umbilical hernia on physical exam.  A referral has been placed to general surgery.  Discussed signs and symptoms of an obstruction with the patient and when to be seen in the emergency department.  Discussed supportive care with hydration, rest, Tylenol/Ibuprofen as needed. Patient demonstrated understanding of treatment plan at this time and will RTC/ER if symptoms worsen or fail to resolve.       Differential diagnosis, natural history, supportive care, and indications for immediate follow-up discussed. All questions answered. Patient agrees with the plan of care.    Follow-up as needed if symptoms worsen or fail to improve to PCP, Urgent care or Emergency Room.    I have personally reviewed prior external notes and test results pertinent to today's visit.  I have independently reviewed and interpreted all diagnostics ordered during this urgent care acute visit.   Discussed management options (risks,benefits, and alternatives to treatment). Pt expresses  understanding and the treatment plan was agreed upon. Questions were encouraged and answered to pt's satisfaction.    Please note that this dictation was created using voice recognition software. I have made a reasonable attempt to correct obvious errors, but I expect that there are errors of grammar and possibly content that I did not discover before finalizing the note.

## 2024-04-22 ENCOUNTER — OFFICE VISIT (OUTPATIENT)
Dept: MEDICAL GROUP | Facility: PHYSICIAN GROUP | Age: 59
End: 2024-04-22
Payer: COMMERCIAL

## 2024-04-22 VITALS
WEIGHT: 175.4 LBS | HEIGHT: 66 IN | BODY MASS INDEX: 28.19 KG/M2 | SYSTOLIC BLOOD PRESSURE: 122 MMHG | DIASTOLIC BLOOD PRESSURE: 68 MMHG | RESPIRATION RATE: 16 BRPM | OXYGEN SATURATION: 98 % | HEART RATE: 80 BPM | TEMPERATURE: 98.8 F

## 2024-04-22 DIAGNOSIS — K42.9 UMBILICAL HERNIA WITHOUT OBSTRUCTION AND WITHOUT GANGRENE: ICD-10-CM

## 2024-04-22 DIAGNOSIS — F51.01 PRIMARY INSOMNIA: ICD-10-CM

## 2024-04-22 PROCEDURE — 3074F SYST BP LT 130 MM HG: CPT | Performed by: FAMILY MEDICINE

## 2024-04-22 PROCEDURE — 99213 OFFICE O/P EST LOW 20 MIN: CPT | Performed by: FAMILY MEDICINE

## 2024-04-22 PROCEDURE — 3078F DIAST BP <80 MM HG: CPT | Performed by: FAMILY MEDICINE

## 2024-04-22 RX ORDER — GLIMEPIRIDE 2 MG/1
TABLET ORAL
COMMUNITY
Start: 2024-04-17

## 2024-04-22 RX ORDER — LATANOPROST 50 UG/ML
1 SOLUTION/ DROPS OPHTHALMIC
COMMUNITY
Start: 2024-03-20

## 2024-04-22 ASSESSMENT — FIBROSIS 4 INDEX: FIB4 SCORE: 1.2

## 2024-04-22 NOTE — PROGRESS NOTES
Subjective:     CC: Here for couple of issues.    HPI:   Don presents today with the following medical concerns:    Umbilical hernia without obstruction and without gangrene  This is a new problem.  Patient started having discomfort and swelling around his umbilicus about 8 or 9 days ago.  He did go to urgent care who confirmed it was a hernia and they put in a referral to surgery clinic for him.  He has not gotten a call back from them as of yet.  He is asking what can he do in regards to his work.    Insomnia  This is a chronic problem.  At times this time today I could renew his Ambien when it comes due next month.  Next visit will be 3 months from today.    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    biopsy-benign    Papillary carcinoma of thyroid (HCC)     Positive urine drug screen 03/2018    positive THC    Sedative hypnotic or anxiolytic dependence (HCC)        Social History     Tobacco Use    Smoking status: Every Day     Current packs/day: 0.30     Average packs/day: 0.3 packs/day for 41.0 years (12.3 ttl pk-yrs)     Types: Cigarettes    Smokeless tobacco: Never   Vaping Use    Vaping Use: Never used   Substance Use Topics    Alcohol use: No     Comment: none in 12 yrs    Drug use: No       Current Outpatient Medications Ordered in Epic   Medication Sig Dispense Refill    timolol (TIMOPTIC) 0.25 % Solution INSTILL 1 DROP INTO BOTH EYES EVERY MORNING AS DIRECTED      latanoprost (XALATAN) 0.005 % Solution Administer 1 Drop into both eyes at bedtime. Instill 1 drop into both eyes every night at bedtime      Aug Betamethasone Dipropionate (DIPROLENE-AF) 0.05 % Cream APPLY A THIN LAYER TO THE AFFECTED AREA TWICE DAILY 30 g 3    zolpidem (AMBIEN) 10 MG Tab Take 1 Tablet by mouth at bedtime as needed for Sleep for up to 90 days. 90 Tablet 0     "amLODIPine (NORVASC) 5 MG Tab TAKE 1 TABLET BY MOUTH EVERY DAY 90 Tablet 3    calcitRIOL (ROCALTROL) 0.25 MCG Cap Take 1 Capsule by mouth 2 times a day. 180 Capsule 2    levothyroxine (SYNTHROID) 75 MCG Tab Take 1 Tablet by mouth every morning on an empty stomach. 90 Tablet 2    diclofenac sodium (VOLTAREN) 1 % Gel Apply 2 g topically 4 times a day as needed (pain). 100 g 3    losartan (COZAAR) 100 MG Tab TAKE 1 TABLET BY MOUTH EVERY DAY 90 Tablet 1    albuterol 108 (90 Base) MCG/ACT Aero Soln inhalation aerosol Inhale 1-2 Puffs every 6 hours as needed for Shortness of Breath. 1 Each 2    rosuvastatin (CRESTOR) 20 MG Tab TAKE 1 TABLET BY MOUTH EVERY DAY IN THE EVENING 90 Tablet 3    MAGNESIUM PO Take  by mouth. Drinking magnesium supplement      Vitamin D, Ergocalciferol, 2000 units Cap Take  by mouth.      Calcium Carb-Cholecalciferol (CALCIUM 600 + D PO) Take  by mouth 2 Times a Day.       No current [facility]-ordered facility-administered medications on file.       Allergies:  Lisinopril and Atorvastatin    Health Maintenance: Completed    ROS:  Gen: no fevers/chills, no changes in weight  Eyes: no changes in vision  ENT: no sore throat, no hearing loss, no bloody nose  Pulm: no sob, no cough  CV: no chest pain, no palpitations  GI: no nausea/vomiting, no diarrhea  : no dysuria  MSk: no myalgias  Skin: no rash  Neuro: no headaches, no numbness/tingling  Heme/Lymph: no easy bruising      Objective:       Exam:  /68 (BP Location: Right arm, Patient Position: Sitting, BP Cuff Size: Adult)   Pulse 80   Temp 37.1 °C (98.8 °F) (Temporal)   Resp 16   Ht 1.676 m (5' 6\")   Wt 79.6 kg (175 lb 6.4 oz)   SpO2 98%   BMI 28.31 kg/m²  Body mass index is 28.31 kg/m².    Gen: Alert and oriented, No apparent distress.  Lungs: Normal effort,   Abdomen: Patient does have a soft reducible umbilical hernia.  It is nontender to palpation.  Ext: No clubbing, cyanosis, edema.      Assessment & Plan:     58 y.o. male with the " following -     1. Umbilical hernia without obstruction and without gangrene  This is a new problem.  I told him to make sure he calls a surgical clinic to get in so he can be evaluated and have this repaired.  We went over possible repair methods.  He was given a work note stating he should not be lifting more than 20 to 25 pounds until he sees the surgeon and has this done.    2. Primary insomnia  This is a chronic problem.  Patient will call next month when he needs his refill on his Ambien.  Follow-up in clinic 3 months.      Return in 3 months (on 7/22/2024), or if symptoms worsen or fail to improve, for Long.    Please note that this dictation was created using voice recognition software. I have made every reasonable attempt to correct obvious errors, but I expect that there are errors of grammar and possibly content that I did not discover before finalizing the note.

## 2024-04-22 NOTE — LETTER
[address] [address]     April 22, 2024    Patient: Don Olivas Jr.   YOB: 1965   Date of Visit: 4/22/2024       To Whom It May Concern:    Don Olivas was seen and treated in our department on 4/22/2024. He should be on lighter duty and no lifting more than 20-25 pounds until seen by a surgeon to have it repaired.    Sincerely,     Gurwinder Rosario III, M.D.

## 2024-04-22 NOTE — ASSESSMENT & PLAN NOTE
This is a chronic problem.  At times this time today I could renew his Ambien when it comes due next month.  Next visit will be 3 months from today.

## 2024-04-22 NOTE — ASSESSMENT & PLAN NOTE
This is a new problem.  Patient started having discomfort and swelling around his umbilicus about 8 or 9 days ago.  He did go to urgent care who confirmed it was a hernia and they put in a referral to surgery clinic for him.  He has not gotten a call back from them as of yet.  He is asking what can he do in regards to his work.

## 2024-04-23 DIAGNOSIS — I10 ESSENTIAL HYPERTENSION: ICD-10-CM

## 2024-04-23 RX ORDER — LOSARTAN POTASSIUM 100 MG/1
TABLET ORAL
Qty: 90 TABLET | Refills: 1 | Status: SHIPPED | OUTPATIENT
Start: 2024-04-23

## 2024-04-25 NOTE — TELEPHONE ENCOUNTER
Received request via: Pharmacy    Was the patient seen in the last year in this department? Yes    Does the patient have an active prescription (recently filled or refills available) for medication(s) requested? No    Pharmacy Name:     [facility]/pharmacy #[id number] - [place], [place] - [address].       Does the patient have FPC Plus and need 100 day supply (blood pressure, diabetes and cholesterol meds only)? Patient does not have SCP

## 2024-05-20 DIAGNOSIS — E89.0 POSTOPERATIVE HYPOTHYROIDISM: ICD-10-CM

## 2024-05-20 RX ORDER — LEVOTHYROXINE SODIUM 0.07 MG/1
75 TABLET ORAL
Qty: 90 TABLET | Refills: 2 | Status: SHIPPED | OUTPATIENT
Start: 2024-05-20

## 2024-05-25 DIAGNOSIS — F51.01 PRIMARY INSOMNIA: ICD-10-CM

## 2024-05-28 RX ORDER — ZOLPIDEM TARTRATE 10 MG/1
10 TABLET ORAL NIGHTLY PRN
Qty: 60 TABLET | Refills: 0 | Status: SHIPPED | OUTPATIENT
Start: 2024-05-28 | End: 2024-07-27

## 2024-05-28 NOTE — TELEPHONE ENCOUNTER
Received request via: Patient    Was the patient seen in the last year in this department? Yes    Does the patient have an active prescription (recently filled or refills available) for medication(s) requested? No    Pharmacy Name: : [facility]/pharmacy #[id number] - [place], [place] - [address]     Does the patient have custodial Plus and need 100 day supply (blood pressure, diabetes and cholesterol meds only)? Patient does not have SCP

## 2024-07-01 ENCOUNTER — APPOINTMENT (OUTPATIENT)
Dept: ENDOCRINOLOGY | Facility: MEDICAL CENTER | Age: 59
End: 2024-07-01
Attending: INTERNAL MEDICINE
Payer: COMMERCIAL

## 2024-07-01 VITALS
BODY MASS INDEX: 27.88 KG/M2 | DIASTOLIC BLOOD PRESSURE: 66 MMHG | HEART RATE: 68 BPM | OXYGEN SATURATION: 96 % | WEIGHT: 173.5 LBS | HEIGHT: 66 IN | SYSTOLIC BLOOD PRESSURE: 126 MMHG

## 2024-07-01 DIAGNOSIS — E55.9 VITAMIN D DEFICIENCY: ICD-10-CM

## 2024-07-01 DIAGNOSIS — E89.0 POSTOPERATIVE HYPOTHYROIDISM: ICD-10-CM

## 2024-07-01 DIAGNOSIS — E89.2 POSTSURGICAL HYPOPARATHYROIDISM (HCC): ICD-10-CM

## 2024-07-01 DIAGNOSIS — C73 PAPILLARY CARCINOMA OF THYROID (HCC): ICD-10-CM

## 2024-07-01 PROCEDURE — 3078F DIAST BP <80 MM HG: CPT | Performed by: INTERNAL MEDICINE

## 2024-07-01 PROCEDURE — 99214 OFFICE O/P EST MOD 30 MIN: CPT | Performed by: INTERNAL MEDICINE

## 2024-07-01 PROCEDURE — 3074F SYST BP LT 130 MM HG: CPT | Performed by: INTERNAL MEDICINE

## 2024-07-01 PROCEDURE — 99211 OFF/OP EST MAY X REQ PHY/QHP: CPT | Performed by: INTERNAL MEDICINE

## 2024-07-01 RX ORDER — LEVOTHYROXINE SODIUM 0.07 MG/1
75 TABLET ORAL
Qty: 90 TABLET | Refills: 3 | Status: SHIPPED | OUTPATIENT
Start: 2024-07-01

## 2024-07-01 ASSESSMENT — FIBROSIS 4 INDEX: FIB4 SCORE: 1.2

## 2024-07-30 RX ORDER — ROSUVASTATIN CALCIUM 20 MG/1
20 TABLET, COATED ORAL EVERY EVENING
Qty: 90 TABLET | Refills: 0 | Status: SHIPPED | OUTPATIENT
Start: 2024-07-30

## 2024-08-16 DIAGNOSIS — I10 ESSENTIAL HYPERTENSION: ICD-10-CM

## 2024-08-17 RX ORDER — LOSARTAN POTASSIUM 100 MG/1
TABLET ORAL
Qty: 90 TABLET | Refills: 1 | Status: SHIPPED | OUTPATIENT
Start: 2024-08-17

## 2024-09-09 ENCOUNTER — APPOINTMENT (OUTPATIENT)
Dept: MEDICAL GROUP | Facility: PHYSICIAN GROUP | Age: 59
End: 2024-09-09
Payer: COMMERCIAL

## 2024-09-09 VITALS
DIASTOLIC BLOOD PRESSURE: 68 MMHG | RESPIRATION RATE: 16 BRPM | HEIGHT: 66 IN | OXYGEN SATURATION: 99 % | WEIGHT: 173 LBS | BODY MASS INDEX: 27.8 KG/M2 | TEMPERATURE: 97.9 F | SYSTOLIC BLOOD PRESSURE: 122 MMHG | HEART RATE: 60 BPM

## 2024-09-09 DIAGNOSIS — F51.01 PRIMARY INSOMNIA: ICD-10-CM

## 2024-09-09 DIAGNOSIS — I10 ESSENTIAL HYPERTENSION: ICD-10-CM

## 2024-09-09 PROBLEM — K42.9 UMBILICAL HERNIA WITHOUT OBSTRUCTION AND WITHOUT GANGRENE: Status: RESOLVED | Noted: 2024-04-22 | Resolved: 2024-09-09

## 2024-09-09 PROBLEM — R25.2 MUSCLE CRAMP: Status: RESOLVED | Noted: 2023-05-22 | Resolved: 2024-09-09

## 2024-09-09 PROCEDURE — 3074F SYST BP LT 130 MM HG: CPT | Performed by: FAMILY MEDICINE

## 2024-09-09 PROCEDURE — 3078F DIAST BP <80 MM HG: CPT | Performed by: FAMILY MEDICINE

## 2024-09-09 PROCEDURE — 99213 OFFICE O/P EST LOW 20 MIN: CPT | Performed by: FAMILY MEDICINE

## 2024-09-09 RX ORDER — ZOLPIDEM TARTRATE 10 MG/1
10 TABLET ORAL NIGHTLY PRN
Qty: 90 TABLET | Refills: 0 | Status: SHIPPED | OUTPATIENT
Start: 2024-09-09 | End: 2024-12-08

## 2024-09-09 ASSESSMENT — FIBROSIS 4 INDEX: FIB4 SCORE: 1.2

## 2024-09-09 NOTE — ASSESSMENT & PLAN NOTE
This is a chronic stable condition.  Patient is due for some labs I have ordered earlier.  He was reminded to do that.  Will also add on his cholesterol panel.

## 2024-09-09 NOTE — PROGRESS NOTES
Subjective:     CC: Here for follow-up on a couple of issues and medication refill.    HPI:   Don presents today with the following medical concern:    Insomnia  Patient is here today for refill of his medication.  He continues to work well for him.    Essential hypertension  This is a chronic stable condition.  Patient is due for some labs I have ordered earlier.  He was reminded to do that.  Will also add on his cholesterol panel.    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    biopsy-benign    Papillary carcinoma of thyroid (HCC)     Positive urine drug screen 03/2018    positive THC    Sedative hypnotic or anxiolytic dependence (HCC)        Social History     Tobacco Use    Smoking status: Every Day     Current packs/day: 0.30     Average packs/day: 0.3 packs/day for 41.0 years (12.3 ttl pk-yrs)     Types: Cigarettes    Smokeless tobacco: Never   Vaping Use    Vaping status: Never Used   Substance Use Topics    Alcohol use: No     Comment: none in 12 yrs    Drug use: No       Current Outpatient Medications Ordered in Epic   Medication Sig Dispense Refill    zolpidem (AMBIEN) 10 MG Tab Take 1 Tablet by mouth at bedtime as needed for Sleep for up to 90 days. 90 Tablet 0    losartan (COZAAR) 100 MG Tab TAKE 1 TABLET BY MOUTH EVERY DAY 90 Tablet 1    rosuvastatin (CRESTOR) 20 MG Tab TAKE 1 TABLET BY MOUTH EVERY DAY IN THE EVENING 90 Tablet 0    levothyroxine (SYNTHROID) 75 MCG Tab Take 1 Tablet by mouth every morning on an empty stomach. 90 Tablet 3    diclofenac sodium (VOLTAREN) 1 % Gel APPLY 2 G TOPICALLY 4 TIMES A DAY AS NEEDED (PAIN). 100 g 3    timolol (TIMOPTIC) 0.25 % Solution INSTILL 1 DROP INTO BOTH EYES EVERY MORNING AS DIRECTED      latanoprost (XALATAN) 0.005 % Solution Administer 1 Drop into both eyes at bedtime. Instill 1 drop into both  "eyes every night at bedtime      Aug Betamethasone Dipropionate (DIPROLENE-AF) 0.05 % Cream APPLY A THIN LAYER TO THE AFFECTED AREA TWICE DAILY 30 g 3    amLODIPine (NORVASC) 5 MG Tab TAKE 1 TABLET BY MOUTH EVERY DAY 90 Tablet 3    calcitRIOL (ROCALTROL) 0.25 MCG Cap Take 1 Capsule by mouth 2 times a day. 180 Capsule 2    albuterol 108 (90 Base) MCG/ACT Aero Soln inhalation aerosol Inhale 1-2 Puffs every 6 hours as needed for Shortness of Breath. 1 Each 2    MAGNESIUM PO Take  by mouth. Drinking magnesium supplement      Vitamin D, Ergocalciferol, 2000 units Cap Take  by mouth.      Calcium Carb-Cholecalciferol (CALCIUM 600 + D PO) Take  by mouth 2 Times a Day.       No current [facility]-ordered facility-administered medications on file.       Allergies:  Lisinopril and Atorvastatin    Health Maintenance: Completed    ROS:  Gen: no fevers/chills, no changes in weight  Eyes: no changes in vision  ENT: no sore throat, no hearing loss, no bloody nose  Pulm: no sob, no cough  CV: no chest pain, no palpitations  GI: no nausea/vomiting, no diarrhea  : no dysuria  MSk: no myalgias  Skin: no rash  Neuro: no headaches, no numbness/tingling  Heme/Lymph: no easy bruising      Objective:       Exam:  /68 (BP Location: Right arm, Patient Position: Sitting, BP Cuff Size: Adult)   Pulse 60   Temp 36.6 °C (97.9 °F) (Temporal)   Resp 16   Ht 1.676 m (5' 6\")   Wt 78.5 kg (173 lb)   SpO2 99%   BMI 27.92 kg/m²  Body mass index is 27.92 kg/m².    Gen: Alert and oriented, No apparent distress.  Lungs: Normal effort,  Ext: No clubbing, cyanosis, edema.  Psych: Patient is alert and cooperative.  No unusual thought present depressed.  Insight and judgment is good.     Labs: Ordered    Assessment & Plan:     58 y.o. male with the following -     1. Primary insomnia  This is a chronic problem.  Medication renewed.  Follow-up in 3 months.  - zolpidem (AMBIEN) 10 MG Tab; Take 1 Tablet by mouth at bedtime as needed for Sleep for up to " 90 days.  Dispense: 90 Tablet; Refill: 0    2. Essential hypertension  This is a chronic stable condition.  Blood pressure is well-controlled.  Cholesterol panel ordered.  - Lipid Profile; Future      Return in about 3 months (around 12/9/2024) for Long.    Please note that this dictation was created using voice recognition software. I have made every reasonable attempt to correct obvious errors, but I expect that there are errors of grammar and possibly content that I did not discover before finalizing the note.

## 2024-09-10 ENCOUNTER — HOSPITAL ENCOUNTER (OUTPATIENT)
Dept: LAB | Facility: MEDICAL CENTER | Age: 59
End: 2024-09-10
Attending: FAMILY MEDICINE
Payer: COMMERCIAL

## 2024-09-10 DIAGNOSIS — I10 ESSENTIAL HYPERTENSION: ICD-10-CM

## 2024-09-10 LAB
CHOLEST SERPL-MCNC: 148 MG/DL (ref 100–199)
FASTING STATUS PATIENT QL REPORTED: NORMAL
HDLC SERPL-MCNC: 59 MG/DL
LDLC SERPL CALC-MCNC: 64 MG/DL
TRIGL SERPL-MCNC: 126 MG/DL (ref 0–149)

## 2024-09-10 PROCEDURE — 36415 COLL VENOUS BLD VENIPUNCTURE: CPT

## 2024-09-10 PROCEDURE — 80061 LIPID PANEL: CPT

## 2024-10-23 DIAGNOSIS — I10 ESSENTIAL HYPERTENSION: ICD-10-CM

## 2024-10-23 RX ORDER — AMLODIPINE BESYLATE 5 MG/1
5 TABLET ORAL DAILY
Qty: 90 TABLET | Refills: 3 | Status: SHIPPED | OUTPATIENT
Start: 2024-10-23

## 2024-11-06 RX ORDER — ROSUVASTATIN CALCIUM 20 MG/1
20 TABLET, COATED ORAL EVERY EVENING
Qty: 90 TABLET | Refills: 3 | Status: SHIPPED | OUTPATIENT
Start: 2024-11-06

## 2024-11-06 NOTE — TELEPHONE ENCOUNTER
Received request via: Pharmacy    Was the patient seen in the last year in this department? Yes    Does the patient have an active prescription (recently filled or refills available) for medication(s) requested? No    Pharmacy Name:   [facility]/pharmacy #[id number] - [place] - [address]  [address]  [place] [address]  Phone: [phone] Fax: [phone]        Does the patient have halfway Plus and need 100-day supply? (This applies to ALL medications) Patient does not have SCP

## 2024-12-10 ENCOUNTER — OFFICE VISIT (OUTPATIENT)
Dept: MEDICAL GROUP | Facility: PHYSICIAN GROUP | Age: 59
End: 2024-12-10
Payer: COMMERCIAL

## 2024-12-10 VITALS
HEIGHT: 66 IN | RESPIRATION RATE: 22 BRPM | WEIGHT: 175 LBS | DIASTOLIC BLOOD PRESSURE: 80 MMHG | OXYGEN SATURATION: 96 % | HEART RATE: 84 BPM | BODY MASS INDEX: 28.12 KG/M2 | TEMPERATURE: 98.5 F | SYSTOLIC BLOOD PRESSURE: 124 MMHG

## 2024-12-10 DIAGNOSIS — M17.12 PRIMARY OSTEOARTHRITIS OF LEFT KNEE: ICD-10-CM

## 2024-12-10 DIAGNOSIS — N18.31 CHRONIC RENAL FAILURE, STAGE 3A: ICD-10-CM

## 2024-12-10 DIAGNOSIS — Z12.5 PROSTATE CANCER SCREENING: ICD-10-CM

## 2024-12-10 DIAGNOSIS — F51.01 PRIMARY INSOMNIA: ICD-10-CM

## 2024-12-10 PROCEDURE — 99213 OFFICE O/P EST LOW 20 MIN: CPT | Performed by: FAMILY MEDICINE

## 2024-12-10 PROCEDURE — 3079F DIAST BP 80-89 MM HG: CPT | Performed by: FAMILY MEDICINE

## 2024-12-10 PROCEDURE — 3074F SYST BP LT 130 MM HG: CPT | Performed by: FAMILY MEDICINE

## 2024-12-10 RX ORDER — ZOLPIDEM TARTRATE 10 MG/1
10 TABLET ORAL NIGHTLY PRN
Qty: 90 TABLET | Refills: 0 | Status: SHIPPED | OUTPATIENT
Start: 2024-12-10 | End: 2025-03-10

## 2024-12-10 ASSESSMENT — FIBROSIS 4 INDEX: FIB4 SCORE: 1.22

## 2024-12-10 NOTE — PROGRESS NOTES
Subjective:     CC: Here for medication issues and a couple of other concerns.    HPI:   Don presents today with the following medical concerns:    Insomnia  This is a chronic problem.  Patient is here for refill on his medications.  He continues to work well for him.    Primary osteoarthritis of left knee  This is a chronic problem.  He did see his orthopedist who told him at some point knee surgery for replacement would be needed.  He did get a steroid shot and it worked very well for the last 2 months.  He is going to continue on those for now.  He had previously been using NSAIDs as Tylenol does not work but he developed some renal failure.  We need to continue to monitor that use.    Chronic renal failure, stage 3a  This is a chronic problem.  Patient was vies to continue to avoid NSAIDs.  On rare occasions he could try using them but we do need to follow-up on his lab work.  He indicated he would wait till he does not labs in July for Dr. Brock and then he will recheck his kidney test.    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    biopsy-benign    Papillary carcinoma of thyroid (HCC)     Positive urine drug screen 03/2018    positive THC    Sedative hypnotic or anxiolytic dependence (HCC)        Social History     Tobacco Use    Smoking status: Every Day     Current packs/day: 0.30     Average packs/day: 0.3 packs/day for 41.0 years (12.3 ttl pk-yrs)     Types: Cigarettes    Smokeless tobacco: Never   Vaping Use    Vaping status: Never Used   Substance Use Topics    Alcohol use: No     Comment: none in 12 yrs    Drug use: No       Current Outpatient Medications Ordered in Epic   Medication Sig Dispense Refill    diclofenac sodium (VOLTAREN) 1 % Gel Apply 2 g topically 4 times a day as needed (pain). 100 g 3    zolpidem (AMBIEN) 10 MG Tab Take  "1 Tablet by mouth at bedtime as needed for Sleep for up to 90 days. 90 Tablet 0    rosuvastatin (CRESTOR) 20 MG Tab TAKE 1 TABLET BY MOUTH EVERY DAY IN THE EVENING 90 Tablet 3    amLODIPine (NORVASC) 5 MG Tab TAKE 1 TABLET BY MOUTH EVERY DAY 90 Tablet 3    losartan (COZAAR) 100 MG Tab TAKE 1 TABLET BY MOUTH EVERY DAY 90 Tablet 1    levothyroxine (SYNTHROID) 75 MCG Tab Take 1 Tablet by mouth every morning on an empty stomach. 90 Tablet 3    timolol (TIMOPTIC) 0.25 % Solution INSTILL 1 DROP INTO BOTH EYES EVERY MORNING AS DIRECTED      latanoprost (XALATAN) 0.005 % Solution Administer 1 Drop into both eyes at bedtime. Instill 1 drop into both eyes every night at bedtime      Aug Betamethasone Dipropionate (DIPROLENE-AF) 0.05 % Cream APPLY A THIN LAYER TO THE AFFECTED AREA TWICE DAILY 30 g 3    calcitRIOL (ROCALTROL) 0.25 MCG Cap Take 1 Capsule by mouth 2 times a day. 180 Capsule 2    albuterol 108 (90 Base) MCG/ACT Aero Soln inhalation aerosol Inhale 1-2 Puffs every 6 hours as needed for Shortness of Breath. 1 Each 2    MAGNESIUM PO Take  by mouth. Drinking magnesium supplement      Vitamin D, Ergocalciferol, 2000 units Cap Take  by mouth.      Calcium Carb-Cholecalciferol (CALCIUM 600 + D PO) Take  by mouth 2 Times a Day.       No current [facility]-ordered facility-administered medications on file.       Allergies:  Lisinopril and Atorvastatin    Health Maintenance: Completed    ROS:  Gen: no fevers/chills, no changes in weight  Eyes: no changes in vision  ENT: no sore throat, no hearing loss, no bloody nose  Pulm: no sob, no cough  CV: no chest pain, no palpitations  GI: no nausea/vomiting, no diarrhea  : no dysuria  MSk: no myalgias  Skin: no rash  Neuro: no headaches, no numbness/tingling  Heme/Lymph: no easy bruising      Objective:       Exam:  /80   Pulse 84   Temp 36.9 °C (98.5 °F) (Temporal)   Resp (!) 22   Ht 1.676 m (5' 6\")   Wt 79.4 kg (175 lb)   SpO2 96%   BMI 28.25 kg/m²  Body mass index is " 28.25 kg/m².    Gen: Alert and oriented, No apparent distress.  Lungs: Normal effort,   Ext: No clubbing, cyanosis, edema.  Normal range of motion left knee without pain.  Psych: Patient is alert and cooperative.  No unusual thought Zen expressed.  Insight and judgment is good.      Labs: Reviewed and ordered    Assessment & Plan:     59 y.o. male with the following -     1. Primary insomnia  This is a chronic problem.  Medication renewed.  At his next visit he will need a new form signed.  - zolpidem (AMBIEN) 10 MG Tab; Take 1 Tablet by mouth at bedtime as needed for Sleep for up to 90 days.  Dispense: 90 Tablet; Refill: 0  - CBC WITHOUT DIFFERENTIAL; Future  - URINALYSIS,CULTURE IF INDICATED; Future  - ESTIMATED GFR; Future    2. Prostate cancer screening  This is a screening issue.  Lab ordered.  - PROSTATE SPECIFIC AG SCREENING; Future    3. Chronic renal failure, stage 3a  This is a chronic problem.  Continue to avoid NSAIDs as much as possible.  Recheck labs with his next set done through his endocrinologist    4. Primary osteoarthritis of left knee  This is a chronic problem.  Continue care through orthopedics.      Return in about 3 months (around 3/10/2025) for Long.    Please note that this dictation was created using voice recognition software. I have made every reasonable attempt to correct obvious errors, but I expect that there are errors of grammar and possibly content that I did not discover before finalizing the note.

## 2024-12-10 NOTE — ASSESSMENT & PLAN NOTE
This is a chronic problem.  Patient is here for refill on his medications.  He continues to work well for him.

## 2024-12-10 NOTE — ASSESSMENT & PLAN NOTE
This is a chronic problem.  Patient was vies to continue to avoid NSAIDs.  On rare occasions he could try using them but we do need to follow-up on his lab work.  He indicated he would wait till he does not labs in July for Dr. Brock and then he will recheck his kidney test.

## 2024-12-10 NOTE — ASSESSMENT & PLAN NOTE
This is a chronic problem.  He did see his orthopedist who told him at some point knee surgery for replacement would be needed.  He did get a steroid shot and it worked very well for the last 2 months.  He is going to continue on those for now.  He had previously been using NSAIDs as Tylenol does not work but he developed some renal failure.  We need to continue to monitor that use.

## 2025-01-17 DIAGNOSIS — E89.2 POSTSURGICAL HYPOPARATHYROIDISM (HCC): ICD-10-CM

## 2025-01-21 RX ORDER — CALCITRIOL 0.25 UG/1
0.25 CAPSULE, LIQUID FILLED ORAL 2 TIMES DAILY
Qty: 180 CAPSULE | Refills: 2 | Status: SHIPPED | OUTPATIENT
Start: 2025-01-21

## 2025-03-05 RX ORDER — ALBUTEROL SULFATE 90 UG/1
1-2 INHALANT RESPIRATORY (INHALATION) EVERY 6 HOURS PRN
COMMUNITY

## 2025-03-10 ENCOUNTER — HOSPITAL ENCOUNTER (OUTPATIENT)
Facility: MEDICAL CENTER | Age: 60
End: 2025-03-10
Attending: INTERNAL MEDICINE
Payer: COMMERCIAL

## 2025-03-10 ENCOUNTER — OFFICE VISIT (OUTPATIENT)
Dept: MEDICAL GROUP | Facility: PHYSICIAN GROUP | Age: 60
End: 2025-03-10
Payer: COMMERCIAL

## 2025-03-10 VITALS
DIASTOLIC BLOOD PRESSURE: 74 MMHG | HEIGHT: 66 IN | TEMPERATURE: 98 F | RESPIRATION RATE: 16 BRPM | BODY MASS INDEX: 29.35 KG/M2 | SYSTOLIC BLOOD PRESSURE: 120 MMHG | HEART RATE: 72 BPM | OXYGEN SATURATION: 99 % | WEIGHT: 182.6 LBS

## 2025-03-10 DIAGNOSIS — J45.20 MILD INTERMITTENT ASTHMA WITHOUT COMPLICATION: ICD-10-CM

## 2025-03-10 DIAGNOSIS — F51.01 PRIMARY INSOMNIA: ICD-10-CM

## 2025-03-10 DIAGNOSIS — Z51.81 THERAPEUTIC DRUG MONITORING: ICD-10-CM

## 2025-03-10 DIAGNOSIS — I10 ESSENTIAL HYPERTENSION: ICD-10-CM

## 2025-03-10 DIAGNOSIS — E89.0 POSTSURGICAL HYPOTHYROIDISM: ICD-10-CM

## 2025-03-10 DIAGNOSIS — E78.5 DYSLIPIDEMIA: ICD-10-CM

## 2025-03-10 PROCEDURE — 80307 DRUG TEST PRSMV CHEM ANLYZR: CPT

## 2025-03-10 PROCEDURE — 3074F SYST BP LT 130 MM HG: CPT | Performed by: INTERNAL MEDICINE

## 2025-03-10 PROCEDURE — 3078F DIAST BP <80 MM HG: CPT | Performed by: INTERNAL MEDICINE

## 2025-03-10 PROCEDURE — 99214 OFFICE O/P EST MOD 30 MIN: CPT | Performed by: INTERNAL MEDICINE

## 2025-03-10 RX ORDER — ZOLPIDEM TARTRATE 10 MG/1
10 TABLET ORAL NIGHTLY PRN
Qty: 60 TABLET | Refills: 0 | Status: SHIPPED | OUTPATIENT
Start: 2025-03-10 | End: 2025-05-09

## 2025-03-10 ASSESSMENT — FIBROSIS 4 INDEX: FIB4 SCORE: 1.22

## 2025-03-10 ASSESSMENT — PATIENT HEALTH QUESTIONNAIRE - PHQ9: CLINICAL INTERPRETATION OF PHQ2 SCORE: 0

## 2025-03-10 NOTE — ASSESSMENT & PLAN NOTE
This is a chronic condition.  The patient has been taking Ambien 10 mg at bedtime.  The patient requests Rx refill.  Patient denies significant side effects.    In prescribing controlled substances to this patient, I certify that I have obtained and reviewed the medical history of the patient. I have also made a good abdoul effort to obtain applicable records from other providers who have treated the patient.     I have reviewed patient's prescription history as maintained by the [place] Prescription Monitoring Program.      I have reviewed the medical records and have determined that a controlled substance treatment is medically indicated. I believe the benefits of controlled substance therapy outweigh the risks.      I have discussed the risks and benefits of treatment plan, and alternative therapies with the patient.  Also discussed with the patient regarding potential drug interactions with other medications.     Patient signed consent form    UDS requested

## 2025-03-10 NOTE — ASSESSMENT & PLAN NOTE
Chronic condition.  The patient is taking rosuvastatin.  Tolerating medication well.  No significant side effects reported.

## 2025-03-10 NOTE — ASSESSMENT & PLAN NOTE
Chronic condition.  The patient is taking amlodipine and losartan.  Patient denies chest pain shortness of breath or palpitation.

## 2025-03-10 NOTE — ASSESSMENT & PLAN NOTE
This is a chronic condition.  Patient currently taking levothyroxine.  No significant side effects reported.  Patient followed by PCP and endocrinology service

## 2025-03-10 NOTE — PROGRESS NOTES
PRIMARY CARE CLINIC VISIT        Chief Complaint   Patient presents with    Medication Refill      Insomnia.    Refill Ambien  Follow-up hypertension  Hypothyroidism  Asthma  Hyperlipidemia      Pcp  Gurwinder Rosario III, M.D.      History of Present Illness     Insomnia  This is a chronic condition.  The patient has been taking Ambien 10 mg at bedtime.  The patient requests Rx refill.  Patient denies significant side effects.    In prescribing controlled substances to this patient, I certify that I have obtained and reviewed the medical history of the patient. I have also made a good abdoul effort to obtain applicable records from other providers who have treated the patient.     I have reviewed patient's prescription history as maintained by the [place] Prescription Monitoring Program.      I have reviewed the medical records and have determined that a controlled substance treatment is medically indicated. I believe the benefits of controlled substance therapy outweigh the risks.      I have discussed the risks and benefits of treatment plan, and alternative therapies with the patient.  Also discussed with the patient regarding potential drug interactions with other medications.     Patient signed consent form    UDS requested     Postsurgical hypothyroidism  This is a chronic condition.  Patient currently taking levothyroxine.  No significant side effects reported.  Patient followed by PCP and endocrinology service    Essential hypertension  Chronic condition.  The patient is taking amlodipine and losartan.  Patient denies chest pain shortness of breath or palpitation.    Mild intermittent asthma without complication  Chronic condition.  The patient uses albuterol as needed.  Currently the patient asymptomatic.    Dyslipidemia  Chronic condition.  The patient is taking rosuvastatin.  Tolerating medication well.  No significant side effects reported.    Current Outpatient Medications on File Prior to Visit    Medication Sig Dispense Refill    calcitRIOL (ROCALTROL) 0.25 MCG Cap TAKE 1 CAPSULE BY MOUTH TWICE A  Capsule 2    diclofenac sodium (VOLTAREN) 1 % Gel Apply 2 g topically 4 times a day as needed (pain). 100 g 3    zolpidem (AMBIEN) 10 MG Tab Take 1 Tablet by mouth at bedtime as needed for Sleep for up to 90 days. 90 Tablet 0    rosuvastatin (CRESTOR) 20 MG Tab TAKE 1 TABLET BY MOUTH EVERY DAY IN THE EVENING 90 Tablet 3    amLODIPine (NORVASC) 5 MG Tab TAKE 1 TABLET BY MOUTH EVERY DAY 90 Tablet 3    losartan (COZAAR) 100 MG Tab TAKE 1 TABLET BY MOUTH EVERY DAY 90 Tablet 1    levothyroxine (SYNTHROID) 75 MCG Tab Take 1 Tablet by mouth every morning on an empty stomach. 90 Tablet 3    timolol (TIMOPTIC) 0.25 % Solution INSTILL 1 DROP INTO BOTH EYES EVERY MORNING AS DIRECTED      latanoprost (XALATAN) 0.005 % Solution Administer 1 Drop into both eyes at bedtime. Instill 1 drop into both eyes every night at bedtime      Aug Betamethasone Dipropionate (DIPROLENE-AF) 0.05 % Cream APPLY A THIN LAYER TO THE AFFECTED AREA TWICE DAILY 30 g 3    albuterol 108 (90 Base) MCG/ACT Aero Soln inhalation aerosol Inhale 1-2 Puffs every 6 hours as needed for Shortness of Breath. 1 Each 2    MAGNESIUM PO Take  by mouth. Drinking magnesium supplement      Vitamin D, Ergocalciferol, 2000 units Cap Take  by mouth.      Calcium Carb-Cholecalciferol (CALCIUM 600 + D PO) Take  by mouth 2 Times a Day.      albuterol 108 (90 Base) MCG/ACT Aero Soln inhalation aerosol Take 1-2 Puffs by mouth every 6 hours as needed. (Patient not taking: Reported on 3/10/2025)       No current facility-administered medications on file prior to visit.        Allergies: Lisinopril and Atorvastatin    Current Outpatient Medications Ordered in Epic   Medication Sig Dispense Refill    zolpidem (AMBIEN) 10 MG Tab Take 1 Tablet by mouth at bedtime as needed for Sleep for up to 60 days. 60 Tablet 0    calcitRIOL (ROCALTROL) 0.25 MCG Cap TAKE 1 CAPSULE BY MOUTH  TWICE A  Capsule 2    diclofenac sodium (VOLTAREN) 1 % Gel Apply 2 g topically 4 times a day as needed (pain). 100 g 3    zolpidem (AMBIEN) 10 MG Tab Take 1 Tablet by mouth at bedtime as needed for Sleep for up to 90 days. 90 Tablet 0    rosuvastatin (CRESTOR) 20 MG Tab TAKE 1 TABLET BY MOUTH EVERY DAY IN THE EVENING 90 Tablet 3    amLODIPine (NORVASC) 5 MG Tab TAKE 1 TABLET BY MOUTH EVERY DAY 90 Tablet 3    losartan (COZAAR) 100 MG Tab TAKE 1 TABLET BY MOUTH EVERY DAY 90 Tablet 1    levothyroxine (SYNTHROID) 75 MCG Tab Take 1 Tablet by mouth every morning on an empty stomach. 90 Tablet 3    timolol (TIMOPTIC) 0.25 % Solution INSTILL 1 DROP INTO BOTH EYES EVERY MORNING AS DIRECTED      latanoprost (XALATAN) 0.005 % Solution Administer 1 Drop into both eyes at bedtime. Instill 1 drop into both eyes every night at bedtime      Aug Betamethasone Dipropionate (DIPROLENE-AF) 0.05 % Cream APPLY A THIN LAYER TO THE AFFECTED AREA TWICE DAILY 30 g 3    albuterol 108 (90 Base) MCG/ACT Aero Soln inhalation aerosol Inhale 1-2 Puffs every 6 hours as needed for Shortness of Breath. 1 Each 2    MAGNESIUM PO Take  by mouth. Drinking magnesium supplement      Vitamin D, Ergocalciferol, 2000 units Cap Take  by mouth.      Calcium Carb-Cholecalciferol (CALCIUM 600 + D PO) Take  by mouth 2 Times a Day.      albuterol 108 (90 Base) MCG/ACT Aero Soln inhalation aerosol Take 1-2 Puffs by mouth every 6 hours as needed. (Patient not taking: Reported on 3/10/2025)       No current [facility]-ordered facility-administered medications on file.       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    biopsy-benign    Papillary carcinoma of thyroid (HCC)     Positive urine drug screen 03/2018    positive THC    Sedative hypnotic or anxiolytic dependence (HCC)        Past Surgical History:  "  Procedure Laterality Date    HERNIA REPAIR  07/10/2024    ventral hernia repair    COLONOSCOPY  05/08/2018    mild divertoculosis sigmoid colon, int hemorrhoids    THYROIDECTOMY TOTAL  2007    due to thyroid cancer       Family History   Problem Relation Age of Onset    Thyroid Sister         s/p thyroid surgery    Thyroid Sister        Social History     Tobacco Use   Smoking Status Every Day    Current packs/day: 0.30    Average packs/day: 0.3 packs/day for 41.0 years (12.3 ttl pk-yrs)    Types: Cigarettes   Smokeless Tobacco Never       Social History     Substance and Sexual Activity   Alcohol Use No    Comment: none in 12 yrs       Review of systems  As per HPI above. All other systems reviewed and negative.      Past Medical, Social, and Family history reviewed and updated in [facility]       LAB DATA:     I have independently reviewed / interpreted labs    No results found for: \"HBA1C\"     Lab Results   Component Value Date/Time    WBC 6.1 05/23/2023 06:38 AM    HEMOGLOBIN 16.3 05/23/2023 06:38 AM    HEMATOCRIT 47.4 05/23/2023 06:38 AM    MCV 92.2 05/23/2023 06:38 AM    PLATELETCT 173 05/23/2023 06:38 AM       Lab Results   Component Value Date/Time    CHOLSTRLTOT 148 09/10/2024 07:14 AM    TRIGLYCERIDE 126 09/10/2024 07:14 AM    HDL 59 09/10/2024 07:14 AM    LDL 64 09/10/2024 07:14 AM       Lab Results   Component Value Date/Time    ALTSGPT 26 02/06/2024 06:15 AM          Objective     /74 (BP Location: Right arm, Patient Position: Sitting, BP Cuff Size: Adult)   Pulse 72   Temp 36.7 °C (98 °F) (Temporal)   Resp 16   Ht 1.676 m (5' 6\")   Wt 82.8 kg (182 lb 9.6 oz)   SpO2 99%    Body mass index is 29.47 kg/m².    General: alert in no apparent distress.  Cardiovascular: regular rate and rhythm  Pulmonary: lungs : no wheezing   Gastrointestinal: BS present.       Assessment and Plan     1. Primary insomnia  Chronic condition.  Stable.  The patient requests refill of Ambien.  Potential side effect of " medication discussed with the patient.  The patient signed consent form today.  UDS requested.  - Controlled Substance Treatment Agreement  - zolpidem (AMBIEN) 10 MG Tab; Take 1 Tablet by mouth at bedtime as needed for Sleep for up to 60 days.  Dispense: 60 Tablet; Refill: 0    Informed the patient that this will be a one-time refill.  He needs to follow-up with his PCP for future refills.  The patient voiced understanding.    2. Therapeutic drug monitoring  - PAIN MANAGEMENT PANEL, SCRN W/ RFLX TO QNT; Future    3. Essential hypertension  Chronic stable.  Continue amlodipine 5 mg daily and losartan 100 mg daily    4. Postsurgical hypothyroidism  Chronic condition.  Continue levothyroxine 75 mcg daily.  Patient to continue follow-up with endocrinology and PCP    5. Mild intermittent asthma without complication  Stable.  Patient currently asymptomatic.  Patient may take albuterol as needed for rescue treatment    6. Dyslipidemia  Chronic condition stable.  Lipid panel normal.  Continue rosuvastatin 20 Mg daily                Please note that this dictation was created using voice recognition software. I have made every reasonable attempt to correct obvious errors, but I expect that there are errors of grammar and possibly content that I did not discover before finalizing the note.    Freddy Ghosh MD  Internal Medicine  [facility]

## 2025-03-12 LAB
AMPHET CTO UR CFM-MCNC: NEGATIVE NG/ML
BARBITURATES CTO UR CFM-MCNC: NEGATIVE NG/ML
BENZODIAZ CTO UR CFM-MCNC: NEGATIVE NG/ML
BUPRENORPHINE UR-MCNC: NEGATIVE NG/ML
CANNABINOIDS CTO UR CFM-MCNC: NEGATIVE NG/ML
CARISOPRODOL UR-MCNC: NEGATIVE NG/ML
COCAINE CTO UR CFM-MCNC: NEGATIVE NG/ML
CREAT UR-MCNC: 47.6 MG/DL (ref 20–400)
DRUG SCREEN COMMENT UR-IMP: NORMAL
ETHYL GLUCURONIDE UR QL SCN: NEGATIVE NG/ML
FENTANYL UR-MCNC: NEGATIVE NG/ML
MEPERIDINE CTO UR SCN-MCNC: NEGATIVE NG/ML
METHADONE CTO UR CFM-MCNC: NEGATIVE NG/ML
OPIATES UR QL SCN: NEGATIVE NG/ML
OXYCDOXYM URSCRN 2005102: NEGATIVE NG/ML
PCP CTO UR CFM-MCNC: NEGATIVE NG/ML
PROPOXYPH CTO UR CFM-MCNC: NEGATIVE NG/ML
TAPENTADOL UR-MCNC: NEGATIVE NG/ML
TRAMADOL CTO UR SCN-MCNC: NEGATIVE NG/ML
ZOLPIDEM UR-MCNC: NEGATIVE NG/ML

## 2025-03-31 ENCOUNTER — APPOINTMENT (OUTPATIENT)
Dept: MEDICAL GROUP | Facility: PHYSICIAN GROUP | Age: 60
End: 2025-03-31
Payer: COMMERCIAL

## 2025-05-05 ENCOUNTER — OFFICE VISIT (OUTPATIENT)
Dept: MEDICAL GROUP | Facility: PHYSICIAN GROUP | Age: 60
End: 2025-05-05
Payer: COMMERCIAL

## 2025-05-05 VITALS
OXYGEN SATURATION: 97 % | HEIGHT: 66 IN | WEIGHT: 184 LBS | BODY MASS INDEX: 29.57 KG/M2 | RESPIRATION RATE: 16 BRPM | HEART RATE: 72 BPM | TEMPERATURE: 98.4 F | DIASTOLIC BLOOD PRESSURE: 66 MMHG | SYSTOLIC BLOOD PRESSURE: 120 MMHG

## 2025-05-05 DIAGNOSIS — N52.8 OTHER MALE ERECTILE DYSFUNCTION: ICD-10-CM

## 2025-05-05 DIAGNOSIS — F51.01 PRIMARY INSOMNIA: ICD-10-CM

## 2025-05-05 DIAGNOSIS — Z00.00 WELLNESS EXAMINATION: ICD-10-CM

## 2025-05-05 DIAGNOSIS — F13.20 SEDATIVE, HYPNOTIC OR ANXIOLYTIC DEPENDENCE (HCC): ICD-10-CM

## 2025-05-05 PROCEDURE — 99396 PREV VISIT EST AGE 40-64: CPT | Performed by: FAMILY MEDICINE

## 2025-05-05 PROCEDURE — 3078F DIAST BP <80 MM HG: CPT | Performed by: FAMILY MEDICINE

## 2025-05-05 PROCEDURE — 3074F SYST BP LT 130 MM HG: CPT | Performed by: FAMILY MEDICINE

## 2025-05-05 RX ORDER — VARDENAFIL HYDROCHLORIDE 20 MG/1
TABLET ORAL
Qty: 10 TABLET | Refills: 3 | Status: SHIPPED | OUTPATIENT
Start: 2025-05-05

## 2025-05-05 RX ORDER — ZOLPIDEM TARTRATE 10 MG/1
10 TABLET ORAL NIGHTLY PRN
Qty: 90 TABLET | Refills: 0 | Status: SHIPPED | OUTPATIENT
Start: 2025-05-15 | End: 2025-08-13

## 2025-05-05 ASSESSMENT — FIBROSIS 4 INDEX: FIB4 SCORE: 1.22

## 2025-05-05 NOTE — PROGRESS NOTES
Subjective:     CC: Here for his annual exam and medication refill.    HPI:   Don presents today with the following medical concerns:    Wellness examination  Patient is here for his annual exam.  He has not done his labs as of yet but will do those soon as he has some from the endocrinologist to do.  Overall he states he is doing very well.  He does have some issues with the ED had like to discuss.    Sedative, hypnotic or anxiolytic dependence (CMS-HCC) (HCC)  This is a chronic issue.  Patient is here for refill of his Ambien.  When last seen by different provider as I saw was not available a drug screen was done.  He was negative for Ambien but he had been off of it for 2 weeks as he had run out.  He states he did not sleep well during that time.    Other male erectile dysfunction  This is a new problem.  Patient been having trouble getting and maintaining an erection.  Would like to try medication.    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    biopsy-benign    Papillary carcinoma of thyroid (HCC)     Positive urine drug screen 03/2018    positive THC    Sedative hypnotic or anxiolytic dependence ([facility])        Social History     Tobacco Use    Smoking status: Every Day     Current packs/day: 0.30     Average packs/day: 0.3 packs/day for 41.0 years (12.3 ttl pk-yrs)     Types: Cigarettes    Smokeless tobacco: Never   Vaping Use    Vaping status: Never Used   Substance Use Topics    Alcohol use: No     Comment: none in 12 yrs    Drug use: No       Current Outpatient Medications Ordered in Epic   Medication Sig Dispense Refill    Cyanocobalamin (B-12 PO) Take  by mouth.      vardenafil (LEVITRA) 20 MG tablet Take 1/2-1 p.o. before sexual activity.  Limit to once per day. 10 Tablet 3    [START ON 5/15/2025] zolpidem (AMBIEN) 10 MG Tab Take 1 Tablet by  mouth at bedtime as needed for Sleep for up to 90 days. 90 Tablet 0    calcitRIOL (ROCALTROL) 0.25 MCG Cap TAKE 1 CAPSULE BY MOUTH TWICE A  Capsule 2    diclofenac sodium (VOLTAREN) 1 % Gel Apply 2 g topically 4 times a day as needed (pain). 100 g 3    rosuvastatin (CRESTOR) 20 MG Tab TAKE 1 TABLET BY MOUTH EVERY DAY IN THE EVENING 90 Tablet 3    amLODIPine (NORVASC) 5 MG Tab TAKE 1 TABLET BY MOUTH EVERY DAY 90 Tablet 3    losartan (COZAAR) 100 MG Tab TAKE 1 TABLET BY MOUTH EVERY DAY 90 Tablet 1    levothyroxine (SYNTHROID) 75 MCG Tab Take 1 Tablet by mouth every morning on an empty stomach. 90 Tablet 3    MAGNESIUM PO Take  by mouth. Drinking magnesium supplement      Calcium Carb-Cholecalciferol (CALCIUM 600 + D PO) Take  by mouth 2 Times a Day.      timolol (TIMOPTIC) 0.25 % Solution INSTILL 1 DROP INTO BOTH EYES EVERY MORNING AS DIRECTED      latanoprost (XALATAN) 0.005 % Solution Administer 1 Drop into both eyes at bedtime. Instill 1 drop into both eyes every night at bedtime      Aug Betamethasone Dipropionate (DIPROLENE-AF) 0.05 % Cream APPLY A THIN LAYER TO THE AFFECTED AREA TWICE DAILY 30 g 3    albuterol 108 (90 Base) MCG/ACT Aero Soln inhalation aerosol Inhale 1-2 Puffs every 6 hours as needed for Shortness of Breath. 1 Each 2    Vitamin D, Ergocalciferol, 2000 units Cap Take  by mouth.       No current Epic-ordered facility-administered medications on file.       Allergies:  Lisinopril and Atorvastatin    Health Maintenance: Completed    ROS:  Gen: no fevers/chills, no changes in weight  Eyes: no changes in vision  ENT: no sore throat, no hearing loss, no bloody nose  Pulm: no sob, no cough  CV: no chest pain, no palpitations  GI: no nausea/vomiting, no diarrhea  : no dysuria  MSk: no myalgias  Skin: no rash  Neuro: no headaches, no numbness/tingling  Heme/Lymph: no easy bruising      Objective:       Exam:  /66 (BP Location: Left arm, Patient Position: Sitting, BP Cuff Size: Adult)    "Pulse 72   Temp 36.9 °C (98.4 °F) (Temporal)   Resp 16   Ht 1.676 m (5' 6\")   Wt 83.5 kg (184 lb)   SpO2 97%   BMI 29.70 kg/m²  Body mass index is 29.7 kg/m².    Gen: Alert and oriented, No apparent distress.  Eyes:   Extraocular motions intact.  No scleral icterus seen.  Ears:    Ear canals and TMs are clear.  Neck: Neck is supple without lymphadenopathy.  Lungs: Normal effort, CTA bilaterally, no wheezes, rhonchi, or rales  CV: Regular rate and rhythm. No murmurs, rubs, or gallops.  No carotid bruits heard  Abdomen: Soft, nontender, organomegaly or masses.  Extremities: No clubbing, cyanosis or edema.  Neuro: Cranial nerves II through VIII are grossly intact.  No lateralized signs are seen.  Gait is normal.  Psych: Patient is alert and cooperative.  No unusual thought was expressed.  Insight and judgment is good.    Labs: Patient reminded to do the labs.    Assessment & Plan:     59 y.o. male with the following -     1. Sedative, hypnotic or anxiolytic dependence (HCC)  This is a chronic problem.  His agreement is in place.  Medication renewed.  Follow-up in 3 months.    2. Primary insomnia  This chronic problem.  Follow-up in 3 months.  - zolpidem (AMBIEN) 10 MG Tab; Take 1 Tablet by mouth at bedtime as needed for Sleep for up to 90 days.  Dispense: 90 Tablet; Refill: 0    3. Wellness examination  Patient's exam was performed.  General health issues addressed.  Will notify him when we get the results of his lab test.    4. Other male erectile dysfunction  This is a new issue.  We initially talked about Viagra.  After he left and I tried to put it into his insurance apparently requires preauthorization for that.  But they allowed Levitra.  A prescription for that was sent and patient was notified.  If that does not work he can let me know and we will try Cialis.    Anticipatory guidance: Patient talked about his screening lab work as well as medications for ED.  Return in about 3 months (around 8/5/2025) for " Long.    Please note that this dictation was created using voice recognition software. I have made every reasonable attempt to correct obvious errors, but I expect that there are errors of grammar and possibly content that I did not discover before finalizing the note.

## 2025-05-05 NOTE — ASSESSMENT & PLAN NOTE
Patient is here for his annual exam.  He has not done his labs as of yet but will do those soon as he has some from the endocrinologist to do.  Overall he states he is doing very well.  He does have some issues with the ED had like to discuss.

## 2025-05-05 NOTE — ASSESSMENT & PLAN NOTE
This is a new problem.  Patient been having trouble getting and maintaining an erection.  Would like to try medication.

## 2025-05-05 NOTE — ASSESSMENT & PLAN NOTE
This is a chronic issue.  Patient is here for refill of his Ambien.  When last seen by different provider as I saw was not available a drug screen was done.  He was negative for Ambien but he had been off of it for 2 weeks as he had run out.  He states he did not sleep well during that time.

## 2025-06-09 ENCOUNTER — HOSPITAL ENCOUNTER (OUTPATIENT)
Dept: LAB | Facility: MEDICAL CENTER | Age: 60
End: 2025-06-09
Attending: INTERNAL MEDICINE
Payer: COMMERCIAL

## 2025-06-09 ENCOUNTER — HOSPITAL ENCOUNTER (OUTPATIENT)
Dept: LAB | Facility: MEDICAL CENTER | Age: 60
End: 2025-06-09
Attending: FAMILY MEDICINE
Payer: COMMERCIAL

## 2025-06-09 DIAGNOSIS — E89.2 POSTSURGICAL HYPOPARATHYROIDISM (HCC): ICD-10-CM

## 2025-06-09 DIAGNOSIS — C73 PAPILLARY CARCINOMA OF THYROID (HCC): ICD-10-CM

## 2025-06-09 DIAGNOSIS — F51.01 PRIMARY INSOMNIA: ICD-10-CM

## 2025-06-09 DIAGNOSIS — E55.9 VITAMIN D DEFICIENCY: ICD-10-CM

## 2025-06-09 DIAGNOSIS — Z12.5 PROSTATE CANCER SCREENING: ICD-10-CM

## 2025-06-09 DIAGNOSIS — E89.0 POSTOPERATIVE HYPOTHYROIDISM: ICD-10-CM

## 2025-06-09 LAB
25(OH)D3 SERPL-MCNC: 36 NG/ML (ref 30–100)
ALBUMIN SERPL BCP-MCNC: 4.3 G/DL (ref 3.2–4.9)
ALBUMIN/GLOB SERPL: 1.6 G/DL
ALP SERPL-CCNC: 62 U/L (ref 30–99)
ALT SERPL-CCNC: 25 U/L (ref 2–50)
ANION GAP SERPL CALC-SCNC: 11 MMOL/L (ref 7–16)
APPEARANCE UR: CLEAR
AST SERPL-CCNC: 31 U/L (ref 12–45)
BILIRUB SERPL-MCNC: 0.6 MG/DL (ref 0.1–1.5)
BILIRUB UR QL STRIP.AUTO: NEGATIVE
BUN SERPL-MCNC: 20 MG/DL (ref 8–22)
CALCIUM ALBUM COR SERPL-MCNC: 9 MG/DL (ref 8.5–10.5)
CALCIUM SERPL-MCNC: 9.2 MG/DL (ref 8.5–10.5)
CHLORIDE SERPL-SCNC: 106 MMOL/L (ref 96–112)
CO2 SERPL-SCNC: 25 MMOL/L (ref 20–33)
COLOR UR: YELLOW
CREAT SERPL-MCNC: 1.44 MG/DL (ref 0.5–1.4)
CREAT SERPL-MCNC: 1.46 MG/DL (ref 0.5–1.4)
ERYTHROCYTE [DISTWIDTH] IN BLOOD BY AUTOMATED COUNT: 42.8 FL (ref 35.9–50)
GFR SERPLBLD CREATININE-BSD FMLA CKD-EPI: 55 ML/MIN/1.73 M 2
GFR SERPLBLD CREATININE-BSD FMLA CKD-EPI: 55 ML/MIN/1.73 M 2
GLOBULIN SER CALC-MCNC: 2.7 G/DL (ref 1.9–3.5)
GLUCOSE SERPL-MCNC: 89 MG/DL (ref 65–99)
GLUCOSE UR STRIP.AUTO-MCNC: NEGATIVE MG/DL
HCT VFR BLD AUTO: 47 % (ref 42–52)
HGB BLD-MCNC: 16.1 G/DL (ref 14–18)
KETONES UR STRIP.AUTO-MCNC: NEGATIVE MG/DL
LEUKOCYTE ESTERASE UR QL STRIP.AUTO: NEGATIVE
MCH RBC QN AUTO: 31.8 PG (ref 27–33)
MCHC RBC AUTO-ENTMCNC: 34.3 G/DL (ref 32.3–36.5)
MCV RBC AUTO: 92.9 FL (ref 81.4–97.8)
MICRO URNS: NORMAL
NITRITE UR QL STRIP.AUTO: NEGATIVE
PH UR STRIP.AUTO: 6.5 [PH] (ref 5–8)
PHOSPHATE SERPL-MCNC: 4.6 MG/DL (ref 2.5–4.5)
PLATELET # BLD AUTO: 165 K/UL (ref 164–446)
PMV BLD AUTO: 11.3 FL (ref 9–12.9)
POTASSIUM SERPL-SCNC: 4.1 MMOL/L (ref 3.6–5.5)
PROT SERPL-MCNC: 7 G/DL (ref 6–8.2)
PROT UR QL STRIP: NEGATIVE MG/DL
PSA SERPL DL<=0.01 NG/ML-MCNC: 0.84 NG/ML (ref 0–4)
RBC # BLD AUTO: 5.06 M/UL (ref 4.7–6.1)
RBC UR QL AUTO: NEGATIVE
SODIUM SERPL-SCNC: 142 MMOL/L (ref 135–145)
SP GR UR STRIP.AUTO: 1.02
T4 FREE SERPL-MCNC: 1.26 NG/DL (ref 0.93–1.7)
TSH SERPL-ACNC: 1.2 UIU/ML (ref 0.38–5.33)
UROBILINOGEN UR STRIP.AUTO-MCNC: 0.2 EU/DL
WBC # BLD AUTO: 6.4 K/UL (ref 4.8–10.8)

## 2025-06-09 PROCEDURE — 86800 THYROGLOBULIN ANTIBODY: CPT

## 2025-06-09 PROCEDURE — 84153 ASSAY OF PSA TOTAL: CPT

## 2025-06-09 PROCEDURE — 80053 COMPREHEN METABOLIC PANEL: CPT

## 2025-06-09 PROCEDURE — 84432 ASSAY OF THYROGLOBULIN: CPT

## 2025-06-09 PROCEDURE — 84439 ASSAY OF FREE THYROXINE: CPT

## 2025-06-09 PROCEDURE — 85027 COMPLETE CBC AUTOMATED: CPT

## 2025-06-09 PROCEDURE — 82565 ASSAY OF CREATININE: CPT

## 2025-06-09 PROCEDURE — 82306 VITAMIN D 25 HYDROXY: CPT

## 2025-06-09 PROCEDURE — 36415 COLL VENOUS BLD VENIPUNCTURE: CPT

## 2025-06-09 PROCEDURE — 84100 ASSAY OF PHOSPHORUS: CPT

## 2025-06-09 PROCEDURE — 84443 ASSAY THYROID STIM HORMONE: CPT

## 2025-06-09 PROCEDURE — 81003 URINALYSIS AUTO W/O SCOPE: CPT

## 2025-06-10 ENCOUNTER — RESULTS FOLLOW-UP (OUTPATIENT)
Dept: MEDICAL GROUP | Facility: PHYSICIAN GROUP | Age: 60
End: 2025-06-10

## 2025-06-11 LAB
THYROGLOB AB SERPL-ACNC: <1.5 IU/ML (ref 0–4)
THYROGLOB SERPL-MCNC: 0.5 NG/ML (ref 1.3–31.8)
THYROGLOB SERPL-MCNC: ABNORMAL NG/ML (ref 1.3–31.8)

## 2025-06-30 ENCOUNTER — OFFICE VISIT (OUTPATIENT)
Dept: ENDOCRINOLOGY | Facility: MEDICAL CENTER | Age: 60
End: 2025-06-30
Attending: INTERNAL MEDICINE
Payer: COMMERCIAL

## 2025-06-30 VITALS
OXYGEN SATURATION: 96 % | DIASTOLIC BLOOD PRESSURE: 76 MMHG | HEART RATE: 82 BPM | BODY MASS INDEX: 28.45 KG/M2 | WEIGHT: 177 LBS | HEIGHT: 66 IN | SYSTOLIC BLOOD PRESSURE: 138 MMHG

## 2025-06-30 DIAGNOSIS — E89.2 POSTSURGICAL HYPOPARATHYROIDISM (HCC): ICD-10-CM

## 2025-06-30 DIAGNOSIS — C73 PAPILLARY CARCINOMA OF THYROID (HCC): ICD-10-CM

## 2025-06-30 DIAGNOSIS — E89.0 POSTOPERATIVE HYPOTHYROIDISM: Primary | ICD-10-CM

## 2025-06-30 DIAGNOSIS — E55.9 VITAMIN D DEFICIENCY: ICD-10-CM

## 2025-06-30 DIAGNOSIS — K11.8 MASS OF LEFT PAROTID GLAND: ICD-10-CM

## 2025-06-30 DIAGNOSIS — R20.2 PARESTHESIA OF LEFT UPPER EXTREMITY: ICD-10-CM

## 2025-06-30 RX ORDER — CALCITRIOL 0.25 UG/1
0.25 CAPSULE, LIQUID FILLED ORAL 2 TIMES DAILY
Qty: 180 CAPSULE | Refills: 3 | Status: SHIPPED | OUTPATIENT
Start: 2025-06-30

## 2025-06-30 RX ORDER — LEVOTHYROXINE SODIUM 75 UG/1
75 TABLET ORAL
Qty: 90 TABLET | Refills: 3 | Status: SHIPPED | OUTPATIENT
Start: 2025-06-30

## 2025-06-30 ASSESSMENT — FIBROSIS 4 INDEX: FIB4 SCORE: 2.25

## 2025-06-30 NOTE — PROGRESS NOTES
CHIEF COMPLAINT: Followup of postsurgical hypothyroidism and papillary thyroid carcinoma.    HPI:   Don Olivas Jr. is a 58 y.o. male, who had initial total thyroidectomy on 6/2007 by Dr. Lindsey with pathology revealing papillary thyroid carcinoma 2.0 cm  Right lobe and 0.3 cm left lobe with no ETE.  He did not have problems with postoperative hypocalcemia.   He was not treated with radioactive iodine immediately post surgery initially but for a remnant ablation dose in 2017 ( see below)    He was seen by Dr. Lovell and Dr. Kang  and then by Dr. Hyatt.    US on 2015 - showed a:  suspicious mass on the right thyroid bed with intermediate echogenicity material in the right thyroid bed measuring 12 x 22 x 13 mm. In the center of this there is an indistinct hypoechoic region measuring 8 x 8 mm. There is internal vascular flow. No calcification.  In the left thyroid bed there is what also appears to represent some thyroid gland measuring 12 x 7 x 6 mm    Biopsy was not done for these masses    It was presumed by his previous endocrinologists that these masses represented residual thyroid tissue and he was referred to Dr. Lindsey for completion thyroidectomy.  However Dr. Lindsey required an FNA for the suspicious masses which never got completed for unclear reasons    The patient then received 50 mCi-131 on 9/2019 under the orders of Dr. Hyatt.  Post treatment scan showed:  3 discrete foci of increased uptake in the lower neck with associated star artifact. This presumably represents residual or recurrent thyroid tissue or disease. Cannot exclude any christiano disease.  Normal activity is seen in the liver, spleen       Follow-up neck US on Jan 2021 showed:   The right lobe of the thyroid gland measures 0.70 cm x 1.22 cm x 0.79 cm. The contour and echogenicity are normal. No focal mass lesions are identified.   The left lobe of the thyroid gland measures 0.62 cm x 0.75 cm x 0.46 cm. The contour and  echogenicity are normal. No focal mass lesions are identified.    US on 8/2023 showed no suspicious masses in the neck  There was no mention regarding the previously detected residual thyroid tissue on the thyroid bed       Unstimulated thyroglobulin was 2.2 with negative antibodies in Aug 2019  Unstimulated thyroglobulin was 0.8 with negative antibodies in January 2020  Unstimulated thyroglobulin was 0.5 with negative antibodies in July 2020  Unstimulated thyroglobulin was 0.5 with negative antibodies on January 2021  Unstimulated thyroglobulin was 0.6 with negative antibodies on May 2022  Unstimulated thyroglobulin was 0.4 with negative antibodies on May 2022  Unstimulated thyroglobulin was 0.5 with negative antibodies on Nov 2023  Unstimulated thyroglobulin was 0.5 with negative antibodies on Feb 2024  Unstimulated thyroglobulin was 0.5 with negative antibodies on June 2025        Since last visit, he has remained on Levothyroxine 75 micrograms daily, which has been his dose since 24 months.  He is feeling well.  Energy level has been excellent.  Weight is Stable.  No palpitations, no frequent diarrhea, or frequent constipation.  No heat or cold intolerance.  No anterior neck symptoms or problems.  No voice changes.      TSH is stable   Latest Reference Range & Units 06/09/25 07:10   TSH 0.380 - 5.330 uIU/mL 1.200   Free T-4 0.93 - 1.70 ng/dL 1.26   Thyroglobulin by LC-MC/MS-S/P 1.3 - 31.8 ng/mL Not Applicable   Thyroglobulin 1.3 - 31.8 ng/mL 0.5 (L)   Anti-Thyroglobulin 0.0 - 4.0 IU/mL <1.5             He has Postsurgical hypoparathyroidism.  At baseline, his ionized calcium was 1.0 with a magnesium of 2.1 vitamin D 53 and low PTH of 12 on 3/16/2022 compatible with hypoparathyroidism    He is now on Calcitriol 0.50mg  bid and calcium 600mg bid    He was on Calcitriol 0,25 mcg bid but he self increased his calcitriol to 0.5mcg bid due to unilateral paresthesias going down from his forearm rising up to his neck on  the left side    This is usually triggered when he sleeps and lies on right sided    His calcium kassidy from 8.2 to 9.2 on 6/2025        Aside from this the patient has mass on the left parotid gland seen on ultrasound.    It was first discovered in 2007.  It originally measured 15 x 13 x 12 mm and on ultrasound in 2015 it measured 22 x 20 x 11 mm  Repeat CT scan on April 25, 2022 showed a 1.9 x 1.0 x 1.3 cm calcified lesion on the left parotid gland without apparent internal enhancement.  Incidentally the patient underwent core needle biopsy of this lesion which was nondiagnostic.  He was  evaluated by Dr. Chevalier at [facility].   He reports he has no follow up with ENT but he was told the parotid mass is benign.       Patient's medications, allergies, and social histories were reviewed and updated as appropriate.      ROS:      CONS:     No fever, no chills   EYES:     No diplopia, no blurry vision   CV:           No chest pain, no palpitations   PULM:     No SOB, no cough, no hemoptysis.   GI:            No nausea, no vomiting, no diarrhea, no constipation   ENDO:     No polyuria, no polydipsia, no heat intolerance, no cold intolerance       Past Medical History:  Problem List:  2025-05: Wellness examination  2025-05: Other male erectile dysfunction  2025-03: Mild intermittent asthma without complication  2025-03: Dyslipidemia  2024-12: Chronic renal failure, stage 3a  2024-12: Primary osteoarthritis of left knee  2024-04: Umbilical hernia without obstruction and without gangrene  2023-11: Lumbar radiculopathy  2023-05: Wellness examination  2023-05: Muscle cramp  2023-05: Trigger finger of right thumb  2022-10: Trigger ring finger of left hand  2022-10: Encounter to establish care  2022-06: Postsurgical hypoparathyroidism (HCC)  2022-05: Strain of left trapezius muscle  2020-09: Right knee pain  2015-10: Vitamin D deficiency  2015-07: Essential hypertension  2015-06: Parotid gland enlargement  2015-04: HTN  "(hypertension)  2013-01: ASTHMA  2012-10: Postsurgical hypothyroidism  Hypothyroidism  Insomnia  History of thyroid cancer  HTN (hypertension)  Sedative, hypnotic or anxiolytic dependence (HCC)      Past Surgical History:  Past Surgical History:   Procedure Laterality Date    HERNIA REPAIR  07/10/2024    ventral hernia repair    COLONOSCOPY  05/08/2018    mild divertoculosis sigmoid colon, int hemorrhoids    THYROIDECTOMY TOTAL  2007    due to thyroid cancer        Allergies:  Lisinopril     Social History:  Social History     Tobacco Use    Smoking status: Every Day     Current packs/day: 0.30     Average packs/day: 0.3 packs/day for 41.0 years (12.3 ttl pk-yrs)     Types: Cigarettes    Smokeless tobacco: Never   Vaping Use    Vaping status: Never Used   Substance Use Topics    Alcohol use: No     Comment: none in 12 yrs    Drug use: No        Family History:   family history includes Thyroid in his sister and sister.      PHYSICAL EXAM:   Vital signs: /76 (BP Location: Left arm, Patient Position: Sitting, BP Cuff Size: Adult)   Pulse 82   Ht 1.676 m (5' 6\")   Wt 80.3 kg (177 lb)   SpO2 96%   BMI 28.57 kg/m²   GENERAL: Well-developed, well-nourished in no apparent distress.   EYE:  No ocular asymmetry, PERRLA  HENT: Pink, moist mucous membranes.    NECK: Well healed transverse scar, Thyroid is surgically absent   There is a palpable firm movable mass measuring more than 2 cm located inferior to the left parotid gland just below the angle of the jaw.  CARDIOVASCULAR:  No murmurs  LUNGS: Clear breath sounds  ABDOMEN: Soft, nontender   EXTREMITIES: No clubbing, cyanosis, or edema.   NEUROLOGICAL: No gross focal motor abnormalities   LYMPH: No cervical adenopathy palpated.   SKIN: No rashes, lesions.       Labs:  Lab Results   Component Value Date/Time    WBC 6.4 06/09/2025 07:10 AM    RBC 5.06 06/09/2025 07:10 AM    HEMOGLOBIN 16.1 06/09/2025 07:10 AM    MCV 92.9 06/09/2025 07:10 AM    MCH 31.8 06/09/2025 " 07:10 AM    MCHC 34.3 2025 07:10 AM    RDW 42.8 2025 07:10 AM    MPV 11.3 2025 07:10 AM       Lab Results   Component Value Date/Time    SODIUM 142 2025 07:10 AM    POTASSIUM 4.1 2025 07:10 AM    CHLORIDE 106 2025 07:10 AM    CO2 25 2025 07:10 AM    ANION 11.0 2025 07:10 AM    GLUCOSE 89 2025 07:10 AM    BUN 20 2025 07:10 AM    CREATININE 1.44 (H) 2025 07:10 AM    CREATININE 1.46 (H) 2025 07:10 AM    CREATININE 1.0 2007 08:25 AM    CALCIUM 9.2 2025 07:10 AM    ASTSGOT 31 2025 07:10 AM    ALTSGPT 25 2025 07:10 AM    TBILIRUBIN 0.6 2025 07:10 AM    ALBUMIN 4.3 2025 07:10 AM    TOTPROTEIN 7.0 2025 07:10 AM    GLOBULIN 2.7 2025 07:10 AM    AGRATIO 1.6 2025 07:10 AM       Lab Results   Component Value Date/Time    TSHULTRASEN 0.070 (L) 2021 0840     Lab Results   Component Value Date/Time    FREET4 1.73 (H) 2021 0840     Lab Results   Component Value Date/Time    FREET3 3.37 2021 0836     No results found for: THYSTIMIG      Imagin2021 12:42 PM     HISTORY/REASON FOR EXAM:        TECHNIQUE/EXAM DESCRIPTION:  Ultrasound of the soft tissues of the head and neck.     COMPARISON:  None     FINDINGS:  The thyroid gland is heterogeneous.  Vascularity is normal.     The right lobe of the thyroid gland measures 0.70 cm x 1.22 cm x 0.79 cm. The contour and echogenicity are normal. No focal mass lesions are identified.  The left lobe of the thyroid gland measures 0.62 cm x 0.75 cm x 0.46 cm. The contour and echogenicity are normal. No focal mass lesions are identified.     No discrete nodules are identified.     IMPRESSION:     No discrete nodules or masses identified.        ASSESSMENT/PLAN:     1. Postsurgical hypothyroidism  Controlled  Tsh is normal   TSH suppression is no longer needed.  Continue   levothyroxine to 75 MCG daily  Repeat TSH levels in 12 months    2. Papillary  carcinoma of thyroid (HCC)  Stable  Previous structural abnormality seen on ultrasound may represent residual thyroid tissue  It is notable that the patient's quantitative thyroglobulin levels are not rising indicating lack of evidence of thyroid cancer recurrence  Continue monitoring   Repeat quantitative thyroglobulin every 12 months  Repeat neck Us in 2025    3. Postsurgical hypoparathyroidism (HCC)  Controlled   Goal calcium is 8.0 to 8.5  Reduce  calcitriol 0.25 mcg twice a day  Continue calcium supplementation calcium citrate 600 mg twice a day of   Continue monitoring ca and phos levels     Recommend he see pcp for the left sided UE paresthesias as it is not from hypocalcemia -  the positional nature of the neuropathic symptoms suggest a radicular issue or mononeuritis     4. Vitamin D deficiency  Controlled  Continue current supplements      5. Paresthesia of left upper extremity  Recommend he see pcp for the left sided UE paresthesias as it is not from hypocalcemia -  the positional nature of the neuropathic symptoms suggest a radicular issue or mononeuritis       Return in about 8 months (around 2/28/2026).        Thank you kindly for allowing me to participate in the thyroid care plan for this patient.    Erlin Crane MD, FACE, N      CC:   Gurwinder Rosario III, M.D.

## 2025-07-27 DIAGNOSIS — I10 ESSENTIAL HYPERTENSION: ICD-10-CM

## 2025-07-27 RX ORDER — LOSARTAN POTASSIUM 100 MG/1
100 TABLET ORAL
Qty: 90 TABLET | Refills: 1 | Status: SHIPPED | OUTPATIENT
Start: 2025-07-27

## 2025-08-04 ENCOUNTER — APPOINTMENT (OUTPATIENT)
Dept: MEDICAL GROUP | Facility: PHYSICIAN GROUP | Age: 60
End: 2025-08-04
Payer: COMMERCIAL

## 2025-08-04 PROBLEM — M54.12 CERVICAL RADICULOPATHY: Status: ACTIVE | Noted: 2025-08-04

## 2025-08-04 ASSESSMENT — FIBROSIS 4 INDEX: FIB4 SCORE: 2.25

## 2025-08-12 ENCOUNTER — APPOINTMENT (OUTPATIENT)
Dept: MEDICAL GROUP | Facility: PHYSICIAN GROUP | Age: 60
End: 2025-08-12
Payer: COMMERCIAL